# Patient Record
Sex: FEMALE | Race: WHITE
[De-identification: names, ages, dates, MRNs, and addresses within clinical notes are randomized per-mention and may not be internally consistent; named-entity substitution may affect disease eponyms.]

---

## 2019-02-13 ENCOUNTER — HOSPITAL ENCOUNTER (EMERGENCY)
Dept: HOSPITAL 11 - JP.ED | Age: 64
Discharge: HOME | End: 2019-02-13
Payer: COMMERCIAL

## 2019-02-13 VITALS — SYSTOLIC BLOOD PRESSURE: 175 MMHG | DIASTOLIC BLOOD PRESSURE: 101 MMHG

## 2019-02-13 DIAGNOSIS — Z79.899: ICD-10-CM

## 2019-02-13 DIAGNOSIS — Z79.82: ICD-10-CM

## 2019-02-13 DIAGNOSIS — I25.10: ICD-10-CM

## 2019-02-13 DIAGNOSIS — J44.9: ICD-10-CM

## 2019-02-13 DIAGNOSIS — Z91.048: ICD-10-CM

## 2019-02-13 DIAGNOSIS — I10: ICD-10-CM

## 2019-02-13 DIAGNOSIS — J01.21: Primary | ICD-10-CM

## 2019-02-13 NOTE — EDM.PDOC
ED HPI GENERAL MEDICAL PROBLEM





- General


Chief Complaint: ENT Problem


Stated Complaint: HEAD PAIN, PRESSURE


Time Seen by Provider: 02/13/19 01:50


Source of Information: Reports: Patient


History Limitations: Reports: No Limitations





- History of Present Illness


INITIAL COMMENTS - FREE TEXT/NARRATIVE: 





63-year-old female who was had an upper respiratory cold for the last several 

days, has developed intense pressure and pain to the ethmoid sinuses. She has a 

history of recurring sinus infections. She has no fever or chills, no 

significant cough. Pain radiates into the ears.


Onset: Gradual


Duration: Day(s): (3 days)


Associated Symptoms: Reports: Headaches, Malaise.  Denies: Cough, Fever/Chills, 

Shortness of Breath





- Related Data


 Allergies











Allergy/AdvReac Type Severity Reaction Status Date / Time


 


nickel Allergy  Rash Verified 12/04/18 17:13











Home Meds: 


 Home Meds





Aspirin [Adult Low Dose Aspirin EC] 81 mg PO DAILY 09/10/13 [History]


Ca Carbonate/Vitamin D3/Vit K [Calcium + D Soft Chewable Tab] 1 each PO DAILY 09

/10/13 [History]


Multivitamin [Multi-Vitamin Daily] 1 tab PO DAILY 09/10/13 [History]


Metoprolol Succinate 25 mg PO BID 09/11/13 [History]


Albuterol/Ipratropium [Combivent] 1 puff INH QID 09/01/17 [History]


Aspirin [Halfprin] 1 tab PO DAILY 12/04/18 [History]


Aspirin/Caffeine [Percy Back-Body 500-32.5 mg] 2 tab PO BID PRN 12/04/18 [

History]











Past Medical History


HEENT History: Reports: Hard of Hearing, Impaired Vision


Cardiovascular History: Reports: CAD, Hypertension


Other Cardiovascular History: ablasion 2013


Respiratory History: Reports: COPD, SOB


Gastrointestinal History: Reports: Other (See Below)


Other Gastrointestinal History: colitis


OB/GYN History: Reports: Pregnancy


Musculoskeletal History: Reports: Back Pain, Chronic


Other Musculoskeletal History: LAMINECTOMY 2017 AUGUST


Other Neuro History: AVM [id number]





- Infectious Disease History


Infectious Disease History: Reports: C-Difficile, Chicken Pox, Measles





- Past Surgical History


HEENT Surgical History: Reports: Tonsillectomy, Other (See Below)


Other HEENT Surgeries/Procedures: ear surg


Cardiovascular Surgical History: Reports: None


Respiratory Surgical History: Reports: None


GI Surgical History: Reports: Colonoscopy


Neurological Surgical History: Reports: Laminectomy


Musculoskeletal Surgical History: Reports: None





Social & Family History





- Family History


Family Medical History: Noncontributory





- Tobacco Use


Smoking Status *Q: Never Smoker





- Caffeine Use


Caffeine Use: Reports: Coffee





- Recreational Drug Use


Recreational Drug Use: No





- Living Situation & Occupation


Living situation: Reports: 


Occupation: Retired (lives with , retired, in [place], [place]. has three 

grown children)





ED ROS ENT





- Review of Systems


Review Of Systems: See Below


Constitutional: Denies: Fever, Chills


HEENT: Reports: Ear Pain, Sinus Problem.  Denies: Throat Pain


Respiratory: Denies: Shortness of Breath, Cough


Cardiovascular: Denies: Chest Pain


GI/Abdominal: Reports: Nausea.  Denies: Abdominal Pain, Vomiting


Skin: Reports: No Symptoms


Neurological: Reports: Headache





ED EXAM, ENT





- Physical Exam


Exam: See Below


Exam Limited By: No Limitations


General Appearance: Alert, No Apparent Distress (Looks uncomfortable but not 

distressed)


Ears: Normal TMs


Nose: Other (Nasal passages are completely obstructed with exudate)


Mouth/Throat: Normal Inspection


Head: Sinus Tenderness (Significant sinus percussion tenderness, especially on 

the right)


Respiratory/Chest: No Respiratory Distress, Lungs Clear





Course





- Vital Signs


Last Recorded V/S: 


 Last Vital Signs











Temp  97.2 F   02/13/19 01:57


 


Pulse  105 H  02/13/19 01:57


 


Resp  18   02/13/19 01:57


 


BP  175/101 H  02/13/19 01:57


 


Pulse Ox  94 L  02/13/19 01:57














- Re-Assessments/Exams


Free Text/Narrative Re-Assessment/Exam: 





02/13/19 02:15


Patient will be placed on amoxicillin 500 mg 3 times a day for 10 full days, 

and encouraged to use some Afrin or Niall-Synephrine to open his airways 

temporarily. She can continue with decongestants. Recheck in 2-3 days if not 

improving satisfactorily.





Departure





- Departure


Time of Disposition: 02:25


Disposition: Home, Self-Care 01


Condition: Good


Clinical Impression: 


Sinusitis


Qualifiers:


 Sinusitis location: ethmoidal Chronicity: acute Recurrence: recurrent 

Qualified Code(s): J01.21 - Acute recurrent ethmoidal sinusitis








- Discharge Information


Instructions:  Sinusitis, Adult, Easy-to-Read


Referrals: 


aCmeron Gallegos MD [Primary Care Provider] - 


Forms:  ED Department Discharge


Care Plan Goals: 


Take antibiotic 3 times a day until gone. Consider rechecking in 2-3 days if 

not improving satisfactorily. Continue with decongestants, and consider nasal 

spray such as Afrin or Niall-Synephrine to open the airways temporarily.

## 2019-08-26 NOTE — CR
Pelvis 1V or 2V

 

CLINICAL HISTORY: Left hip arthroplasty

 

FINDINGS: Patient is status post total left hip arthroplasty. Components appear

well seated. The upper acetabular groove extends just beyond the ilial cortex.

There is moderate degenerative change in the right hip

 

IMPRESSION: Status post total left hip arthroplasty

 

Moderate to osteoarthritis in the right hip

## 2019-08-26 NOTE — OR
DATE OF PROCEDURE:  08/26/2019

 

PREOPERATIVE DIAGNOSIS:  Osteoarthritis, left hip.

 

POSTOPERATIVE DIAGNOSIS:  Osteoarthritis, left hip.

 

PROCEDURE:  Left total hip arthroplasty using Da Trabecular Metal cup size 
52, M/L Taper

stem size 12.5, 0 neck length 36 mm ceramic head.

 

ANESTHESIA:  Spinal with sedation.

 

INDICATIONS:  Karla is a 64-year-old female with a history of progressive left 
hip pain for

the past several years.  She has been experiencing increasing pain and limited 
range of

motion, particularly over the past 6 months.  X-rays reveal end-stage 
osteoarthritis with

complete joint space collapse, deformity of the femoral head, and significant 
peripheral

osteophytes.  The patient now presents for left total hip arthroplasty.  She 
does have a

significant congenital limb length discrepancy.  This will not be able to be 
corrected

significantly with the total hip procedure.

 

DESCRIPTION OF PROCEDURE:  After adequate anesthesia was obtained, the patient 
was placed in

a lateral decubitus position and secured with a hip positioner.  The left hip 
and leg were

then prepped and draped in a sterile fashion.  A longitudinal incision was made 
over the

greater trochanter and carried down to the subcutaneous tissues.  Hemostasis 
was obtained

with electrocautery.  The IT band was then split in line with its fibers and a 
Charnley

retractor was placed.  Minimal internal rotation of the hip was present.  The 
short

external rotators were taken off the greater trochanter.  Again, hemostasis was 
obtained

with electrocautery.  Capsule was then entered in a T-fashion, allowing the hip 
to be

dislocated.  Significant deformity and wear of the femoral head were noted.  
Retractor was

placed about the neck of the femur, and the femoral neck was then resected with 
an

oscillating saw.  Retractors were replaced with the anterior and inferior 
acetabulum.  Soft

tissues were cleared from the central portion of the acetabulum and any 
remaining labrum was

excised sharply.  Acetabulum was then sequentially reamed to 52 mm.  This was 
irrigated, and

a 52 mm Trabecular Metal cup was press-fit into position.  Additional fixation 
was obtained

with a single acetabular screw.  The polyethylene liner for a 36 mm head was 
then tapped

into position.  Marginal osteophytes were removed with an osteotome.

 

Attention was then returned to the femur.  A box osteotome was used to remove 
the lateral

femoral neck cortex.  Metaphyseal reamer used to lateralize the canal.  The 
canal was then

sequentially broached to 12.5 M/L taper stem.  The stem was tapped into 
position with

excellent fit and fill.  A 0 neck length femoral head trial was placed.  This 
allowed full

extension, although did exhibit some tightness in the capsule.  The hip could 
be flexed to

90 degrees, adduct to 20 degrees and internally rotated slightly beyond 40 
degrees before

any motion of the head was noted.  Trial was removed.  The Sosa taper was 
cleaned and a

final ceramic head was tapped into position.  Hip was reduced once again.  Hip 
was irrigated

with pulse lavage.  It was then irrigated with a dilute Betadine solution which 
was allowed

to sit for approximately 2 minutes and then irrigated once again.

 

IT band was closed with number 2 Ethibond in a running locking fashion.  Skin 
was closed

with 2-0 Vicryl and a running 3-0 Monocryl.  Steri-Strips were applied.  
Sterile dressing

was then placed.  The patient tolerated the procedure very well.  There were no

complications.  She was taken from the operating room in a stable condition.

 

 

 

 

Jorge Cruz MD

DD:  08/26/2019 16:41:28

DT:  08/26/2019 23:00:10

Job #:  [id number]

[email or web address]

## 2019-08-28 NOTE — PCM.DCSUM1
**Discharge Summary





- Hospital Course


Free Text/Narrative:: 





64 year old with severe left hip OA admitted for left ANA.


Diagnosis: Stroke: No





- Discharge Data


Discharge Date: 08/28/19


Discharge Disposition: Home, W Home Health Agency 06


Condition: Good





- Discharge Diagnosis/Problem(s)


(1) Status post total hip replacement, left


SNOMED Code(s): 296065590911, 529391061114


   ICD Code: Z96.642 - PRESENCE OF LEFT ARTIFICIAL HIP JOINT   Status: Acute   

Current Visit: Yes   





(2) Osteoarthritis, hip, bilateral


SNOMED Code(s): 447179521940282


   ICD Code: M16.0 - BILATERAL PRIMARY OSTEOARTHRITIS OF HIP   Status: Chronic 

  Current Visit: No   


Qualifiers: 


   Osteoarthritis type: primary   Qualified Code(s): M16.0 - Bilateral primary 

osteoarthritis of hip   





- Patient Summary/Data


Operative Procedure(s) Performed: Left ANA


Complications: None


Consults: 


 Consultations





08/26/19 09:18


Consult to Case Management/ [CONS] Routine 


   Comment: 


   Physician Instructions: 


   Service(s) to be Consulted: Case Management


   Reason for Consult: Plan for Discharge


PT Evaluation and Treatment [CONS] Routine 


   Please Evaluate and Treat.


   PT Reason for Consult: Post op Ortho Surgery


   Pending Discharge: Yes


   Discharge Disposition: Home w Home Health


   This query below is only for informational purposes and is not editable.





08/28/19 08:27


Consult to Occupational Therapy [OT Evaluation and Treatment] [CONS] Routine 


   Please Evaluate and Treat.


   OT Reason for Consult: ADL's


   Pending Discharge: Yes


   Discharge Disposition: Home w Home Health


   Special Instructions: ADLs and adaptive devices


   This query below is only for informational purposes and is not editable.


   Admission Diagnosis/Problem: Osteoarthritis











Hospital Course: 





Patient admitted and underwent left ANA without complication.  Tolerated 

procedure well and did very well post-operatively.  Pain was controlled, had 

some mild nausea POD #1.  Merino removed POD #1 and had no difficulty with 

voiding.  Did well with PT and is able to get out of bed independently.   

Dressing removed POD # 2 and incision is clean and dry with no drainage.  

Arrangements made for Home Health to follow.  





- Patient Instructions


Diet: Usual Diet as Tolerated


Activity: Full Weight Bearing


Activity, Other: total hip precautions


Driving: Do Not Drive


Showering/Bathing: May Shower


Wound/Incision Care: Keep Operative Site/Wound Site Clean and Dry


Notify Provider of: Fever, Increased Pain, Swelling and Redness, Drainage, 

Nausea and/or Vomiting





- Discharge Plan


*PRESCRIPTION DRUG MONITORING PROGRAM REVIEWED*: No


*COPY OF PRESCRIPTION DRUG MONITORING REPORT IN PATIENT CIRILO: No


Prescriptions/Med Rec: 


oxyCODONE HCl/Acetaminophen [Percocet 5-325 mg Tablet] 1 each PO Q4HR PRN #36 

tablet


 PRN Reason: Pain


Enoxaparin Sodium [Lovenox] 30 mg SQ DAILY 24 Days #24 ml


Home Medications: 


 Home Meds





Aspirin [Adult Low Dose Aspirin EC] 81 mg PO DAILY 09/10/13 [History]


Ca Carbonate/Vitamin D3/Vit K [Calcium + D Soft Chewable Tab] 1 each PO DAILY 09

/10/13 [History]


Multivitamin [Multi-Vitamin Daily] 1 tab PO MOWEFR@0900 09/10/13 [History]


Metoprolol Succinate 25 mg PO BID 09/11/13 [History]


Albuterol/Ipratropium [Combivent] 1 puff INH QID 09/01/17 [History]


Aspirin/Caffeine [Percy Back-Body 500-32.5 mg] 2 tab PO BID PRN 12/04/18 [

History]


Albuterol [Proventil Neb Soln] 2.5 mg INH Q4H PRN 08/26/19 [History]


Fluticasone/Vilanterol [Breo Ellipta 100-25 MCG Inhalation Kit] 1 each IH DAILY 

08/26/19 [History]


Enoxaparin Sodium [Lovenox] 30 mg SQ DAILY 24 Days #24 ml 08/28/19 [Rx]


oxyCODONE HCl/Acetaminophen [Percocet 5-325 mg Tablet] 1 each PO Q4HR PRN #36 

tablet 08/28/19 [Rx]








Other Amb Orders: 


PT Evaluation and Treatment [CONS] Time Frame: 3 Days, Location: None Selected


Oxygen Therapy Mode: Room Air


Referrals: 


Jorge Cruz MD [Physician] - 09/10/19 10:45 am (Please arrive 15 minutes 

early to register for your appointment.  Please register at the ER desk.)





- Discharge Summary/Plan Comment


DC Time >30 min.: Yes (36 min)





- General Info


Functional Status: Reports: Pain Controlled, Tolerating Diet, Ambulating, 

Urinating





- Review of Systems


General: Reports: No Symptoms


Pulmonary: Reports: No Symptoms


Cardiovascular: Reports: No Symptoms


Gastrointestinal: Reports: No Symptoms


Genitourinary: Reports: No Symptoms


Musculoskeletal: Reports: Leg Pain


Skin: Reports: No Symptoms


Neurological: Reports: No Symptoms


Psychiatric: Reports: No Symptoms





- Patient Data


Vitals - Most Recent: 


 Last Vital Signs











Temp  35.6 C   08/28/19 11:54


 


Pulse  96   08/28/19 11:54


 


Resp  16   08/28/19 11:54


 


BP  111/69   08/28/19 11:54


 


Pulse Ox  96   08/28/19 11:54











Weight - Most Recent: 71.894 kg


I&O - Last 24 hours: 


 Intake & Output











 08/27/19 08/28/19 08/28/19





 22:59 06:59 14:59


 


Intake Total 100  


 


Output Total 


 


Balance -250 -650 -1000











Med Orders - Current: 


 Current Medications





Hydrocodone Bitart/Acetaminophen (Norco 325-5 Mg)  1 tab PO Q3H PRN


   PRN Reason: Pain (mild 1-3)


   Last Admin: 08/26/19 10:42 Dose:  1 tab


Albuterol (Proventil Neb Soln)  2.5 mg INH Q4H PRN


   PRN Reason: Shortness of Breath


Albuterol/Ipratropium (Duoneb 3.0-0.5 Mg/3 Ml)  3 ml INH QIDRT [facility]


   Last Admin: 08/28/19 10:57 Dose:  3 ml


Bandage/Support Products ( Nasal )  1 applic NASBOTH BID [facility]


   Stop: 09/01/19 21:01


   Last Admin: 08/28/19 09:49 Dose:  1 applic


Docusate Sodium (Colace)  100 mg PO BID PRN


   PRN Reason: Constipation


Enoxaparin Sodium (Lovenox)  30 mg SUBCUT DAILY [facility]


   Last Admin: 08/28/19 09:49 Dose:  30 mg


Magnesium Hydroxide (Milk Of Magnesia)  30 ml PO BID PRN


   PRN Reason: Constipation


Metoprolol Succinate (Toprol Xl)  25 mg PO BID [facility]


   Last Admin: 08/28/19 09:50 Dose:  25 mg


Morphine Sulfate (Morphine)  2 mg IVPUSH Q1H PRN


   PRN Reason: Breakthrough Pain


Ondansetron HCl (Zofran)  4 mg IVPUSH Q6H PRN


   PRN Reason: Nausea/Vomiting


   Last Admin: 08/28/19 07:27 Dose:  4 mg


Oxycodone/Acetaminophen (Percocet 325-5 Mg)  2 tab PO Q4H PRN


   PRN Reason: Pain


   Last Admin: 08/28/19 10:05 Dose:  2 tab


Fluticasone/Salmeterol (Fluticasone-Salmeterol 113-14 Mcg Powder Inh)  1 puff 

INH BIDRT [facility]


   Last Admin: 08/28/19 07:30 Dose:  1 puff





Discontinued Medications





Enoxaparin Sodium (Lovenox)  30 mg SUBCUT DAILY [facility]


Fentanyl (Sublimaze) Confirm Administered Dose 100 mcg .ROUTE .STK-MED ONE


   Stop: 08/26/19 07:27


Cefazolin Sodium/Dextrose 2 gm (/ Premix)  50 mls @ 100 mls/hr IV ONETIME ONE


   Stop: 08/26/19 07:59


   Last Admin: 08/26/19 07:41 Dose:  100 mls/hr


Lactated Ringer's (Ringers, Lactated)  1,000 mls @ 75 mls/hr IV ASDIRECTED [facility]


   Last Admin: 08/26/19 06:25 Dose:  75 mls/hr


Lactated Ringer's (Ringers, Lactated) Confirm Administered Dose 1,000 mls @ as 

directed .ROUTE .STK-MED ONE


   Stop: 08/26/19 08:23


Cefazolin Sodium 1 gm/ Sodium (Chloride)  50 mls @ 200 mls/hr IV Q8H [facility]


   Stop: 08/27/19 01:44


   Last Admin: 08/26/19 11:34 Dose:  Not Given


Sodium Chloride (Normal Saline)  1,000 mls @ 125 mls/hr IV ASDIRECTED [facility]


Sodium Chloride (Normal Saline)  1,000 mls @ 125 mls/hr IV ASDIRECTED [facility]


   Last Admin: 08/26/19 23:39 Dose:  125 mls/hr


Cefazolin Sodium/Dextrose 1 gm (/ Premix)  50 mls @ 100 mls/hr IV Q8H [facility]


   Stop: 08/27/19 06:59


   Last Admin: 08/27/19 05:30 Dose:  100 mls/hr


Metoprolol Succinate (Toprol Xl)  25 mg PO BID [facility]


Midazolam HCl (Versed 1 Mg/Ml) Confirm Administered Dose 2 mg .ROUTE .STK-MED 

ONE


   Stop: 08/26/19 07:27


Midazolam HCl (Versed 1 Mg/Ml) Confirm Administered Dose 2 mg .ROUTE .STK-MED 

ONE


   Stop: 08/26/19 08:21


Non-Formulary Medication (Albuterol/Ipratropium [Combivent])  1 puff INH QID [facility]


   Last Admin: 08/26/19 10:56 Dose:  Not Given


Non-Formulary Medication (Fluticasone/Vilanterol)  1 each IH DAILY [facility]


Povidone Iodine (Betadine 10% Soln) Confirm Administered Dose 1 ml .ROUTE .STK-

MED ONE


   Stop: 08/26/19 06:38


   Last Admin: 08/26/19 10:03 Dose:  1 ml


Propofol (Diprivan  20 Ml) Confirm Administered Dose 200 mg .ROUTE .STK-MED ONE


   Stop: 08/26/19 07:26











- Exam


General: Reports: Alert, Oriented


HEENT: Reports: Pupils Equal, Pupils Reactive, EOMI, Mucous Membr. Moist/Pink


Neck: Reports: Supple


Lungs: Reports: Clear to Auscultation, Normal Respiratory Effort


Cardiovascular: Reports: Regular Rate, Regular Rhythm


GI/Abdominal Exam: Normal Bowel Sounds, Soft, Non-Tender, No Organomegaly, No 

Distention, No Abnormal Bruit, No Mass, Pelvis Stable


 (Female) Exam: Deferred


Rectal (Female) Exam: Deferred


Back Exam: Reports: Normal Inspection, Full Range of Motion


Extremities: Other (Incision dry, moderate bruising and swelling )


Skin: Reports: Warm, Dry


Wound/Incisions: Reports: Healing Well, Dressing Dry and Intact, No Drainage


Neurological: Reports: No New Focal Deficit


Psy/Mental Status: Reports: Alert, Normal Affect, Normal Mood

## 2019-08-28 NOTE — PCM.SURGPN
- General Info


Date of Service: 08/27/19


Date of Surgery/Procedure: 08/26/19


POD#: 1


Post-Op Diagnosis: S/P left ANA


Functional Status: Reports: Pain Controlled, Tolerating Diet, Ambulating





- Review of Systems


General: Reports: No Symptoms


HEENT: Reports: No Symptoms


Pulmonary: Reports: No Symptoms


Cardiovascular: Reports: No Symptoms


Gastrointestinal: Reports: No Symptoms


Genitourinary: Reports: No Symptoms


Skin: Reports: No Symptoms


Neurological: Reports: No Symptoms


Psychiatric: Reports: No Symptoms





- Patient Data


Vitals - Most Recent: 


 Last Vital Signs











Temp  35.6 C   08/28/19 11:54


 


Pulse  96   08/28/19 11:54


 


Resp  16   08/28/19 11:54


 


BP  111/69   08/28/19 11:54


 


Pulse Ox  96   08/28/19 11:54











Weight - Most Recent: 71.894 kg


I&O - Last 24 Hours: 


 Intake & Output











 08/27/19 08/28/19 08/28/19





 22:59 06:59 14:59


 


Intake Total 100  


 


Output Total 


 


Balance -250 -650 -1000











Med Orders - Current: 


 Current Medications





Hydrocodone Bitart/Acetaminophen (Norco 325-5 Mg)  1 tab PO Q3H PRN


   PRN Reason: Pain (mild 1-3)


   Last Admin: 08/26/19 10:42 Dose:  1 tab


Albuterol (Proventil Neb Soln)  2.5 mg INH Q4H PRN


   PRN Reason: Shortness of Breath


Albuterol/Ipratropium (Duoneb 3.0-0.5 Mg/3 Ml)  3 ml INH QIDRT [facility]


   Last Admin: 08/28/19 10:57 Dose:  3 ml


Bandage/Support Products ( Nasal )  1 applic NASBOTH BID [facility]


   Stop: 09/01/19 21:01


   Last Admin: 08/28/19 09:49 Dose:  1 applic


Docusate Sodium (Colace)  100 mg PO BID PRN


   PRN Reason: Constipation


Enoxaparin Sodium (Lovenox)  30 mg SUBCUT DAILY [facility]


   Last Admin: 08/28/19 09:49 Dose:  30 mg


Magnesium Hydroxide (Milk Of Magnesia)  30 ml PO BID PRN


   PRN Reason: Constipation


Metoprolol Succinate (Toprol Xl)  25 mg PO BID [facility]


   Last Admin: 08/28/19 09:50 Dose:  25 mg


Morphine Sulfate (Morphine)  2 mg IVPUSH Q1H PRN


   PRN Reason: Breakthrough Pain


Ondansetron HCl (Zofran)  4 mg IVPUSH Q6H PRN


   PRN Reason: Nausea/Vomiting


   Last Admin: 08/28/19 07:27 Dose:  4 mg


Oxycodone/Acetaminophen (Percocet 325-5 Mg)  2 tab PO Q4H PRN


   PRN Reason: Pain


   Last Admin: 08/28/19 10:05 Dose:  2 tab


Fluticasone/Salmeterol (Fluticasone-Salmeterol 113-14 Mcg Powder Inh)  1 puff 

INH BIDRT [facility]


   Last Admin: 08/28/19 07:30 Dose:  1 puff





Discontinued Medications





Enoxaparin Sodium (Lovenox)  30 mg SUBCUT DAILY [facility]


Fentanyl (Sublimaze) Confirm Administered Dose 100 mcg .ROUTE .STK-MED ONE


   Stop: 08/26/19 07:27


Cefazolin Sodium/Dextrose 2 gm (/ Premix)  50 mls @ 100 mls/hr IV ONETIME ONE


   Stop: 08/26/19 07:59


   Last Admin: 08/26/19 07:41 Dose:  100 mls/hr


Lactated Ringer's (Ringers, Lactated)  1,000 mls @ 75 mls/hr IV ASDIRECTED [facility]


   Last Admin: 08/26/19 06:25 Dose:  75 mls/hr


Lactated Ringer's (Ringers, Lactated) Confirm Administered Dose 1,000 mls @ as 

directed .ROUTE .STK-MED ONE


   Stop: 08/26/19 08:23


Cefazolin Sodium 1 gm/ Sodium (Chloride)  50 mls @ 200 mls/hr IV Q8H [facility]


   Stop: 08/27/19 01:44


   Last Admin: 08/26/19 11:34 Dose:  Not Given


Sodium Chloride (Normal Saline)  1,000 mls @ 125 mls/hr IV ASDIRECTED [facility]


Sodium Chloride (Normal Saline)  1,000 mls @ 125 mls/hr IV ASDIRECTED [facility]


   Last Admin: 08/26/19 23:39 Dose:  125 mls/hr


Cefazolin Sodium/Dextrose 1 gm (/ Premix)  50 mls @ 100 mls/hr IV Q8H [facility]


   Stop: 08/27/19 06:59


   Last Admin: 08/27/19 05:30 Dose:  100 mls/hr


Metoprolol Succinate (Toprol Xl)  25 mg PO BID [facility]


Midazolam HCl (Versed 1 Mg/Ml) Confirm Administered Dose 2 mg .ROUTE .STK-MED 

ONE


   Stop: 08/26/19 07:27


Midazolam HCl (Versed 1 Mg/Ml) Confirm Administered Dose 2 mg .ROUTE .STK-MED 

ONE


   Stop: 08/26/19 08:21


Non-Formulary Medication (Albuterol/Ipratropium [Combivent])  1 puff INH QID [facility]


   Last Admin: 08/26/19 10:56 Dose:  Not Given


Non-Formulary Medication (Fluticasone/Vilanterol)  1 each IH DAILY [facility]


Povidone Iodine (Betadine 10% Soln) Confirm Administered Dose 1 ml .ROUTE .STK-

MED ONE


   Stop: 08/26/19 06:38


   Last Admin: 08/26/19 10:03 Dose:  1 ml


Propofol (Diprivan  20 Ml) Confirm Administered Dose 200 mg .ROUTE .STK-MED ONE


   Stop: 08/26/19 07:26











- Exam


Wound/Incisions: Dressing Dry and Intact


General: Alert, Oriented


HEENT: Pupils Equal


Neck: Supple


Lungs: Clear to Auscultation, Normal Respiratory Effort


Cardiovascular: Regular Rate, Regular Rhythm


GI/Abdominal Exam: Normal Bowel Sounds, Soft, Non-Tender, No Organomegaly, No 

Distention, No Abnormal Bruit, No Mass, Pelvis Stable


Extremities: Leg Pain


Skin: Warm, Dry


Neurological: No New Focal Deficit


Psy/Mental Status: Alert, Normal Affect, Normal Mood





- Problem List & Annotations


(1) Status post total hip replacement, left


SNOMED Code(s): 390115812340, 979588034002


   Code(s): Z96.642 - PRESENCE OF LEFT ARTIFICIAL HIP JOINT   Status: Acute   

Current Visit: Yes   





(2) Osteoarthritis, hip, bilateral


SNOMED Code(s): 790562883645992


   Code(s): M16.0 - BILATERAL PRIMARY OSTEOARTHRITIS OF HIP   Status: Chronic   

Current Visit: No   


Qualifiers: 


   Osteoarthritis type: primary   Qualified Code(s): M16.0 - Bilateral primary 

osteoarthritis of hip   





- Problem List Review


Problem List Initiated/Reviewed/Updated: Yes





- My Orders


Last 24 Hours: 


 Active Orders 24 hr











 Category Date Time Status


 


 Ready for Discharge [RC] PER UNIT ROUTINE Care  08/28/19 13:09 Ordered


 


 Consult to Occupational Therapy [OT Evaluation and Cons  08/28/19 08:27 Active





 Treatment] [CONS] Routine   








 Medication Orders





Hydrocodone Bitart/Acetaminophen (Norco 325-5 Mg)  1 tab PO Q3H PRN


   PRN Reason: Pain (mild 1-3)


   Last Admin: 08/26/19 10:42  Dose: 1 tab


Albuterol (Proventil Neb Soln)  2.5 mg INH Q4H PRN


   PRN Reason: Shortness of Breath


Albuterol/Ipratropium (Duoneb 3.0-0.5 Mg/3 Ml)  3 ml INH QIDRT [facility]


   Last Admin: 08/28/19 10:57  Dose: 3 ml


   Admin: 08/28/19 07:24  Dose: 3 ml


   Admin: 08/27/19 20:16  Dose: 3 ml


   Admin: 08/27/19 14:47  Dose: 3 ml


   Admin: 08/27/19 11:05  Dose: 3 ml


   Admin: 08/27/19 07:28  Dose: 3 ml


   Admin: 08/26/19 20:02  Dose: 3 ml


   Admin: 08/26/19 15:01  Dose: 3 ml


Bandage/Support Products ( Nasal )  1 applic NASBOTH BID [facility]


   Stop: 09/01/19 21:01


   Last Admin: 08/28/19 09:49  Dose: 1 applic


   Admin: 08/27/19 20:15  Dose: 1 applic


   Admin: 08/27/19 08:10  Dose: 1 applic


   Admin: 08/26/19 20:02  Dose: 1 applic


   Admin: 08/26/19 06:26  Dose: 1 applic


Docusate Sodium (Colace)  100 mg PO BID PRN


   PRN Reason: Constipation


Enoxaparin Sodium (Lovenox)  30 mg SUBCUT DAILY [facility]


   Last Admin: 08/28/19 09:49  Dose: 30 mg


   Admin: 08/27/19 08:09  Dose: 30 mg


Magnesium Hydroxide (Milk Of Magnesia)  30 ml PO BID PRN


   PRN Reason: Constipation


Metoprolol Succinate (Toprol Xl)  25 mg PO BID [facility]


   Last Admin: 08/28/19 09:50  Dose: 25 mg


   Admin: 08/27/19 20:17  Dose: 25 mg


   Admin: 08/27/19 08:10  Dose: 25 mg


   Admin: 08/26/19 20:04  Dose: 25 mg


Morphine Sulfate (Morphine)  2 mg IVPUSH Q1H PRN


   PRN Reason: Breakthrough Pain


Ondansetron HCl (Zofran)  4 mg IVPUSH Q6H PRN


   PRN Reason: Nausea/Vomiting


   Last Admin: 08/28/19 07:27  Dose: 4 mg


   Admin: 08/28/19 00:05  Dose: 4 mg


Oxycodone/Acetaminophen (Percocet 325-5 Mg)  2 tab PO Q4H PRN


   PRN Reason: Pain


   Last Admin: 08/28/19 10:05  Dose: 2 tab


   Admin: 08/28/19 02:40  Dose: 2 tab


   Admin: 08/27/19 19:34  Dose: 2 tab


   Admin: 08/27/19 14:31  Dose: 2 tab


   Admin: 08/27/19 09:39  Dose: 2 tab


   Admin: 08/27/19 03:04  Dose: 2 tab


   Admin: 08/26/19 22:31  Dose: 2 tab


   Admin: 08/26/19 18:13  Dose: 2 tab


   Admin: 08/26/19 13:13  Dose: 2 tab


Fluticasone/Salmeterol (Fluticasone-Salmeterol 113-14 Mcg Powder Inh)  1 puff 

INH BIDRT [facility]


   Last Admin: 08/28/19 07:30  Dose: 1 puff


   Admin: 08/27/19 20:16  Dose: 1 puff


   Admin: 08/27/19 08:10  Dose: 1 puff











- Assessment


Assessment           (Free Text/Narrative):: 





Did very well overnight, has been up with walker already, no complaints





- Plan


Plan                        (Free Text/Narrative):: 





D/C [facility], saline lock IV, up with PT/OT, start arrangements for discharge with 

Home Health to follow.

## 2019-09-12 NOTE — CRLCT
INDICATION:



Shortness of breath



COMPARISON:



Single view chest 09/12/2019



TECHNIQUE:



Contrast enhanced axial CT imaging through the chest, optimized for 

assessment of the pulmonary arterial tree. 100 cc Isovue 370 contrast agent 

was administered intravenously. Sagittal and coronal reconstructions are 

provided. 



FINDINGS:



There is adequate opacification of the pulmonary arterial tree, without 

evidence of thromboembolism.There is normal caliber of the main pulmonary 

artery.



The heart is nonenlarged. There is no pericardial effusion. Coronary artery 

disease is noted. There is normal caliber of the thoracic aorta. There is 

no mediastinal lymphadenopathy.



Pulmonary emphysema is noted. There is mild bibasilar scarring. There is no 

pleural effusion or pneumothorax.



Multilevel degenerative changes are demonstrated in the thoracic spine. 

There is compression deformity of the T7 vertebral body with up to 50% 

height loss.



No significant abnormality is demonstrated in the visualized upper abdomen.



IMPRESSION:



1. No evidence of pulmonary thromboembolism.



2. Pulmonary emphysema with mild bibasilar scarring. 



3. Coronary artery disease. 



4. Moderate compression deformity of the T7 vertebral body.



Please note that all CT scans at this facility use dose modulation, 

iterative reconstruction, and/or weight-based dosing when appropriate to 

reduce radiation dose to as low as reasonably achievable.



Dictated by Esteban Sparks MD @ Sep 12 2019  9:20PM



Signed by Dr. Esteban Sparks @ Sep 12 2019  9:20PM

## 2019-09-12 NOTE — EDM.PDOC
ED HPI GENERAL MEDICAL PROBLEM





- General


Chief Complaint: Respiratory Problem


Stated Complaint: SOB


Time Seen by Provider: 09/12/19 19:52


Source of Information: Reports: Patient


History Limitations: Reports: No Limitations





- History of Present Illness


INITIAL COMMENTS - FREE TEXT/NARRATIVE: 





pt had an episode that came on very suddenly during the nite where she was sob. 

She has a history of copd but she usually gets sob when she is active. 


Onset: Sudden, Other ( started in the middle of the nite. )


Duration: Hour(s):


Location: Reports: Chest


Associated Symptoms: Reports: Chest Pain, Cough, Other ( chest feels tight and 

strange to her.  Pt is on lovenox. )





- Related Data


 Allergies











Allergy/AdvReac Type Severity Reaction Status Date / Time


 


nickel Allergy  Rash Verified 09/01/19 22:26











Home Meds: 


 Home Meds





Aspirin [Adult Low Dose Aspirin EC] 81 mg PO DAILY 09/10/13 [History]


Ca Carbonate/Vitamin D3/Vit K [Calcium + D Soft Chewable Tab] 1 each PO DAILY 09

/10/13 [History]


Multivitamin [Multi-Vitamin Daily] 1 tab PO MOWEFR@0900 09/10/13 [History]


Metoprolol Succinate 25 mg PO BID 09/11/13 [History]


Albuterol/Ipratropium [Combivent] 1 puff INH QID 09/01/17 [History]


Aspirin/Caffeine [Percy Back-Body 500-32.5 mg] 2 tab PO BID PRN 12/04/18 [

History]


Albuterol [Proventil Neb Soln] 2.5 mg INH Q4H PRN 08/26/19 [History]


Fluticasone/Vilanterol [Breo Ellipta 100-25 MCG Inhalation Kit] 1 each IH DAILY 

08/26/19 [History]


Enoxaparin Sodium [Lovenox] 30 mg SQ DAILY 24 Days #24 ml 08/28/19 [Rx]


Ondansetron HCl [Zofran] 4 mg PO Q6HR PRN #12 tablet 08/28/19 [Rx]


Furosemide 20 mg PO DAILY 09/11/19 [History]











Past Medical History


HEENT History: Reports: Hard of Hearing, Impaired Vision


Cardiovascular History: Reports: CAD, Hypertension


Other Cardiovascular History: ablasion 2013


Respiratory History: Reports: COPD, SOB


Gastrointestinal History: Reports: Other (See Below)


Other Gastrointestinal History: colitis


Genitourinary History: Reports: None


OB/GYN History: Reports: Pregnancy


Musculoskeletal History: Reports: Back Pain, Chronic


Other Musculoskeletal History: LAMINECTOMY 2017 AUGUST.  L lower back pain 

radiating to L hip down to L knee


Other Neuro History: AVM [id number]


Psychiatric History: Reports: None


Endocrine/Metabolic History: Reports: None


Hematologic History: Reports: None


Immunologic History: Reports: None


Oncologic (Cancer) History: Reports: None


Dermatologic History: Reports: None





- Infectious Disease History


Infectious Disease History: Reports: Chicken Pox, Measles





- Past Surgical History


HEENT Surgical History: Reports: Tonsillectomy, Other (See Below)


Other HEENT Surgeries/Procedures: ear surg


Cardiovascular Surgical History: Reports: None


Respiratory Surgical History: Reports: None


GI Surgical History: Reports: Colonoscopy


Neurological Surgical History: Reports: Laminectomy


Musculoskeletal Surgical History: Reports: None, Hip Replacement





Social & Family History





- Family History


Family Medical History: Noncontributory





- Tobacco Use


Smoking Status *Q: Current Every Day Smoker


Years of Tobacco use: 50


Packs/Tins Daily: 0.5





- Caffeine Use


Caffeine Use: Reports: Coffee


Caffeine Use Comment: Daily cup of coffee





- Alcohol Use


Days Per Week of Alcohol Use: 7


Number of Drinks Per Day: 2


Total Drinks Per Week: 14





- Recreational Drug Use


Recreational Drug Use: No





- Living Situation & Occupation


Living situation: Reports: 


Occupation: Retired (lives with , retired, in [place], [place]. has three 

grown children)





ED ROS GENERAL





- Review of Systems


Review Of Systems: See Below


Constitutional: Reports: No Symptoms


HEENT: Reports: No Symptoms


Respiratory: Reports: Shortness of Breath, Other (pt had a recent total hip 

done on the left. She had an episode of marked sob during the nite lasr nite. )


Cardiovascular: Reports: Dyspnea on Exertion, Other (pt has a known history of 

copd. )


Endocrine: Reports: No Symptoms


GI/Abdominal: Reports: No Symptoms


: Reports: No Symptoms


Musculoskeletal: Reports: No Symptoms


Skin: Reports: No Symptoms


Neurological: Reports: No Symptoms


Psychiatric: Reports: No Symptoms





ED EXAM, GENERAL





- Physical Exam


Exam: See Below


Free Text/Narrative:: 





pt had an episode of marked sob during the nite last nite. She does have a 

known histoy of copd. She is mildly nauseated. She was concerned about the Sob 

because of her recent hip surgery. 


Exam Limited By: No Limitations


General Appearance: Alert, Anxious, Moderate Distress


Ears: Normal TMs


Nose: Normal Inspection


Throat/Mouth: Normal Inspection


Head: Atraumatic


Neck: Normal Inspection


Respiratory/Chest: Other (pt does drop her o2 sats with ambulation , At rest 

her sats are good . She dioes not have wheezing or congestiopn She does have a 

neb at home. )


Cardiovascular: Regular Rate, Rhythm


GI/Abdominal: Soft, Non-Tender


 (Female) Exam: Deferred


Rectal (Female) Exam: Deferred


Back Exam: Normal Inspection


Extremities: Normal Inspection


Neurological: Alert, Oriented, Normal Cognition





Course





- Vital Signs


Last Recorded V/S: 


 Last Vital Signs











Temp  36.3 C   09/12/19 19:11


 


Pulse  87   09/12/19 21:27


 


Resp  18   09/12/19 21:27


 


BP  142/94 H  09/12/19 21:27


 


Pulse Ox  94 L  09/12/19 21:27














- Orders/Labs/Meds


Orders: 


 Active Orders 24 hr











 Category Date Time Status


 


 RT Aerosol Therapy [RC] ASDIRECTED Care  09/12/19 21:40 Active











Labs: 


 Laboratory Tests











  09/12/19 09/12/19 09/12/19 Range/Units





  19:39 19:39 20:11 


 


WBC  8.3    (4.5-11.0)  K/uL


 


RBC  3.65    (3.30-5.50)  M/uL


 


Hgb  11.6 L    (12.0-15.0)  g/dL


 


Hct  37.0    (36.0-48.0)  %


 


MCV  101 H    (80-98)  fL


 


MCH  32 H    (27-31)  pg


 


MCHC  31 L    (32-36)  %


 


Plt Count  709 H    (150-400)  K/uL


 


Neut % (Auto)  42    (36-66)  %


 


Lymph % (Auto)  44    (24-44)  %


 


Mono % (Auto)  11 H    (2-6)  %


 


Eos % (Auto)  2    (2-4)  %


 


Baso % (Auto)  1    (0-1)  %


 


Sodium   142   (140-148)  mmol/L


 


Potassium   4.2   (3.6-5.2)  mmol/L


 


Chloride   106   (100-108)  mmol/L


 


Carbon Dioxide   28   (21-32)  mmol/L


 


Anion Gap   8.1   (5.0-14.0)  mmol/L


 


BUN   15   (7-18)  mg/dL


 


Creatinine   0.7   (0.6-1.0)  mg/dL


 


Est Cr Clr Drug Dosing   73.06   mL/min


 


Estimated GFR (MDRD)   > 60   (>60)  


 


Glucose   96   ()  mg/dL


 


Calcium   9.0   (8.5-10.1)  mg/dL


 


Total Bilirubin   0.2   (0.2-1.0)  mg/dL


 


AST   19   (15-37)  U/L


 


ALT   32  D   (12-78)  U/L


 


Alkaline Phosphatase   145 H   ()  U/L


 


C-Reactive Protein   0.53 H   (0.0-0.3)  mg/dL


 


Total Protein   6.4   (6.4-8.2)  g/dL


 


Albumin   3.3 L   (3.4-5.0)  g/dL


 


Globulin   3.1   (2.3-3.5)  g/dL


 


Albumin/Globulin Ratio   1.1 L   (1.2-2.2)  


 


Urine Color    Yellow  (YELLOW)  


 


Urine Appearance    Slightly cloudy A  (CLEAR)  


 


Urine pH    5.5  (5.0-8.0)  


 


Ur Specific Gravity    >= 1.030  (1.008-1.030)  


 


Urine Protein    Negative  (NEGATIVE)  mg/dL


 


Urine Glucose (UA)    Negative  (NEGATIVE)  mg/dL


 


Urine Ketones    Negative  (NEGATIVE)  mg/dL


 


Urine Occult Blood    Negative  (NEGATIVE)  


 


Urine Nitrite    Negative  (NEGATIVE)  


 


Urine Bilirubin    Small H  (NEGATIVE)  


 


Urine Urobilinogen    1.0  (0.2-1.0)  EU/dL


 


Ur Leukocyte Esterase    Negative  (NEGATIVE)  


 


Urine RBC    0-5  (0-5)  


 


Urine WBC    0-5  (0-5)  


 


Ur Epithelial Cells    Not seen  


 


Amorphous Sediment    Moderate  


 


Urine Bacteria    Not seen  


 


Urine Mucus    Moderate  











Meds: 


Medications














Discontinued Medications














Generic Name Dose Route Start Last Admin





  Trade Name Freq  PRN Reason Stop Dose Admin


 


Albuterol  2.5 mg  09/12/19 21:40  09/12/19 21:51





  Proventil Neb Soln  NEB  09/12/19 21:41  2.5 mg





  ONETIME ONE   Administration





     





     





     





     


 


Sodium Chloride  1,000 mls @ 250 mls/hr  09/12/19 19:45  09/12/19 19:42





  Normal Saline  IV   250 mls/hr





  ASDIRECTED [facility]   Administration





     





     





     





     


 


Sodium Chloride  100 mls @ 0 mls/hr  09/12/19 20:30  09/12/19 21:11





  Normal Saline  IV   4 mls/hr





  ASDIRECTED [facility]   Administration





     





     





     





  KVO   


 


Iopamidol  100 ml  09/12/19 20:30  09/12/19 21:10





  Isovue-370 (76%)  IV   100 ml





  .AS DIRECTED [facility]   Administration





     





     





     





     


 


Ondansetron HCl  4 mg  09/12/19 21:44  09/12/19 21:51





  Zofran  IVPUSH  09/12/19 21:45  4 mg





  ONETIME ONE   Administration





     





     





     





     


 


Sodium Chloride  10 ml  09/12/19 20:24  09/12/19 21:11





  Saline Flush  FLUSH  09/12/19 20:25  10 ml





  ONETIME ONE   Administration





     





     





     





     














- Re-Assessments/Exams


Free Text/Narrative Re-Assessment/Exam: 





09/12/19 21:49


pt had a cat scan of her lung which was neg for infection, PE and fluid. 





Departure





- Departure


Time of Disposition: 21:50


Disposition: Home, Self-Care 01


Condition: Fair


Clinical Impression: 


 SOB (shortness of breath), COPD (chronic obstructive pulmonary disease), 

History of hip surgery








- Discharge Information


Instructions:  Shortness of Breath, Adult


Referrals: 


Cameron Gallegos MD [Primary Care Provider] - 


Forms:  ED Department Discharge


Care Plan Goals: 


cont same meds, Use nebs regularly, use over the counter pepcid twice daily for 

stomach, zoforan 4mg q6h as needed for nausea. 





- My Orders


Last 24 Hours: 


My Active Orders





09/12/19 21:40


RT Aerosol Therapy [RC] ASDIRECTED 














- Assessment/Plan


Last 24 Hours: 


My Active Orders





09/12/19 21:40


RT Aerosol Therapy [RC] ASDIRECTED

## 2020-01-01 NOTE — EDM.PDOC
ED HPI GENERAL MEDICAL PROBLEM





- General


Chief Complaint: Respiratory Problem


Stated Complaint: SOB


Time Seen by Provider: 01/01/20 12:25


Source of Information: Reports: Patient


History Limitations: Reports: No Limitations





- History of Present Illness


INITIAL COMMENTS - FREE TEXT/NARRATIVE: 





pt arrived with increased sob. She was seen at the clinic on tuesday and she 

was placed on predisone and a z pack. During the nite the pt became much more 

sob. She was using the nebs about every 2-3 hours  Pt did not have a fever. 


Onset: Other ( Pt got worse in the middle of the nite. )


Duration: Hour(s):


Location: Reports: Chest


Associated Symptoms: Reports: Cough, Shortness of Breath, Other (pt was very 

wheezy. )





- Related Data


 Allergies











Allergy/AdvReac Type Severity Reaction Status Date / Time


 


nickel Allergy  Rash Verified 01/01/20 12:22











Home Meds: 


 Home Meds





Aspirin [Adult Low Dose Aspirin EC] 81 mg PO DAILY 09/10/13 [History]


Calcium Carb/Vitamin D3/Vit K1 [Calcium + D Soft Chewable Tab] 1 each PO DAILY 

09/10/13 [History]


Multivitamin [Multi-Vitamin Daily] 1 tab PO MOWEFR@0900 09/10/13 [History]


Metoprolol Succinate 50 mg PO BID 09/11/13 [History]


Albuterol/Ipratropium [Combivent] 1 puff INH QID 09/01/17 [History]


Aspirin/Caffeine [Percy Back-Body 500-32.5 mg] 2 tab PO BID PRN 12/04/18 [

History]


Albuterol [Proventil Neb Soln] 2.5 mg INH Q4H PRN 08/26/19 [History]


Fluticasone/Vilanterol [Breo Ellipta 100-25 MCG Inhalation Kit] 1 each IH DAILY 

08/26/19 [History]


Azithromycin [Zithromax] 250 mg PO ASDIRECTED 01/01/20 [History]


predniSONE [Prednisone] 40 mg PO ASDIRECTED 01/01/20 [History]











Past Medical History


HEENT History: Reports: Hard of Hearing, Impaired Vision


Cardiovascular History: Reports: CAD, Hypertension


Other Cardiovascular History: ablasion 2013


Respiratory History: Reports: COPD, SOB


Gastrointestinal History: Reports: Other (See Below)


Other Gastrointestinal History: colitis


Genitourinary History: Reports: None


OB/GYN History: Reports: Pregnancy


Musculoskeletal History: Reports: Back Pain, Chronic


Other Musculoskeletal History: LAMINECTOMY 2017 AUGUST.  L lower back pain 

radiating to L hip down to L knee.  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


Other Neuro History: AVM [id number]


Psychiatric History: Reports: None


Endocrine/Metabolic History: Reports: None


Hematologic History: Reports: None


Immunologic History: Reports: None


Oncologic (Cancer) History: Reports: None


Dermatologic History: Reports: None





- Infectious Disease History


Infectious Disease History: Reports: Chicken Pox, Measles





- Past Surgical History


HEENT Surgical History: Reports: Tonsillectomy, Other (See Below)


Other HEENT Surgeries/Procedures: ear surg


Cardiovascular Surgical History: Reports: None


Respiratory Surgical History: Reports: None


GI Surgical History: Reports: Colonoscopy


Neurological Surgical History: Reports: Laminectomy


Musculoskeletal Surgical History: Reports: None, Hip Replacement





Social & Family History





- Family History


Family Medical History: Noncontributory





- Tobacco Use


Smoking Status *Q: Current Every Day Smoker


Years of Tobacco use: 50


Packs/Tins Daily: 0.3


Used Tobacco, but Quit: No





- Caffeine Use


Caffeine Use: Reports: Coffee


Caffeine Use Comment: Daily cup of coffee





- Alcohol Use


Days Per Week of Alcohol Use: 7


Number of Drinks Per Day: 2


Total Drinks Per Week: 14





- Recreational Drug Use


Recreational Drug Use: No





- Living Situation & Occupation


Living situation: Reports: 


Occupation: Retired (lives with , retired, in [place], [place]. has three 

grown children)





ED ROS GENERAL





- Review of Systems


Review Of Systems: See Below


Constitutional: Reports: Weakness, Fatigue


HEENT: Reports: No Symptoms


Respiratory: Reports: Shortness of Breath, Wheezing, Cough


Cardiovascular: Reports: No Symptoms


Endocrine: Reports: No Symptoms


GI/Abdominal: Reports: No Symptoms


: Reports: No Symptoms


Musculoskeletal: Reports: No Symptoms


Skin: Reports: No Symptoms





ED EXAM, GENERAL





- Physical Exam


Exam: See Below


Free Text/Narrative:: 





pt arrived with increased sob and wheezing. 


Exam Limited By: Uncooperative


General Appearance: Anxious, Moderate Distress


Ears: Normal TMs


Nose: Normal Inspection


Throat/Mouth: Normal Inspection


Head: Atraumatic


Neck: Normal Inspection


Respiratory/Chest: Decreased Breath Sounds, Rales, Wheezing


Cardiovascular: Regular Rate, Rhythm, Tachycardia


GI/Abdominal: Soft, Non-Tender


 (Female) Exam: Deferred


Rectal (Female) Exam: Deferred


Back Exam: Normal Inspection


Extremities: Normal Inspection


Neurological: Alert, Oriented, Normal Cognition





Course





- Vital Signs


Last Recorded V/S: 


 Last Vital Signs











Temp  36.4 C   01/01/20 12:34


 


Pulse  95   01/01/20 15:06


 


Resp  18   01/01/20 15:06


 


BP  147/86 H  01/01/20 15:06


 


Pulse Ox  92 L  01/01/20 15:06














- Orders/Labs/Meds


Orders: 


 Active Orders 24 hr











 Category Date Time Status


 


 RT Aerosol Therapy [RC] ASDIRECTED Care  01/01/20 12:31 Active


 


 RT Aerosol Therapy [RC] ASDIRECTED Care  01/01/20 14:19 Active


 


 Sodium Chloride 0.9% [Saline Flush] Med  01/01/20 12:32 Active





 10 ml FLUSH ASDIRECTED PRN   


 


 Saline Lock Insert [OM.PC] Routine Oth  01/01/20 12:32 Ordered








 Medication Orders





Sodium Chloride (Saline Flush)  10 ml FLUSH ASDIRECTED PRN


   PRN Reason: Keep Vein Open


   Last Admin: 01/01/20 12:45  Dose: 10 ml








Labs: 


 Laboratory Tests











  01/01/20 01/01/20 01/01/20 Range/Units





  12:41 12:41 14:17 


 


WBC  9.2    (4.5-11.0)  K/uL


 


RBC  4.96    (3.30-5.50)  M/uL


 


Hgb  15.8 H D    (12.0-15.0)  g/dL


 


Hct  47.6    (36.0-48.0)  %


 


MCV  96    (80-98)  fL


 


MCH  32 H    (27-31)  pg


 


MCHC  33    (32-36)  %


 


Plt Count  343    (150-400)  K/uL


 


Neut % (Auto)  67 H    (36-66)  %


 


Lymph % (Auto)  28    (24-44)  %


 


Mono % (Auto)  5    (2-6)  %


 


Eos % (Auto)  0 L    (2-4)  %


 


Baso % (Auto)  0    (0-1)  %


 


Puncture Site    Lt.radial  


 


ABG pH    7.440  (7.350-7.450)  


 


ABG pCO2    34.7 L  (35.0-42.0)  mmHg


 


ABG pO2    65.8 L  (75.0-100.0)  mmHg


 


ABG HCO3    23.2  (22.0-26.0)  mmol/L


 


ABG Total CO2    19.7 L  (21.0-25.0)  mmol/L


 


ABG O2 Saturation    92.8 L  (95.0-98.0)  %


 


ABG O2 Content    20.3  (15.0-23.0)  %vol


 


ABG Base Excess    0.1  mm/L


 


ABG Hemoglobin    16.0  (12.0-16.0)  g/dL


 


ABG Oxyhemoglobin    90.5  %


 


ABG Carboxyhemoglobin    1.8 H  (0.0-1.6)  %


 


ABG Methemoglobin    0.7  %


 


Hugo Test    Passed  


 


O2 Delivery Device    Room air  


 


Sodium   140   (140-148)  mmol/L


 


Potassium   4.4   (3.6-5.2)  mmol/L


 


Chloride   104   (100-108)  mmol/L


 


Carbon Dioxide   26   (21-32)  mmol/L


 


Anion Gap   9.6   (5.0-14.0)  mmol/L


 


BUN   12   (7-18)  mg/dL


 


Creatinine   0.8   (0.6-1.0)  mg/dL


 


Est Cr Clr Drug Dosing   63.93   mL/min


 


Estimated GFR (MDRD)   > 60   (>60)  


 


Glucose   123 H   ()  mg/dL


 


Calcium   9.3   (8.5-10.1)  mg/dL


 


Total Bilirubin   0.2   (0.2-1.0)  mg/dL


 


AST   15   (15-37)  U/L


 


ALT   25   (12-78)  U/L


 


Alkaline Phosphatase   101   ()  U/L


 


Total Protein   6.7   (6.4-8.2)  g/dL


 


Albumin   3.8   (3.4-5.0)  g/dL


 


Globulin   2.9   (2.3-3.5)  g/dL


 


Albumin/Globulin Ratio   1.3   (1.2-2.2)  











Meds: 


Medications











Generic Name Dose Route Start Last Admin





  Trade Name Freq  PRN Reason Stop Dose Admin


 


Sodium Chloride  10 ml  01/01/20 12:32  01/01/20 12:45





  Saline Flush  FLUSH   10 ml





  ASDIRECTED PRN   Administration





  Keep Vein Open   





     





     





     














Discontinued Medications














Generic Name Dose Route Start Last Admin





  Trade Name Freq  PRN Reason Stop Dose Admin


 


Albuterol  2.5 mg  01/01/20 12:31  01/01/20 12:45





  Proventil Neb Soln  NEB  01/01/20 12:32  2.5 mg





  ONETIME ONE   Administration





     





     





     





     


 


Albuterol/Ipratropium  3 ml  01/01/20 14:19  01/01/20 14:27





  Duoneb 3.0-0.5 Mg/3 Ml  NEB  01/01/20 14:20  3 ml





  ONETIME ONE   Administration





     





     





     





     


 


Methylprednisolone Sodium Succinate  125 mg  01/01/20 12:33  01/01/20 12:45





  Solu-Medrol  IVPUSH  01/01/20 12:34  125 mg





  ONETIME ONE   Administration





     





     





     





     














- Re-Assessments/Exams


Free Text/Narrative Re-Assessment/Exam: 





01/01/20 15:28


pt was given solumedrol 125 iv and 2 nebs. First a albuterol neb and then a 

duoneb. She did get much more relief with the duoneb.  Pt is breathing much 

more comfortable. Her lab work looks ok. blood gases were not alarming. She is 

sating at 91-92. 





Departure





- Departure


Time of Disposition: 15:16


Disposition: Home, Self-Care 01


Condition: Fair


Clinical Impression: 


 Bronchospasm, Bronchitis








- Discharge Information


Referrals: 


Cameron Gallegos MD [Primary Care Provider] - 


Forms:  ED Department Discharge


Care Plan Goals: 


push fluids, cool mist humidfier, duonebs qid May use 2 albuterol nebs inbetween

,  cont with the z pack and the predisone as ordered from the clinic,  appt 

with Dr Gallegos on Monday.  rtc if not doing well. 





Sepsis Event Note





- Focused Exam


Vital Signs: 


 Vital Signs











  Temp Pulse Resp BP Pulse Ox


 


 01/01/20 15:06   95  18  147/86 H  92 L


 


 01/01/20 12:34  36.4 C  85   165/99 H  93 L


 


 01/01/20 12:22  36.4 C  85  18  165/99 H  93 L











Date Exam was Performed: 01/01/20


Time Exam was Performed: 15:23





- My Orders


Last 24 Hours: 


My Active Orders





01/01/20 12:31


RT Aerosol Therapy [RC] ASDIRECTED 





01/01/20 12:32


Sodium Chloride 0.9% [Saline Flush]   10 ml FLUSH ASDIRECTED PRN 


Saline Lock Insert [OM.PC] Routine 





01/01/20 14:19


RT Aerosol Therapy [RC] ASDIRECTED 














- Assessment/Plan


Last 24 Hours: 


My Active Orders





01/01/20 12:31


RT Aerosol Therapy [RC] ASDIRECTED 





01/01/20 12:32


Sodium Chloride 0.9% [Saline Flush]   10 ml FLUSH ASDIRECTED PRN 


Saline Lock Insert [OM.PC] Routine 





01/01/20 14:19


RT Aerosol Therapy [RC] ASDIRECTED

## 2020-01-01 NOTE — CRLCR
Indication:



Shortness of breath. Wheezing.



Technique:



PA and lateral views the chest.



Comparison:



September 12, 2019.



Findings:



The heart is normal in size. The lungs are hyperinflated. No infiltrate, 

pleural effusion, or pneumothorax is identified.



Impression:



Hyperinflation



Dictated by Janae Maravilla MD @ Jan 1 2020  1:21PM



Signed by Dr. Janae Maravilla @ Jan 1 2020  1:22PM

## 2020-02-23 NOTE — EDM.PDOC
ED HPI GENERAL MEDICAL PROBLEM





- General


Chief Complaint: Genitourinary Problem


Stated Complaint: BLADDER ISSUES


Time Seen by Provider: 02/23/20 11:43


Source of Information: Reports: Patient


History Limitations: Reports: No Limitations





- History of Present Illness


INITIAL COMMENTS - FREE TEXT/NARRATIVE: 





pt has had 2 days of burning and urinary frequency. She states she has been 

very unconmfortable at times. She feels that she drinks alot of water. She has 

noted some blood when the urine is passed. This is the first UTI that she has 

had. 


Onset: Other ( started yesterday. )


Duration: Hour(s):


Location: Reports: Abdomen, Other ( she has some discomfort in the left flank 

at times. She has not had a high fever. )


Associated Symptoms: Reports: No Other Symptoms


  ** pelvic pressure


Pain Score (Numeric/FACES): 5





- Related Data


 Allergies











Allergy/AdvReac Type Severity Reaction Status Date / Time


 


nickel Allergy  Rash Verified 01/01/20 12:22











Home Meds: 


 Home Meds





Aspirin [Adult Low Dose Aspirin EC] 81 mg PO DAILY 09/10/13 [History]


Calcium Carb/Vitamin D3/Vit K1 [Calcium + D Soft Chewable Tab] 1 each PO DAILY 

09/10/13 [History]


Multivitamin [Multi-Vitamin Daily] 1 tab PO MOWEFR@0900 09/10/13 [History]


Metoprolol Succinate 50 mg PO BID 09/11/13 [History]


Albuterol/Ipratropium [Combivent] 1 puff INH QID 09/01/17 [History]


Aspirin/Caffeine [Percy Back-Body 500-32.5 mg] 2 tab PO BID PRN 12/04/18 [

History]


Albuterol [Proventil Neb Soln] 2.5 mg INH Q4H PRN 08/26/19 [History]


Fluticasone/Vilanterol [Breo Ellipta 100-25 MCG Inhalation Kit] 1 each IH DAILY 

08/26/19 [History]











Past Medical History


HEENT History: Reports: Hard of Hearing, Impaired Vision


Cardiovascular History: Reports: CAD, Hypertension, Other (See Below)


Other Cardiovascular History: ablation 2013


Respiratory History: Reports: COPD, SOB


Gastrointestinal History: Reports: Other (See Below)


Other Gastrointestinal History: colitis


Genitourinary History: Reports: None


OB/GYN History: Reports: Pregnancy


Musculoskeletal History: Reports: Back Pain, Chronic


Other Musculoskeletal History: LAMINECTOMY 2017 AUGUST.  L lower back pain 

radiating to L hip down to L knee.  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


Neurological History: Reports: Other (See Below)


Other Neuro History: AVM [id number]


Psychiatric History: Reports: None


Endocrine/Metabolic History: Reports: None


Hematologic History: Reports: None


Immunologic History: Reports: None


Oncologic (Cancer) History: Reports: None


Dermatologic History: Reports: None





- Infectious Disease History


Infectious Disease History: Reports: Chicken Pox, Measles





- Past Surgical History


Head Surgeries/Procedures: Reports: None


HEENT Surgical History: Reports: Tonsillectomy, Other (See Below)


Other HEENT Surgeries/Procedures: ear surg


Cardiovascular Surgical History: Reports: None


Respiratory Surgical History: Reports: None


GI Surgical History: Reports: Colonoscopy


Female  Surgical History: Reports: Salpingo-Oophorectomy


Neurological Surgical History: Reports: Laminectomy


Musculoskeletal Surgical History: Reports: None, Hip Replacement


Dermatological Surgical History: Reports: None





Social & Family History





- Family History


Family Medical History: Noncontributory





- Tobacco Use


Smoking Status *Q: Current Every Day Smoker


Years of Tobacco use: 40


Packs/Tins Daily: 0.5





- Caffeine Use


Caffeine Use: Reports: Coffee


Caffeine Use Comment: Daily cup of coffee





- Alcohol Use


Days Per Week of Alcohol Use: 7


Number of Drinks Per Day: 3


Total Drinks Per Week: 21





- Recreational Drug Use


Recreational Drug Use: No





- Living Situation & Occupation


Living situation: Reports: 


Occupation: Retired (lives with , retired, in [place], [place]. has three 

grown children)





ED ROS GENERAL





- Review of Systems


Review Of Systems: See Below


Constitutional: Reports: Chills


HEENT: Reports: No Symptoms


Respiratory: Reports: No Symptoms


Cardiovascular: Reports: No Symptoms


Endocrine: Reports: No Symptoms


GI/Abdominal: Reports: Other ( some left flank pain. )


: Reports: Dysuria, Flank Pain, Frequency, Hematuria, Urgency


Musculoskeletal: Reports: No Symptoms


Skin: Reports: No Symptoms





ED EXAM, GI/ABD





- Physical Exam


Exam: See Below


Text/Narrative:: 





pt arrived with dysuria. She has slight blood present 


Exam Limited By: No Limitations


General Appearance: Alert, Moderate Distress


Ears: Normal TMs


Nose: Normal Inspection


Throat/Mouth: Normal Inspection


Head: Atraumatic


Neck: Normal Inspection


Respiratory/Chest: No Respiratory Distress


Cardiovascular: Regular Rate, Rhythm


GI/Abdominal Exam: Soft, Other (mild flnk tenderness)


 (Female) Exam: Deferred


Rectal (Female) Exam: Deferred


Extremities: Normal Inspection


Neurological: Alert, Oriented, Normal Cognition





Course





- Vital Signs


Last Recorded V/S: 





 Last Vital Signs











Temp  35.6 C L  02/23/20 11:25


 


Pulse  100   02/23/20 11:25


 


Resp  17   02/23/20 11:25


 


BP  160/102 H  02/23/20 11:25


 


Pulse Ox  91 L  02/23/20 11:25














- Orders/Labs/Meds


Orders: 





 Active Orders 24 hr











 Category Date Time Status


 


 CULTURE URINE [RM] Stat Lab  02/23/20 11:39 Ordered


 


 Phenazopyridine [Urinary Pain Relief] Med  02/23/20 11:42 Once





 190 mg PO ONETIME ONE   


 


 cefTRIAXone 1 GM,Lidocaine 1% 2.1 ML Med  02/23/20 11:42 Ordered





 cefTRIAXone [Rocephin] 1 gm   





 Lidocaine 1% [Xylocaine-MPF 1%] 2.1 ml   





 IM ONETIME   








 Medication Orders





Ceftriaxone Sodium 1 gm/ (Lidocaine HCl 2.1 ml)  0 gm IM ONETIME ONE


   Stop: 02/23/20 11:43








Labs: 





 Laboratory Tests











  02/23/20 Range/Units





  11:19 


 


Urine Color  Yellow  (YELLOW)  


 


Urine Appearance  Cloudy A  (CLEAR)  


 


Urine pH  6.5  (5.0-8.0)  


 


Ur Specific Gravity  1.020  (1.008-1.030)  


 


Urine Protein  100 H  (NEGATIVE)  mg/dL


 


Urine Glucose (UA)  Negative  (NEGATIVE)  mg/dL


 


Urine Ketones  Negative  (NEGATIVE)  mg/dL


 


Urine Occult Blood  Large H  (NEGATIVE)  


 


Urine Nitrite  Positive H  (NEGATIVE)  


 


Urine Bilirubin  Negative  (NEGATIVE)  


 


Urine Urobilinogen  0.2  (0.2-1.0)  EU/dL


 


Ur Leukocyte Esterase  Large H  (NEGATIVE)  


 


Urine RBC  50-75 H  (0-5)  


 


Urine WBC  Packed H  (0-5)  


 


Ur Epithelial Cells  Few  


 


Amorphous Sediment  Not seen  


 


Urine Bacteria  Many  


 


Urine Mucus  Few  











Meds: 





Medications











Generic Name Dose Route Start Last Admin





  Trade Name Freq  PRN Reason Stop Dose Admin


 


Ceftriaxone Sodium 1 gm/  0 gm  02/23/20 11:42  





  Lidocaine HCl 2.1 ml  IM  02/23/20 11:43  





  ONETIME ONE   





     





     





     





     














- Re-Assessments/Exams


Free Text/Narrative Re-Assessment/Exam: 





02/23/20 11:48


urine appears very infected.  Pt was given a gram of rocephen im and will be 

discharged on cipro. 





Departure





- Departure


Time of Disposition: 11:51


Disposition: Home, Self-Care 01


Condition: Fair


Clinical Impression: 


 UTI (urinary tract infection)








- Discharge Information


Referrals: 


Cameron Gallegos MD [Primary Care Provider] - 


Care Plan Goals: 


push fluids, cipro 500mg bid for 7 days, pyridium 200mg tid for 2 days, 





Sepsis Event Note





- Evaluation


Sepsis Screening Result: No Definite Risk





- Focused Exam


Vital Signs: 





 Vital Signs











  Temp Pulse Resp BP Pulse Ox


 


 02/23/20 11:25  35.6 C L  100  17  160/102 H  91 L


 


 02/23/20 11:13  35.6 C L  100  17  160/102 H  91 L











Date Exam was Performed: 02/23/20


Time Exam was Performed: 11:43





- My Orders


Last 24 Hours: 





My Active Orders





02/23/20 11:39


CULTURE URINE [RM] Stat 





02/23/20 11:42


Phenazopyridine [Urinary Pain Relief]   190 mg PO ONETIME ONE 


cefTRIAXone 1 GM,Lidocaine 1% 2.1 ML cefTRIAXone [Rocephin] 1 gm Lidocaine 1% [

Xylocaine-MPF 1%] 2.1 ml IM ONETIME 














- Assessment/Plan


Last 24 Hours: 





My Active Orders





02/23/20 11:39


CULTURE URINE [RM] Stat 





02/23/20 11:42


Phenazopyridine [Urinary Pain Relief]   190 mg PO ONETIME ONE 


cefTRIAXone 1 GM,Lidocaine 1% 2.1 ML cefTRIAXone [Rocephin] 1 gm Lidocaine 1% [

Xylocaine-MPF 1%] 2.1 ml IM ONETIME

## 2020-04-13 NOTE — EDM.PDOC
ED HPI GENERAL MEDICAL PROBLEM





- General


Chief Complaint: Respiratory Problem


Stated Complaint: SHORTNESS OF BREATH


Time Seen by Provider: 04/13/20 14:40


Source of Information: Reports: Patient


History Limitations: Reports: No Limitations





- History of Present Illness


INITIAL COMMENTS - FREE TEXT/NARRATIVE: 





64-year-old female with COPD, has been more short of breath over the past 2 to 

3 days than her usual baseline.  She just finished a course of antibiotics 2 

days ago for "sinusitis".  No fevers or chills, cough is nonproductive.  She is 

having problems with intermittent numbness of her lips, no chest pain.  She is 

using her nebulizers at home but seems to be having no significant benefit.  

She does have a mild sore throat, rhinitis, but no body aches or headache.


Onset: Gradual (Symptoms for the last 2 to 3 days)


Associated Symptoms: Reports: Cough, Shortness of Breath, Other (Recent 

sinusitis treatment).  Denies: Fever/Chills, Headaches, Nausea/Vomiting


  ** Middle Abdomen


Pain Score (Numeric/FACES): 6





- Related Data


 Allergies











Allergy/AdvReac Type Severity Reaction Status Date / Time


 


nickel Allergy  Rash Verified 01/01/20 12:22











Home Meds: 


 Home Meds





Aspirin [Adult Low Dose Aspirin EC] 81 mg PO DAILY 09/10/13 [History]


Calcium Carb/Vitamin D3/Vit K1 [Calcium + D Soft Chewable Tab] 1 each PO DAILY 

09/10/13 [History]


Multivitamin [Multi-Vitamin Daily] 1 tab PO MOWEFR@0900 09/10/13 [History]


Metoprolol Succinate 50 mg PO BID 09/11/13 [History]


Albuterol/Ipratropium [Combivent] 1 puff INH QID 09/01/17 [History]


Aspirin/Caffeine [Percy Back-Body 500-32.5 mg] 2 tab PO BID PRN 12/04/18 [

History]


Albuterol [Proventil Neb Soln] 2.5 mg INH Q4H PRN 08/26/19 [History]


Fluticasone/Vilanterol [Breo Ellipta 100-25 MCG Inhalation Kit] 1 each IH DAILY 

08/26/19 [History]


Albuterol/Ipratropium [DuoNeb 3.0-0.5 MG/3 ML] 3 ml INH QID 04/13/20 [History]











Past Medical History


HEENT History: Reports: Hard of Hearing, Impaired Vision


Cardiovascular History: Reports: CAD, Hypertension, Other (See Below)


Other Cardiovascular History: ablation 2013


Respiratory History: Reports: COPD, SOB


Gastrointestinal History: Reports: Other (See Below)


Other Gastrointestinal History: colitis


Genitourinary History: Reports: None


OB/GYN History: Reports: Pregnancy


Musculoskeletal History: Reports: Back Pain, Chronic


Other Musculoskeletal History: LAMINECTOMY 2017 AUGUST.  L lower back pain 

radiating to L hip down to L knee.  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


Neurological History: Reports: Other (See Below)


Other Neuro History: AVM [id number]


Psychiatric History: Reports: None


Endocrine/Metabolic History: Reports: None


Hematologic History: Reports: None


Immunologic History: Reports: None


Oncologic (Cancer) History: Reports: None


Dermatologic History: Reports: None





- Infectious Disease History


Infectious Disease History: Reports: Chicken Pox, Measles





- Past Surgical History


Head Surgeries/Procedures: Reports: None


HEENT Surgical History: Reports: Tonsillectomy, Other (See Below)


Other HEENT Surgeries/Procedures: ear surg


Respiratory Surgical History: Reports: None


GI Surgical History: Reports: Colonoscopy


Female  Surgical History: Reports: Salpingo-Oophorectomy


Musculoskeletal Surgical History: Reports: None, Hip Replacement


Dermatological Surgical History: Reports: None





Social & Family History





- Family History


Family Medical History: Noncontributory





- Tobacco Use


Smoking Status *Q: Current Every Day Smoker


Years of Tobacco use: 50


Packs/Tins Daily: 0.5


Used Tobacco, but Quit: No





- Caffeine Use


Caffeine Use: Reports: Coffee, Soda, Tea


Caffeine Use Comment: Daily cup of coffee





- Recreational Drug Use


Recreational Drug Use: No





- Living Situation & Occupation


Living situation: Reports: 


Occupation: Retired (lives with , retired, in [place], [place]. has three 

grown children)





ED ROS GENERAL





- Review of Systems


Review Of Systems: See Below


Constitutional: Denies: Fever, Chills


HEENT: Reports: Rhinitis, Throat Pain.  Denies: Ear Pain


Respiratory: Reports: Shortness of Breath, Wheezing, Cough.  Denies: Sputum


Cardiovascular: Denies: Chest Pain


GI/Abdominal: Denies: Nausea, Vomiting


Skin: Reports: No Symptoms


Neurological: Reports: Paresthesia (Numbness of the lips is intermittent, 

likely related to hyperventilation).  Denies: Dizziness, Headache


Psychiatric: Reports: No Symptoms





ED EXAM, GENERAL





- Physical Exam


Exam: See Below


Exam Limited By: No Limitations


General Appearance: Alert, No Apparent Distress, Other (Initial eval showed her 

O2 sats were 93% on room air so 1 L of nasal cannula oxygen was supplied)


Throat/Mouth: Normal Inspection


Head: Atraumatic


Neck: Normal Inspection


Respiratory/Chest: No Respiratory Distress, Wheezing (Diffuse expiratory 

wheezes are heard throughout, especially the upper lung fields)


Cardiovascular: Regular Rate, Rhythm


Neurological: Alert, Oriented


Psychiatric: Normal Affect, Normal Mood





Course





- Vital Signs


Last Recorded V/S: 


 Last Vital Signs











Temp  96 F L  04/14/20 12:13


 


Pulse  91   04/14/20 14:52


 


Resp  16   04/14/20 12:13


 


BP  143/82 H  04/14/20 12:13


 


Pulse Ox  90 L  04/14/20 12:13














- Orders/Labs/Meds


Orders: 


 Medication Orders





Acetaminophen (Tylenol)  650 mg PO Q4H PRN


   PRN Reason: Pain (Mild 1-3)/fever


   Last Admin: 04/14/20 07:26  Dose: 650 mg


Albuterol (Proventil Neb Soln)  2.5 mg NEB Q4H PRN


   PRN Reason: Shortness Of Breath/wheezing


   Last Admin: 04/14/20 05:06  Dose: 2.5 mg


Albuterol/Ipratropium (Duoneb 3.0-0.5 Mg/3 Ml)  3 ml NEB QIDRT [facility]


   Last Admin: 04/14/20 14:51  Dose: 3 ml


   Admin: 04/14/20 10:51  Dose: 3 ml


   Admin: 04/14/20 07:03  Dose:  


   Admin: 04/13/20 20:44  Dose: 3 ml


Aspirin (Halfprin)  81 mg PO DAILY [facility]


   Last Admin: 04/14/20 09:54  Dose: 81 mg


Enoxaparin Sodium (Lovenox)  40 mg SUBCUT Q24H [facility]


   Last Admin: 04/13/20 18:07  Dose: 40 mg


Methylprednisolone Sodium Succinate (Solu-Medrol)  40 mg IVPUSH Q12H [facility]


Metoprolol Succinate (Toprol Xl)  50 mg PO BID [facility]


   Last Admin: 04/14/20 10:00  Dose:  


Ondansetron HCl (Zofran)  4 mg IV Q4H PRN


   PRN Reason: Nausea/Vomiting


Polyethylene Glycol (Miralax)  17 gm PO DAILY PRN


   PRN Reason: Constipation


Fluticasone/Salmeterol (Fluticasone-Salmeterol 113-14 Mcg Powder Inh)  1 puff 

INH BIDRT DEREK


   Last Admin: 04/14/20 13:13  Dose: 1 puff


Sodium Chloride (Saline Flush)  10 ml FLUSH ASDIRECTED PRN


   PRN Reason: Keep Vein Open








Labs: 


 Laboratory Tests











  04/13/20 04/13/20 Range/Units





  15:39 15:39 


 


WBC  9.2   (4.5-11.0)  K/uL


 


RBC  4.53   (3.30-5.50)  M/uL


 


Hgb  15.8 H   (12.0-15.0)  g/dL


 


Hct  46.2   (36.0-48.0)  %


 


MCV  102 H   (80-98)  fL


 


MCH  35 H   (27-31)  pg


 


MCHC  34   (32-36)  %


 


Plt Count  296   (150-400)  K/uL


 


Neut % (Auto)  36   (36-66)  %


 


Lymph % (Auto)  51 H   (24-44)  %


 


Mono % (Auto)  11 H   (2-6)  %


 


Eos % (Auto)  1 L   (2-4)  %


 


Baso % (Auto)  1   (0-1)  %


 


Sodium   142  (140-148)  mmol/L


 


Potassium   4.2  (3.6-5.2)  mmol/L


 


Chloride   104  (100-108)  mmol/L


 


Carbon Dioxide   27  (21-32)  mmol/L


 


Anion Gap   10.6  (5.0-14.0)  mmol/L


 


BUN   8  (7-18)  mg/dL


 


Creatinine   0.8  (0.6-1.0)  mg/dL


 


Est Cr Clr Drug Dosing   63.93  mL/min


 


Estimated GFR (MDRD)   > 60  (>60)  


 


Glucose   107 H  ()  mg/dL


 


Calcium   8.5  (8.5-10.1)  mg/dL


 


Total Bilirubin   0.4  D  (0.2-1.0)  mg/dL


 


AST   33  D  (15-37)  U/L


 


ALT   39  (12-78)  U/L


 


Alkaline Phosphatase   97  ()  U/L


 


Total Protein   5.9 L  (6.4-8.2)  g/dL


 


Albumin   3.3 L  (3.4-5.0)  g/dL


 


Globulin   2.6  (2.3-3.5)  g/dL


 


Albumin/Globulin Ratio   1.3  (1.2-2.2)  











Meds: 


Medications











Generic Name Dose Route Start Last Admin





  Trade Name Clarenceq  PRN Reason Stop Dose Admin


 


Acetaminophen  650 mg  04/13/20 17:21  04/14/20 07:26





  Tylenol  PO   650 mg





  Q4H PRN   Administration





  Pain (Mild 1-3)/fever   





     





     





     


 


Albuterol  2.5 mg  04/13/20 17:21  04/14/20 05:06





  Proventil Neb Soln  NEB   2.5 mg





  Q4H PRN   Administration





  Shortness Of Breath/wheezing   





     





     





     


 


Albuterol/Ipratropium  3 ml  04/13/20 21:00  04/14/20 14:51





  Duoneb 3.0-0.5 Mg/3 Ml  NEB   3 ml





  QIDRT DEREK   Administration





     





     





     





     


 


Aspirin  81 mg  04/14/20 09:00  04/14/20 09:54





  Halfprin  PO   81 mg





  DAILY DEREK   Administration





     





     





     





     


 


Enoxaparin Sodium  40 mg  04/13/20 18:00  04/13/20 18:07





  Lovenox  SUBCUT   40 mg





  Q24H DEREK   Administration





     





     





     





     


 


Methylprednisolone Sodium Succinate  40 mg  04/14/20 22:00  





  Solu-Medrol  IVPUSH   





  Q12H DEREK   





     





     





     





     


 


Metoprolol Succinate  50 mg  04/14/20 09:00  04/14/20 10:00





  Toprol Xl  PO   Not Given





  BID DEREK   





     





     





     





     


 


Ondansetron HCl  4 mg  04/13/20 17:21  





  Zofran  IV   





  Q4H PRN   





  Nausea/Vomiting   





     





     





     


 


Polyethylene Glycol  17 gm  04/13/20 17:21  





  Miralax  PO   





  DAILY PRN   





  Constipation   





     





     





     


 


Fluticasone/Salmeterol  1 puff  04/14/20 11:30  04/14/20 13:13





  Fluticasone-Salmeterol 113-14 Mcg Powder Inh  INH   1 puff





  BIDRT DEREK   Administration





     





     





     





     


 


Sodium Chloride  10 ml  04/13/20 17:21  





  Saline Flush  FLUSH   





  ASDIRECTED PRN   





  Keep Vein Open   





     





     





     














Discontinued Medications














Generic Name Dose Route Start Last Admin





  Trade Name Clarenceq  PRN Reason Stop Dose Admin


 


Albuterol/Ipratropium  3 ml  04/13/20 14:49  04/13/20 14:56





  Duoneb 3.0-0.5 Mg/3 Ml  NEB  04/13/20 14:50  3 ml





  ONETIME ONE   Administration





     





     





     





     


 


Sodium Chloride  1,000 mls @ 150 mls/hr  04/13/20 15:45  04/13/20 16:01





  Normal Saline  IV   150 mls/hr





  ASDIRECTED DEREK   Administration





     





     





     





     


 


Methylprednisolone Sodium Succinate  125 mg  04/13/20 15:45  04/13/20 16:01





  Solu-Medrol  IVPUSH  04/13/20 15:46  125 mg





  ONETIME ONE   Administration





     





     





     





     


 


Methylprednisolone Sodium Succinate  40 mg  04/13/20 22:00  04/14/20 10:51





  Solu-Medrol  IVPUSH   40 mg





  Q6H DEREK   Administration





     





     





     





     














- Re-Assessments/Exams


Free Text/Narrative Re-Assessment/Exam: 





04/13/20 14:59


After the 1 L of nasal cannula oxygen was applied, a DuoNeb was given and then 

a two-view chest x-ray obtained.


04/13/20 15:43


Chest x-ray shows no infiltrate in fact appears normal, however her O2 

saturations dropped to 86 to 88% with even a small amount of activity.  An IV 

was started, CBC and CMP obtained and the patient will be given 125 mg of IV 

Solu-Medrol.  Discussed her condition with the hospitalist service to consider 

admission.





Departure





- Departure


Time of Disposition: 17:20


Disposition: Refer to Observation


Clinical Impression: 


 COPD with exacerbation








- Discharge Information





Sepsis Event Note





- Evaluation


Sepsis Screening Result: No Definite Risk





- Focused Exam


Date Exam was Performed: 04/14/20


Time Exam was Performed: 17:17

## 2020-04-13 NOTE — PCM.HP.2
H&P History of Present Illness





- General


Date of Service: 04/13/20


Admit Problem/Dx: 


 Admission Diagnosis/Problem





Admission Diagnosis/Problem      COPD, Moderate chronic obstructive pulmonary


                                 disease








Source of Information: Patient, Provider, RN Notes Reviewed


History Limitations: Reports: No Limitations





- History of Present Illness


Initial Comments - Free Text/Narative: 





Ms. Bonilla is a 64-year-old woman who was admitted to observation status 

through the emergency department, for further management of COPD exacerbation 

and hypoxia.  Ms. Bonilla has a known history of COPD but does not use 

oxygen at home.  She reports that she is felt ill most of the winter with 

intermittent exacerbations of her COPD, managed with antibiotics and 

prednisone.  She just completed a course of antibiotic therapy with 

azithromycin and was feeling well until 2 days prior to admission when she 

began to develop increased shortness of breath associated with a dry and 

nonproductive cough.  Because of increased symptoms she presented to the 

emergency department and was noted to be hypoxic after fairly minimal activity.

  White blood cell count is within normal range and chest x-ray shows no 

significant infiltrates.  She denies recent fever, purulent sputum, or chest 

pain.





- Related Data


Allergies/Adverse Reactions: 


 Allergies











Allergy/AdvReac Type Severity Reaction Status Date / Time


 


nickel Allergy  Rash Verified 01/01/20 12:22











Home Medications: 


 Home Meds





Aspirin [Adult Low Dose Aspirin EC] 81 mg PO DAILY 09/10/13 [History]


Calcium Carb/Vitamin D3/Vit K1 [Calcium + D Soft Chewable Tab] 1 each PO DAILY 

09/10/13 [History]


Multivitamin [Multi-Vitamin Daily] 1 tab PO MOWEFR@0900 09/10/13 [History]


Metoprolol Succinate 50 mg PO BID 09/11/13 [History]


Albuterol/Ipratropium [Combivent] 1 puff INH QID 09/01/17 [History]


Aspirin/Caffeine [Percy Back-Body 500-32.5 mg] 2 tab PO BID PRN 12/04/18 [

History]


Albuterol [Proventil Neb Soln] 2.5 mg INH Q4H PRN 08/26/19 [History]


Fluticasone/Vilanterol [Breo Ellipta 100-25 MCG Inhalation Kit] 1 each IH DAILY 

08/26/19 [History]


Albuterol/Ipratropium [DuoNeb 3.0-0.5 MG/3 ML] 3 ml INH QID 04/13/20 [History]











Past Medical History


HEENT History: Reports: Hard of Hearing, Impaired Vision


Cardiovascular History: Reports: CAD, Hypertension, Other (See Below)


Other Cardiovascular History: ablation 2013


Respiratory History: Reports: COPD, SOB


Gastrointestinal History: Reports: Other (See Below)


Other Gastrointestinal History: colitis


Genitourinary History: Reports: None


OB/GYN History: Reports: Pregnancy


Musculoskeletal History: Reports: Back Pain, Chronic


Other Musculoskeletal History: LAMINECTOMY 2017 AUGUST.  L lower back pain 

radiating to L hip down to L knee.  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


Neurological History: Reports: Other (See Below)


Other Neuro History: AVM [id number]


Psychiatric History: Reports: None


Endocrine/Metabolic History: Reports: None


Hematologic History: Reports: None


Immunologic History: Reports: None


Oncologic (Cancer) History: Reports: None


Dermatologic History: Reports: None





- Infectious Disease History


Infectious Disease History: Reports: Chicken Pox, Measles





- Past Surgical History


Head Surgeries/Procedures: Reports: None


HEENT Surgical History: Reports: Tonsillectomy, Other (See Below)


Other HEENT Surgeries/Procedures: ear surg


Respiratory Surgical History: Reports: None


GI Surgical History: Reports: Colonoscopy


Female  Surgical History: Reports: Salpingo-Oophorectomy


Musculoskeletal Surgical History: Reports: None, Hip Replacement


Dermatological Surgical History: Reports: None





Social & Family History





- Family History


Family Medical History: Noncontributory





- Tobacco Use


Smoking Status *Q: Current Every Day Smoker


Years of Tobacco use: 50


Packs/Tins Daily: 0.5


Used Tobacco, but Quit: No





- Caffeine Use


Caffeine Use: Reports: Coffee, Soda, Tea


Caffeine Use Comment: Daily cup of coffee





- Recreational Drug Use


Recreational Drug Use: No





- Living Situation & Occupation


Living situation: Reports: 


Occupation: Retired (lives with , retired, in [place], [place]. has three 

grown children)





H&P Review of Systems





- Review of Systems:


Review Of Systems: See Below


General: Reports: No Symptoms


HEENT: Reports: No Symptoms


Pulmonary: Reports: Shortness of Breath, Wheezing, Cough.  Denies: Pleuritic 

Chest Pain, Sputum, Hemoptysis


Cardiovascular: Reports: Dyspnea on Exertion.  Denies: Chest Pain, Palpitations

, Orthopnea, PND, Edema, Lightheadedness


Gastrointestinal: Reports: No Symptoms


Genitourinary: Reports: No Symptoms


Musculoskeletal: Reports: No Symptoms


Skin: Reports: No Symptoms


Psychiatric: Reports: No Symptoms


Neurological: Reports: No Symptoms


Hematologic/Lymphatic: Reports: No Symptoms


Immunologic: Reports: No Symptoms





Exam





- Exam


Exam: See Below





- Vital Signs


Vital Signs: 


 Last Vital Signs











Temp  97.2 F   04/13/20 14:25


 


Pulse  95   04/13/20 16:05


 


Resp  15   04/13/20 16:05


 


BP  151/99 H  04/13/20 16:05


 


Pulse Ox  96   04/13/20 16:05











Weight: 177 lb





- Exam


Quality Assessment: Supplemental Oxygen, DVT Prophylaxis


General: Alert, Oriented, Cooperative, Mild Distress


HEENT: Conjunctiva Clear, Hearing Intact, Mucosa Moist & Pink, Normal Nasal 

Septum, Posterior Pharynx Clear, Pupils Equal


Neck: Supple, Trachea Midline, +2 Carotid Pulse wo Bruit


Lungs: Decreased Breath Sounds, Wheezing.  No: Rales, Rhonchi


Cardiovascular: Regular Rate, Regular Rhythm, Normal S1, Normal S2


GI/Abdominal Exam: Soft, Non-Tender, No Organomegaly, No Distention


Back Exam: Normal Inspection, Full Range of Motion


Extremities: Non-Tender, No Pedal Edema


Skin: Warm, Dry, Intact


Neurological: Cranial Nerves Intact, Strength Equal Bilateral, Normal Speech, 

Normal Tone, Sensation Intact.  No: Focal Deficit


Neuro Extensive - Mental Status: Alert, Oriented x3, Normal Mood/Affect, Normal 

Cognition, Memory Intact





- Patient Data


Lab Results Last 24 hrs: 


 Laboratory Results - last 24 hr











  04/13/20 04/13/20 Range/Units





  15:39 15:39 


 


WBC  9.2   (4.5-11.0)  K/uL


 


RBC  4.53   (3.30-5.50)  M/uL


 


Hgb  15.8 H   (12.0-15.0)  g/dL


 


Hct  46.2   (36.0-48.0)  %


 


MCV  102 H   (80-98)  fL


 


MCH  35 H   (27-31)  pg


 


MCHC  34   (32-36)  %


 


Plt Count  296   (150-400)  K/uL


 


Neut % (Auto)  36   (36-66)  %


 


Lymph % (Auto)  51 H   (24-44)  %


 


Mono % (Auto)  11 H   (2-6)  %


 


Eos % (Auto)  1 L   (2-4)  %


 


Baso % (Auto)  1   (0-1)  %


 


Sodium   142  (140-148)  mmol/L


 


Potassium   4.2  (3.6-5.2)  mmol/L


 


Chloride   104  (100-108)  mmol/L


 


Carbon Dioxide   27  (21-32)  mmol/L


 


Anion Gap   10.6  (5.0-14.0)  mmol/L


 


BUN   8  (7-18)  mg/dL


 


Creatinine   0.8  (0.6-1.0)  mg/dL


 


Est Cr Clr Drug Dosing   63.93  mL/min


 


Estimated GFR (MDRD)   > 60  (>60)  


 


Glucose   107 H  ()  mg/dL


 


Calcium   8.5  (8.5-10.1)  mg/dL


 


Total Bilirubin   0.4  D  (0.2-1.0)  mg/dL


 


AST   33  D  (15-37)  U/L


 


ALT   39  (12-78)  U/L


 


Alkaline Phosphatase   97  ()  U/L


 


Total Protein   5.9 L  (6.4-8.2)  g/dL


 


Albumin   3.3 L  (3.4-5.0)  g/dL


 


Globulin   2.6  (2.3-3.5)  g/dL


 


Albumin/Globulin Ratio   1.3  (1.2-2.2)  











Result Diagrams: 


 04/13/20 15:39





 04/13/20 15:39





Sepsis Event Note





- Evaluation


Sepsis Screening Result: No Definite Risk





- Focused Exam


Vital Signs: 


 Vital Signs











  Temp Pulse Resp BP Pulse Ox


 


 04/13/20 16:05   95  15  151/99 H  96


 


 04/13/20 14:25  97.2 F  105 H  20  149/88 H  93 L











Date Exam was Performed: 04/13/20


Time Exam was Performed: 16:53





*Q Meaningful Use (ADM)





- VTE Risk Assess *Q


Each Risk Factor Represents 1 Point: Obesity ( BMI > 25 kg/m2), Abnormal 

Pulmonary Function (COPD)


Total Score 1 Point Risk Factors: 2


Each Risk Factor Represents 2 Points: Age 60 - 74 Years


Total Score 2 Point Risk Factors: 2


Each Risk Factor Represents 3 Points: None


Total Score 3 Point Risk Factors: 0


Each Risk Factor Represents 5 Points: None


Total Score 5 Point Risk Factors: 0


Venous Thromboembolism Risk Factor Score *Q: 4


Problem List Initiated/Reviewed/Updated: Yes


Orders Last 24hrs: 


 Active Orders 24 hr











 Category Date Time Status


 


 Patient Status Manage Transfer [TRANSFER] Routine ADT  04/13/20 16:48 Ordered


 


 RT Aerosol Therapy [RC] ASDIRECTED Care  04/13/20 14:49 Active


 


 Chest 2V [CR] Routine Exams  04/13/20 14:50 Taken


 


 Sodium Chloride 0.9% [Normal Saline] 1,000 ml Med  04/13/20 15:45 Active





 IV ASDIRECTED   


 


 Resuscitation Status Routine Resus Stat  04/13/20 16:49 Ordered








 Medication Orders





Sodium Chloride (Normal Saline)  1,000 mls @ 150 mls/hr IV ASDIRECTED DEREK


   Last Admin: 04/13/20 16:01  Dose: 150 mls/hr








Assessment/Plan Comment:: 





ASSESSMENT AND PLAN





COPD EXACERBATION-likely secondary to recent viral upper respiratory tract 

infection, no evidence of current bacterial infection.


-Supplemental oxygen as needed


-Nebulized albuterol and DuoNebs


-Solu-Medrol 40 mg IV every 6 hours





HYPOXIA-secondary to COPD exacerbation


-Management as above





MAINTENANCE ISSUES


-DVT prophylaxis; Lovenox 40 mg subcu daily


-GI prophylaxis; not indicated


-Merino catheter; not indicated


-Nutrition; regular diet


-Nicotine dependence; not required





CODE STATUS-FULL CODE





ADMISSION STATUS-this patient will be admitted to observation status, expect no 

more than a one night hospital stay for evaluation and management of problems 

as outlined above.





DISPOSITION-anticipate discharge to home after the hospital stay.





PRIMARY CARE PROVIDER-Dr. Gallegos





- Mortality Measure


Prognosis:: Good

## 2020-04-14 NOTE — CR
CHEST: 2 view

 

CLINICAL HISTORY:Dyspnea

 

COMPARISON:January 2020

 

FINDINGS:  The heart size, pulmonary vascularity and hilar structures are

normal. No infiltrate effusion or pneumothorax is seen. There are

atherosclerotic changes in the aorta. The lungs are hyperaerated.

 

IMPRESSION: No acute cardiopulmonary process.

 

Hyperaeration

## 2020-04-14 NOTE — PCM.PN
- General Info


Date of Service: 04/14/20


Subjective Update: 





Ms. Boinlla reports modest improvement in her shortness of breath, still 

becomes dyspneic with mild to moderate levels of exertion.  Denies dyspnea at 

rest and has not had significant cough.  Vital signs have remained stable and 

she has been afebrile.


Functional Status: Reports: Tolerating Diet, Urinating





- Review of Systems


General: Reports: Weakness.  Denies: Fever, Chills


Pulmonary: Reports: Shortness of Breath, Wheezing.  Denies: Cough, Sputum, 

Hemoptysis


Cardiovascular: Reports: Dyspnea on Exertion.  Denies: Chest Pain, Palpitations

, Orthopnea, PND, Edema


Gastrointestinal: Reports: No Symptoms





- Patient Data


Vitals - Most Recent: 


 Last Vital Signs











Temp  97.2 F   04/14/20 08:09


 


Pulse  88   04/14/20 10:51


 


Resp  16   04/14/20 08:09


 


BP  167/91 H  04/14/20 10:00


 


Pulse Ox  95   04/14/20 08:09











Weight - Most Recent: 177 lb 0.005 oz


I&O - Last 24 Hours: 


 Intake & Output











 04/13/20 04/14/20 04/14/20





 22:59 06:59 14:59


 


Intake Total 240  480


 


Balance 240  480











Lab Results Last 24 Hours: 


 Laboratory Results - last 24 hr











  04/13/20 04/13/20 Range/Units





  15:39 15:39 


 


WBC  9.2   (4.5-11.0)  K/uL


 


RBC  4.53   (3.30-5.50)  M/uL


 


Hgb  15.8 H   (12.0-15.0)  g/dL


 


Hct  46.2   (36.0-48.0)  %


 


MCV  102 H   (80-98)  fL


 


MCH  35 H   (27-31)  pg


 


MCHC  34   (32-36)  %


 


Plt Count  296   (150-400)  K/uL


 


Neut % (Auto)  36   (36-66)  %


 


Lymph % (Auto)  51 H   (24-44)  %


 


Mono % (Auto)  11 H   (2-6)  %


 


Eos % (Auto)  1 L   (2-4)  %


 


Baso % (Auto)  1   (0-1)  %


 


Sodium   142  (140-148)  mmol/L


 


Potassium   4.2  (3.6-5.2)  mmol/L


 


Chloride   104  (100-108)  mmol/L


 


Carbon Dioxide   27  (21-32)  mmol/L


 


Anion Gap   10.6  (5.0-14.0)  mmol/L


 


BUN   8  (7-18)  mg/dL


 


Creatinine   0.8  (0.6-1.0)  mg/dL


 


Est Cr Clr Drug Dosing   63.93  mL/min


 


Estimated GFR (MDRD)   > 60  (>60)  


 


Glucose   107 H  ()  mg/dL


 


Calcium   8.5  (8.5-10.1)  mg/dL


 


Total Bilirubin   0.4  D  (0.2-1.0)  mg/dL


 


AST   33  D  (15-37)  U/L


 


ALT   39  (12-78)  U/L


 


Alkaline Phosphatase   97  ()  U/L


 


Total Protein   5.9 L  (6.4-8.2)  g/dL


 


Albumin   3.3 L  (3.4-5.0)  g/dL


 


Globulin   2.6  (2.3-3.5)  g/dL


 


Albumin/Globulin Ratio   1.3  (1.2-2.2)  











Med Orders - Current: 


 Current Medications





Acetaminophen (Tylenol)  650 mg PO Q4H PRN


   PRN Reason: Pain (Mild 1-3)/fever


   Last Admin: 04/14/20 07:26 Dose:  650 mg


Albuterol (Proventil Neb Soln)  2.5 mg NEB Q4H PRN


   PRN Reason: Shortness Of Breath/wheezing


   Last Admin: 04/14/20 05:06 Dose:  2.5 mg


Albuterol/Ipratropium (Duoneb 3.0-0.5 Mg/3 Ml)  3 ml NEB QIDRT [facility]


   Last Admin: 04/14/20 10:51 Dose:  3 ml


Aspirin (Halfprin)  81 mg PO DAILY [facility]


   Last Admin: 04/14/20 09:54 Dose:  81 mg


Enoxaparin Sodium (Lovenox)  40 mg SUBCUT Q24H [facility]


   Last Admin: 04/13/20 18:07 Dose:  40 mg


Methylprednisolone Sodium Succinate (Solu-Medrol)  40 mg IVPUSH Q6H [facility]


   Last Admin: 04/14/20 10:51 Dose:  40 mg


Metoprolol Succinate (Toprol Xl)  50 mg PO BID [facility]


   Last Admin: 04/14/20 10:00 Dose:  Not Given


Non-Formulary Medication (Fluticasone/Vilanterol)  1 each IH DAILY [facility]


Ondansetron HCl (Zofran)  4 mg IV Q4H PRN


   PRN Reason: Nausea/Vomiting


Polyethylene Glycol (Miralax)  17 gm PO DAILY PRN


   PRN Reason: Constipation


Sodium Chloride (Saline Flush)  10 ml FLUSH ASDIRECTED PRN


   PRN Reason: Keep Vein Open





Discontinued Medications





Albuterol/Ipratropium (Duoneb 3.0-0.5 Mg/3 Ml)  3 ml NEB ONETIME ONE


   Stop: 04/13/20 14:50


   Last Admin: 04/13/20 14:56 Dose:  3 ml


Sodium Chloride (Normal Saline)  1,000 mls @ 150 mls/hr IV ASDIRECTED [facility]


   Last Admin: 04/13/20 16:01 Dose:  150 mls/hr


Methylprednisolone Sodium Succinate (Solu-Medrol)  125 mg IVPUSH ONETIME ONE


   Stop: 04/13/20 15:46


   Last Admin: 04/13/20 16:01 Dose:  125 mg











- Exam


General: Alert, Oriented, Cooperative, Mild Distress


Lungs: Decreased Breath Sounds, Wheezing.  No: Crackles, Rales, Rhonchi, Rub


Cardiovascular: Regular Rate, Regular Rhythm, No Murmurs


GI/Abdominal Exam: Soft, Non-Tender, No Organomegaly, No Distention


Extremities: Non-Tender, No Pedal Edema





Sepsis Event Note





- Evaluation


Sepsis Screening Result: No Definite Risk





- Focused Exam


Vital Signs: 


 Vital Signs











  Temp Pulse Pulse Resp BP BP Pulse Ox


 


 04/14/20 10:51    88    


 


 04/14/20 10:00   91    167/91 H  


 


 04/14/20 08:09  97.2 F   91  16   153/85 H  95


 


 04/14/20 02:38  95.1 F L   80  18   139/97 H  95











Date Exam was Performed: 04/14/20


Time Exam was Performed: 11:30





- Problem List Review


Problem List Initiated/Reviewed/Updated: Yes





- My Orders


Last 24 Hours: 


My Active Orders





04/13/20 16:49


Resuscitation Status Routine 





04/13/20 17:21


Patient Status [ADT] Routine 


Ambulate [RC] QID 


Height and Weight [RC] DAILY 


Intake and Output [RC] QSHIFT 


Notify Provider Vital Signs [RC] ASDIRECTED 


Oxygen Therapy [RC] PRN 


RT Aerosol Therapy [RC] ASDIRECTED 


Up With Assistance [RC] ASDIRECTED 


Up to Chair [RC] QID 


VTE/DVT Education [RC] Per Unit Routine 


Vital Signs [RC] Q4H 


Acetaminophen [Tylenol]   650 mg PO Q4H PRN 


Albuterol [Proventil Neb Soln]   2.5 mg NEB Q4H PRN 


Ondansetron [Zofran]   4 mg IV Q4H PRN 


Sodium Chloride 0.9% [Saline Flush]   10 ml FLUSH ASDIRECTED PRN 


polyethylene glycoL 3350 [MiraLAX]   17 gm PO DAILY PRN 


Saline Lock Insert [OM.PC] Routine 





04/13/20 18:00


Enoxaparin [Lovenox]   40 mg SUBCUT Q24H 





04/13/20 21:00


Albuterol/Ipratropium [DuoNeb 3.0-0.5 MG/3 ML]   3 ml NEB QIDRT 





04/13/20 22:00


methylPREDNISolone Sod Succ [Solu-MEDROL]   40 mg IVPUSH Q6H 





04/13/20 Lunch


Regular Diet [DIET] 





04/14/20 09:00


Aspirin [Halfprin]   81 mg PO DAILY 


Fluticasone/Vilanterol   1 each IH DAILY 


Metoprolol Succinate [Toprol XL]   50 mg PO BID 





04/14/20 11:30


methylPREDNISolone Sod Succ [Solu-MEDROL]   40 mg IVPUSH Q12H 














- Plan


Plan:: 





ASSESSMENT AND PLAN





COPD EXACERBATION-modest improvement from admission


-Supplemental oxygen as needed


-Nebulized albuterol and DuoNebs


-Solu-Medrol 40 mg IV every 12 hours





HYPOXIA-secondary to COPD exacerbation


-Management as above





MAINTENANCE ISSUES


-DVT prophylaxis; Lovenox 40 mg subcu daily


-GI prophylaxis; not indicated


-Merino catheter; not indicated


-Nutrition; regular diet


-Nicotine dependence; not required





CODE STATUS-FULL CODE





ADMISSION STATUS-this patient will be admitted to observation status, expect no 

more than a one night hospital stay for evaluation and management of problems 

as outlined above.





DISPOSITION-anticipate discharge to home after the hospital stay.





PRIMARY CARE PROVIDER-Dr. Gallegos

## 2020-04-15 NOTE — PCM.DCSUM1
**Discharge Summary





- Hospital Course


Brief History: Ms. Bonilla is a 64-year-old woman who was admitted through 

the emergency department with increased shortness of breath, secondary to COPD 

exacerbation.





- Discharge Data


Discharge Date: 04/15/20


Discharge Disposition: Home, Self-Care 01


Condition: Fair





- Referral to Home Health


Primary Care Physician: 


PCP None








- Discharge Diagnosis/Problem(s)


(1) COPD with exacerbation


SNOMED Code(s): 424089527


   ICD Code: J44.1 - CHRONIC OBSTRUCTIVE PULMONARY DISEASE W (ACUTE) 

EXACERBATION   Status: Acute   Current Visit: Yes   





(2) SOB (shortness of breath)


SNOMED Code(s): 188536742


   ICD Code: R06.02 - SHORTNESS OF BREATH   Status: Acute   Current Visit: No   





- Patient Summary/Data


Hospital Course: 





Ms. Bonilla is a 64-year-old woman who was admitted to [facility] status 

through the emergency department, for further management of COPD exacerbation 

and hypoxia.  Ms. Bonilla has a known history of COPD but does not use 

oxygen at home.  She reports that she is felt ill most of the winter with 

intermittent exacerbations of her COPD, managed with antibiotics and 

prednisone.  She just completed a course of antibiotic therapy with 

azithromycin and was feeling well until 2 days prior to admission when she 

began to develop increased shortness of breath associated with a dry and 

nonproductive cough.  Because of increased symptoms she presented to the 

emergency department and was noted to be hypoxic after fairly minimal activity.

  White blood cell count is within normal range and chest x-ray shows no 

significant infiltrates.  She denies recent fever, purulent sputum, or chest 

pain.  On admission she was treated with IV Solu-Medrol and nebulizer therapy.  

With no evidence of underlying bacterial infection she was not started on 

antibiotic therapy.  Over the next 2 days of hospitalization she experienced 

significant improvement in symptoms, with much less shortness of breath and 

cough by the time of discharge.  She will be discharged home with an additional 

4 days of oral prednisone and a follow-up appointment will be scheduled with 

her primary care provider within 1 week.  Activity will be as tolerated and she 

will resume her usual diet.





- Patient Instructions


Diet: Usual Diet as Tolerated


Activity: As Tolerated


Other/Special Instructions: Please schedule follow-up appointment with primary 

care provider within 1 week.





- Discharge Plan


*PRESCRIPTION DRUG MONITORING PROGRAM REVIEWED*: Not Applicable


*COPY OF PRESCRIPTION DRUG MONITORING REPORT IN PATIENT CIRILO: Not Applicable


Prescriptions/Med Rec: 


predniSONE [Prednisone] 20 mg PO DAILY #8 tablet


Home Medications: 


 Home Meds





Aspirin [Adult Low Dose Aspirin EC] 81 mg PO DAILY 09/10/13 [History]


Calcium Carb/Vitamin D3/Vit K1 [Calcium + D Soft Chewable Tab] 1 each PO DAILY 

09/10/13 [History]


Multivitamin [Multi-Vitamin Daily] 1 tab PO MOWEFR@0900 09/10/13 [History]


Metoprolol Succinate 50 mg PO BID 09/11/13 [History]


Albuterol/Ipratropium [Combivent] 1 puff INH QID 09/01/17 [History]


Aspirin/Caffeine [Percy Back-Body 500-32.5 mg] 2 tab PO BID PRN 12/04/18 [

History]


Albuterol [Proventil Neb Soln] 2.5 mg INH Q4H PRN 08/26/19 [History]


Fluticasone/Vilanterol [Breo Ellipta 100-25 MCG Inhalation Kit] 1 each IH DAILY 

08/26/19 [History]


Albuterol/Ipratropium [DuoNeb 3.0-0.5 MG/3 ML] 3 ml INH QID 04/13/20 [History]


predniSONE [Prednisone] 20 mg PO DAILY #8 tablet 04/15/20 [Rx]








Patient Handouts:  Steps to Quit Smoking, Easy-to-Read, Chronic Obstructive 

Pulmonary Disease, Easy-to-Read


Referrals: 


Cameron Gallegos MD [Physician] - 04/20/20 1:30 pm (Please arrive 15 minutes 

early to register for your appointment.)





- Discharge Summary/Plan Comment


DC Time >30 min.: No





- Patient Data


Vitals - Most Recent: 


 Last Vital Signs











Temp  96.3 F L  04/15/20 08:29


 


Pulse  96   04/15/20 08:31


 


Resp  18   04/15/20 08:29


 


BP  140/85   04/15/20 08:31


 


Pulse Ox  94 L  04/15/20 08:29











Weight - Most Recent: 177 lb 9.6 oz


I&O - Last 24 hours: 


 Intake & Output











 04/14/20 04/15/20 04/15/20





 22:59 06:59 14:59


 


Intake Total 670  


 


Balance 670  











Med Orders - Current: 


 Current Medications





Acetaminophen (Tylenol)  650 mg PO Q4H PRN


   PRN Reason: Pain (Mild 1-3)/fever


   Last Admin: 04/14/20 21:05 Dose:  650 mg


Albuterol (Proventil Neb Soln)  2.5 mg NEB Q4H PRN


   PRN Reason: Shortness Of Breath/wheezing


   Last Admin: 04/14/20 05:06 Dose:  2.5 mg


Albuterol/Ipratropium (Duoneb 3.0-0.5 Mg/3 Ml)  3 ml NEB QIDRT [facility]


   Last Admin: 04/15/20 07:55 Dose:  3 ml


Aspirin (Halfprin)  81 mg PO DAILY [facility]


   Last Admin: 04/15/20 08:30 Dose:  81 mg


Enoxaparin Sodium (Lovenox)  40 mg SUBCUT Q24H [facility]


   Last Admin: 04/14/20 17:32 Dose:  40 mg


Methylprednisolone Sodium Succinate (Solu-Medrol)  40 mg IVPUSH Q12H [facility]


   Last Admin: 04/14/20 21:11 Dose:  40 mg


Metoprolol Succinate (Toprol Xl)  50 mg PO BID [facility]


   Last Admin: 04/15/20 08:31 Dose:  50 mg


Ondansetron HCl (Zofran)  4 mg IV Q4H PRN


   PRN Reason: Nausea/Vomiting


Polyethylene Glycol (Miralax)  17 gm PO DAILY PRN


   PRN Reason: Constipation


Fluticasone/Salmeterol (Fluticasone-Salmeterol 113-14 Mcg Powder Inh)  1 puff 

INH BIDRT [facility]


   Last Admin: 04/15/20 07:56 Dose:  1 puff


Sodium Chloride (Saline Flush)  10 ml FLUSH ASDIRECTED PRN


   PRN Reason: Keep Vein Open





Discontinued Medications





Albuterol/Ipratropium (Duoneb 3.0-0.5 Mg/3 Ml)  3 ml NEB ONETIME ONE


   Stop: 04/13/20 14:50


   Last Admin: 04/13/20 14:56 Dose:  3 ml


Sodium Chloride (Normal Saline)  1,000 mls @ 150 mls/hr IV ASDIRECTED [facility]


   Last Admin: 04/13/20 16:01 Dose:  150 mls/hr


Methylprednisolone Sodium Succinate (Solu-Medrol)  125 mg IVPUSH ONETIME ONE


   Stop: 04/13/20 15:46


   Last Admin: 04/13/20 16:01 Dose:  125 mg


Methylprednisolone Sodium Succinate (Solu-Medrol)  40 mg IVPUSH Q6H [facility]


   Last Admin: 04/14/20 10:51 Dose:  40 mg











- Exam


General: Reports: Alert, Oriented, Cooperative, Mild Distress


Lungs: Reports: Decreased Breath Sounds, Wheezing.  Denies: Crackles, Rales, 

Rhonchi, Rub


Cardiovascular: Reports: Regular Rate, Regular Rhythm, No Murmurs


GI/Abdominal Exam: Soft, Non-Tender, No Organomegaly, No Distention


Extremities: Non-Tender, No Pedal Edema

## 2020-06-30 NOTE — CR
Pelvis 1V or 2V

 

CLINICAL HISTORY: Postop

 

FINDINGS: Patient has a new total right hip arthroplasty. Components appear well

seated. Patient also has a previous left hip arthroplasty.

 

IMPRESSION: Recent total right hip arthroplasty appears intact

## 2020-07-07 NOTE — EDM.PDOC
ED HPI GENERAL MEDICAL PROBLEM





- General


Chief Complaint: Lower Extremity Injury/Pain


Stated Complaint: FALL VIA NORTH


Time Seen by Provider: 07/07/20 17:36


Source of Information: Reports: Patient, RN Notes Reviewed


History Limitations: Reports: No Limitations





- History of Present Illness


INITIAL COMMENTS - FREE TEXT/NARRATIVE: 





65-year-old female presents emergency department via EMS services for right hip 

pain she is 1 week postop hip replacement unfortunately fell today landed 

directly on that hip





- Related Data


                                    Allergies











Allergy/AdvReac Type Severity Reaction Status Date / Time


 


nickel Allergy  Rash Verified 06/29/20 08:18











Home Meds: 


                                    Home Meds





Aspirin [Adult Low Dose Aspirin EC] 81 mg PO DAILY 09/10/13 [History]


Calcium Carb/Vitamin D3/Vit K1 [Calcium + D Soft Chewable Tab] 1 each PO DAILY 

09/10/13 [History]


Multivitamin [Multi-Vitamin Daily] 1 tab PO MOWEFR@0900 09/10/13 [History]


Metoprolol Succinate 50 mg PO BID 09/11/13 [History]


Albuterol/Ipratropium [Combivent] 1 puff INH QID 09/01/17 [History]


Aspirin/Caffeine [Percy Back-Body 500-32.5 mg] 2 tab PO BID PRN 12/04/18 

[History]


Albuterol [Proventil Neb Soln] 2.5 mg INH Q4H PRN 08/26/19 [History]


Fluticasone/Vilanterol [Breo Ellipta 100-25 MCG Inhalation Kit] 1 each IH DAILY 

08/26/19 [History]


Albuterol/Ipratropium [DuoNeb 3.0-0.5 MG/3 ML] 3 ml INH QID 04/13/20 [History]


oxyCODONE HCl/Acetaminophen [Percocet 5-325 mg Tablet] 1 - 2 each PO Q6HR PRN 

#40 tablet 07/01/20 [Rx]











Past Medical History


HEENT History: Reports: Hard of Hearing, Impaired Vision


Cardiovascular History: Reports: Afib, CAD, Hypertension, Other (See Below)


Other Cardiovascular History: ablation 2013


Respiratory History: Reports: COPD, Pneumonia, Recurrent, SOB


Gastrointestinal History: Reports: Other (See Below)


Other Gastrointestinal History: colitis


Genitourinary History: Reports: Other (See Below)


Other Genitourinary History: bladder infection x1


OB/GYN History: Reports: Pregnancy


Musculoskeletal History: Reports: Arthritis, Back Pain, Chronic


Other Musculoskeletal History: LAMINECTOMY 2017


Neurological History: Reports: Other (See Below)


Other Neuro History: abnormal vascular malformation repaired 2012


Psychiatric History: Reports: Depression


Endocrine/Metabolic History: Reports: None


Hematologic History: Reports: None


Immunologic History: Reports: None


Oncologic (Cancer) History: Reports: None


Dermatologic History: Reports: Eczema





- Infectious Disease History


Infectious Disease History: Reports: Chicken Pox, Measles





- Past Surgical History


Head Surgeries/Procedures: Reports: None


HEENT Surgical History: Reports: Tonsillectomy, Other (See Below)


Other HEENT Surgeries/Procedures: ear surgery


Cardiovascular Surgical History: Reports: Cardiac Ablation


Respiratory Surgical History: Reports: None


GI Surgical History: Reports: Colonoscopy


Female  Surgical History: Reports: Salpingo-Oophorectomy


Neurological Surgical History: Reports: Laminectomy


Musculoskeletal Surgical History: Reports: Hip Replacement


Other Musculoskeletal Surgeries/Procedures:: RT total hip 6/29/20.  LT ANA


Dermatological Surgical History: Reports: Skin Biopsy





Social & Family History





- Family History


Family Medical History: Noncontributory





- Tobacco Use


Smoking Status *Q: Former Smoker





- Caffeine Use


Caffeine Use: Reports: Coffee


Caffeine Use Comment: 2 cups/daily





- Living Situation & Occupation


Living situation: Reports: 


Occupation: Retired (lives with , retired, in [place], [place]. has three 

grown children)





Review of Systems





- Review of Systems


Review Of Systems: See Below


Musculoskeletal: Reports: Joint Pain (Right hip pain)





ED EXAM, GENERAL





- Physical Exam


Exam: See Below


Free Text/Narrative:: 





Examination the right hip the leg is internally rotated shortened and the the 

surgical wound is open


Exam Limited By: No Limitations


General Appearance: Alert, Mild Distress





Course





- Vital Signs


Last Recorded V/S: 





                                Last Vital Signs











Temp  96.5 F L  07/07/20 17:32


 


Pulse  103 H  07/07/20 17:32


 


Resp  18   07/07/20 17:32


 


BP  124/70   07/07/20 17:32


 


Pulse Ox  89 L  07/07/20 17:32














- Orders/Labs/Meds


Orders: 





                               Active Orders 24 hr











 Category Date Time Status


 


 Hip Min 1V Rt [CR] Stat Exams  07/07/20 17:33 Ordered


 


 CBC WITH AUTO DIFF [HEME] Stat Lab  07/07/20 17:53 Ordered


 


 COMPREHENSIVE METABOLIC PN,CMP [CHEM] Stat Lab  07/07/20 17:53 Ordered


 


 TYPE AND SCREEN [BBK] Stat Lab  07/07/20 17:57 Ordered














Departure





- Departure


Time of Disposition: 18:02


Disposition: Admitted As Inpatient 66


Condition: Fair


Clinical Impression: 


Hip fracture, right


Qualifiers:


 Encounter type: initial encounter Fracture type: open 








- Discharge Information


Referrals: 


PCP,None [Primary Care Provider] - 





Sepsis Event Note (ED)





- Focused Exam


Vital Signs: 





                                   Vital Signs











  Temp Pulse Resp BP Pulse Ox


 


 07/07/20 17:32  96.5 F L  103 H  18  124/70  89 L














- My Orders


Last 24 Hours: 





My Active Orders





07/07/20 17:33


Hip Min 1V Rt [CR] Stat 





07/07/20 17:53


CBC WITH AUTO DIFF [HEME] Stat 


COMPREHENSIVE METABOLIC PN,CMP [CHEM] Stat 





07/07/20 17:57


TYPE AND SCREEN [BBK] Stat 














- Assessment/Plan


Last 24 Hours: 





My Active Orders





07/07/20 17:33


Hip Min 1V Rt [CR] Stat 





07/07/20 17:53


CBC WITH AUTO DIFF [HEME] Stat 


COMPREHENSIVE METABOLIC PN,CMP [CHEM] Stat 





07/07/20 17:57


TYPE AND SCREEN [BBK] Stat 











Plan: 





Assessment





Acuity = acute





Site and laterality = fracture at the hardware site right hip





Etiology  = secondary to fall





Manifestations = none





Location of injury =  Home





Lab values = x-ray describes a fracture above





Plan


Call discussed case with Dr. Cruz orthopedic surgeon at 1743 he kindly 

agreed to come evaluate patient emergency department, plan is to take her to the

 operating room for further revision

















 This note was dictated using dragon voice recognition software please call with

 any questions on syntax or grammar.

## 2020-07-09 NOTE — [id number]
**Discharge Summary





- Hospital Course


HPI Initial Comments: 





65 year old female approximately 2 weeks S/P right total hip arthroplasty has a 

fall at home resulting in a periprosthetic fracture of the right femur.  

Presents to ED with unstable right femur and a portion of the hip incision 

traumatically dehisced. Anemia secondary to recent hip surgery and femur 

fracture.  N other injuries were sustained.


Diagnosis: Stroke: No


Modified Blount Scale: No Symptoms at All


Modified Blount Scale Score: 0





- Discharge Data


Discharge Date: 07/10/20


Discharge Disposition: DC/Tfer to SNF 03


Condition: Stable





- Referral to Home Health


Date of Face to Face Encounter: 07/09/20


Primary Care Physician: 


PCP None








- Discharge Diagnosis/Problem(s)


(1) Elle-prosthetic femur fracture at tip of prosthesis


SNOMED Code(s): 553207810


   ICD Code: M97.8XXA - PERIPROSTH FRACTURE AROUND OTHER INTERNAL PROSTH JOINT, 

INIT; Z96.649 - PRESENCE OF UNSPECIFIED ARTIFICIAL HIP JOINT   Status: Acute   

Current Visit: Yes   


Qualifiers: 


   Encounter type: subsequent encounter   Qualified Code(s): M97.8XXD - 

Periprosthetic fracture around other internal prosthetic joint, subsequent 

encounter; Z96.649 - Presence of unspecified artificial hip joint   





(2) Postoperative anemia


SNOMED Code(s): 241054765, 748061662


   ICD Code: D64.9 - ANEMIA, UNSPECIFIED   Status: Acute   Current Visit: Yes   





(3) S/P ORIF (open reduction internal fixation) fracture


SNOMED Code(s): 263323632


   ICD Code: Z98.890 - OTHER SPECIFIED POSTPROCEDURAL STATES; Z87.81 - PERSONAL 

HISTORY OF (HEALED) TRAUMATIC FRACTURE   Status: Acute   Current Visit: Yes   

Problem Details: Right Femur   





- Patient Summary/Data


Operative Procedure(s) Performed: Open Reduction and Internal Fixation of right 

femur


Complications: None


Consults: 


                                  Consultations





07/07/20 22:18


Consult to Case Management/ [CONS] Routine 


   Comment: 


   Physician Instructions: Discharge placement post hip surgery


   Service(s) to be Consulted: Case Management


PT Evaluation and Treatment [CONS] Routine 


   Please Evaluate and Treat.


   PT Reason for Consult: Ambulation


   Special Instructions: posterior hip precautions, TTWB


   This query below is only for informational purposes and is not editable.


   Admission Diagnosis/Problem: Hip fracture requiring operative repair


PT Evaluation and Treatment [CONS] Routine 


   Please Evaluate and Treat.


   PT Reason for Consult: Post op Ortho Surgery Hip


   Pending Discharge: Yes, 2- -3 days


   Special Instructions: Schedule first outpatient P.T. appointment 3 - 5 days 

post discharge


   This query below is only for informational purposes and is not editable.


   Admission Diagnosis/Problem: Hip fracture requiring operative repair











Hospital Course: 





Admitted through the ED and taken to the OR for ORIF of right femur.  Received 

two units PRBCs during surgery. Tolerated surgery well but had difficulty with 

pain control post op.  Started on Diluadid PCA POD #1 and was able to get pain 

under control.  Switched to po meds on POD #2 and was able to get up into a 

chair.  Has not been able to ambulate out of the room and requires assist of two

 for transfers. H/H was stable post op. Dressing was changed POD #2 and had mild

 sanguineous drainage. Merino D/C POD #2 and IV saline locked.  Arrangements made

 for transfer to SNF for rehab.  Follow up two weeks with Orthopedics.  Dilaudid

 2-4 mg po q 4 hours for pain and Lovenox 40 mg SQ q day for 28 days.  Continue 

home meds.  PT for transfers and ambulation TTWB on right and posterior total 

hip precautions.  





- Patient Instructions


Diet: Usual Diet as Tolerated


Activity: Apply Ice, As Tolerated, Cough & Deep Breathe


Driving: Do Not Drive


Showering/Bathing: May Shower in 3 Days


Wound/Incision Care: Keep Operative Site/Wound Site Clean and Dry


Notify Provider of: Fever, Increased Pain, Swelling and Redness, Drainage, 

Nausea and/or Vomiting





- Discharge Plan


*PRESCRIPTION DRUG MONITORING PROGRAM REVIEWED*: No


*COPY OF PRESCRIPTION DRUG MONITORING REPORT IN PATIENT CIRILO: No


Prescriptions/Med Rec: 


HYDROmorphone [Dilaudid] 2 - 4 mg PO Q4H PRN #56 tab


 PRN Reason: Pain (Moderate 4-6)


Enoxaparin Sodium [Lovenox] 40 mg SQ DAILY 28 Days #28 syringe


Home Medications: 


                                    Home Meds





Aspirin [Adult Low Dose Aspirin EC] 81 mg PO DAILY 09/10/13 [History]


Calcium Carb/Vitamin D3/Vit K1 [Calcium + D Soft Chewable Tab] 1 each PO DAILY 

09/10/13 [History]


Multivitamin [Multi-Vitamin Daily] 1 tab PO MOWEFR@0900 09/10/13 [History]


Metoprolol Succinate 50 mg PO BID 09/11/13 [History]


Albuterol/Ipratropium [Combivent] 1 puff INH QID 09/01/17 [History]


Aspirin/Caffeine [Percy Back-Body 500-32.5 mg] 2 tab PO BID PRN 12/04/18 

[History]


Albuterol [Proventil Neb Soln] 2.5 mg INH Q4H PRN 08/26/19 [History]


Fluticasone/Vilanterol [Breo Ellipta 100-25 MCG Inhalation Kit] 1 each IH DAILY 

08/26/19 [History]


Albuterol/Ipratropium [DuoNeb 3.0-0.5 MG/3 ML] 3 ml INH QID 04/13/20 [History]


oxyCODONE HCl/Acetaminophen [Percocet 5-325 mg Tablet] 1 - 2 each PO Q6HR PRN 

#40 tablet 07/01/20 [Rx]


Enoxaparin Sodium [Lovenox] 40 mg SQ DAILY 28 Days #28 syringe 07/09/20 [Rx]


HYDROmorphone [Dilaudid] 2 - 4 mg PO Q4H PRN #56 tab 07/09/20 [Rx]








Oxygen Therapy Mode: Nasal Cannula


Oxygen Flow Rate (L/min): 2


Maintain SpO2% greater than: 93


Forms:  ED Department Discharge


Referrals: 


PCP,None [Primary Care Provider] - 





- Discharge Summary/Plan Comment


DC Time >30 min.: Yes





- Patient Data


Vitals - Most Recent: 


                                Last Vital Signs











Temp  37.1 C   07/09/20 14:35


 


Pulse  98   07/09/20 14:35


 


Resp  16   07/09/20 14:35


 


BP  128/68   07/09/20 14:35


 


Pulse Ox  100   07/09/20 14:35











Weight - Most Recent: 85 kg


I&O - Last 24 hours: 


                                 Intake & Output











 07/09/20 07/09/20 07/09/20





 06:59 14:59 22:59


 


Intake Total 6640 252 6740


 


Output Total 675 800 250


 


Balance 552 -600 1120











Med Orders - Current: 


                               Current Medications





Hydrocodone Bitart/Acetaminophen (Norco 325-5 Mg)  2 tab PO Q4H PRN


   PRN Reason: Pain (mild 1-3)


   Last Admin: 07/08/20 09:14 Dose:  2 tab


   Documented by: 


Albuterol (Proventil Neb Soln)  2.5 mg INH Q4H PRN


   PRN Reason: Shortness of Breath


Albuterol/Ipratropium (Duoneb 3.0-0.5 Mg/3 Ml)  3 ml INH QIDRT [facility]


   Last Admin: 07/09/20 14:23 Dose:  3 ml


   Documented by: 


Bandage/Support Products ( Nasal )  1 applic NASBOTH BID [facility]


   Stop: 07/15/20 09:01


   Last Admin: 07/09/20 09:36 Dose:  1 applic


   Documented by: 


Diphenhydramine HCl (Benadryl)  25 mg IVPUSH Q6H PRN


   PRN Reason: Itching


Diphenhydramine HCl (Benadryl)  25 mg PO Q6H PRN


   PRN Reason: Itching


Docusate Sodium (Colace)  100 mg PO DAILY [facility]


   Last Admin: 07/09/20 09:36 Dose:  100 mg


   Documented by: 


Enoxaparin Sodium (Lovenox)  30 mg SUBCUT DAILY [facility]


   Last Admin: 07/09/20 09:36 Dose:  30 mg


   Documented by: 


Hydromorphone HCl (Dilaudid)  2 - 4 mg PO Q4H PRN


   PRN Reason: Pain (moderate 4-6)


   Last Admin: 07/09/20 12:55 Dose:  4 mg


   Documented by: 


Sodium Chloride (Normal Saline)  1,000 mls @ 125 mls/hr IV ASDIRECTED [facility]


   Last Admin: 07/09/20 14:11 Dose:  125 mls/hr


   Documented by: 


Magnesium Hydroxide (Milk Of Magnesia)  30 ml PO Q6H PRN


   PRN Reason: Stool Softener


Metoprolol Succinate (Toprol Xl)  50 mg PO BID [facility]


   Last Admin: 07/09/20 09:37 Dose:  50 mg


   Documented by: 


Morphine Sulfate (Morphine)  2 mg IVPUSH Q1H PRN


   PRN Reason: Breakthrough Pain


   Last Admin: 07/08/20 08:26 Dose:  2 mg


   Documented by: 


Naloxone HCl (Narcan)  0.04 mg IVPUSH Q3M PRN


   PRN Reason: Respiratory Depression


Ondansetron HCl (Zofran Odt)  4 mg PO Q6H PRN


   PRN Reason: Nausea/Vomiting


   Last Admin: 07/07/20 23:04 Dose:  4 mg


   Documented by: 


Ondansetron HCl (Zofran)  4 mg IVPUSH Q6H PRN


   PRN Reason: Nausea/Vomiting


Fluticasone/Salmeterol (Fluticasone-Salmeterol 113-14 Mcg Powder Inh)  0 puff 

INH BIDRT [facility]


   Last Admin: 07/09/20 07:55 Dose:  1 puff


   Documented by: 





Discontinued Medications





Albuterol/Ipratropium (Duoneb 3.0-0.5 Mg/3 Ml)  3 ml INH QID [facility]


   Last Admin: 07/08/20 07:01 Dose:  3 ml


   Documented by: 


Dexamethasone (Dexamethasone) Confirm Administered Dose 4 mg .ROUTE .STK-MED ONE


   Stop: 07/07/20 18:43


Fentanyl (Sublimaze)  100 mcg IVPUSH ONETIME ONE


   Stop: 07/07/20 18:08


   Last Admin: 07/07/20 18:18 Dose:  100 mcg


   Documented by: 


Fentanyl (Sublimaze) Confirm Administered Dose 250 mcg .ROUTE .STK-MED ONE


   Stop: 07/07/20 18:43


Glycopyrrolate (Robinul) Confirm Administered Dose 1 mg .ROUTE .STK-MED ONE


   Stop: 07/07/20 18:43


Hydromorphone HCl (Dilaudid)  2 mg IVPUSH ONETIME STA


   Stop: 07/07/20 23:46


   Last Admin: 07/07/20 23:51 Dose:  2 mg


   Documented by: 


Hydromorphone HCl (Dilaudid Pca 15 Mg In Ns 30 Ml)  0 mg IV ASDIRECTED [facility]; 

Protocol


   Last Admin: 07/08/20 13:31 Dose:  15 mg


   Documented by: 


Hydromorphone HCl (Dilaudid)  2 mg IVPUSH ONETIME ONE


   Stop: 07/08/20 10:49


   Last Admin: 07/08/20 11:01 Dose:  2 mg


   Documented by: 


Cefazolin Sodium/Dextrose 2 gm (/ Premix)  50 mls @ 100 mls/hr IV ONETIME ONE


   Stop: 07/07/20 19:11


   Last Admin: 07/07/20 18:57 Dose:  100 mls/hr


   Documented by: 


Sodium Chloride (Normal Saline) Confirm Administered Dose 10 mls @ as directed 

.ROUTE .STK-MED ONE


   Stop: 07/07/20 20:23


Cefazolin Sodium 1 gm/ Sodium (Chloride)  50 mls @ 200 mls/hr IV Q8H [facility]


   Stop: 07/09/20 03:14


   Last Admin: 07/08/20 03:10 Dose:  200 mls/hr


   Documented by: 


Cefazolin Sodium/Dextrose 1 gm (/ Premix)  50 mls @ 200 mls/hr IV Q8H [facility]


   Stop: 07/09/20 03:14


   Last Admin: 07/09/20 02:04 Dose:  200 mls/hr


   Documented by: 


Lorazepam (Ativan)  1 mg IVPUSH ONETIME ONE


   Stop: 07/07/20 18:08


   Last Admin: 07/07/20 18:23 Dose:  1 mg


   Documented by: 


Neostigmine Methylsulfate (Neostigmine) Confirm Administered Dose 5 mg .ROUTE 

.STK-MED ONE


   Stop: 07/07/20 18:43


Ondansetron HCl (Zofran) Confirm Administered Dose 4 mg .ROUTE .STK-MED ONE


   Stop: 07/07/20 18:43


Povidone Iodine (Betadine 10% Soln) Confirm Administered Dose 1 ml .ROUTE .STK-

MED ONE


   Stop: 07/07/20 18:47


   Last Admin: 07/07/20 21:50 Dose:  1 ml


   Documented by: 


Propofol (Diprivan  20 Ml) Confirm Administered Dose 200 mg .ROUTE .STK-MED ONE


   Stop: 07/07/20 18:43


Rocuronium Bromide (Zemuron) Confirm Administered Dose 50 mg .ROUTE .STK-MED ONE


   Stop: 07/07/20 18:43


Succinylcholine Chloride (Quelicin) Confirm Administered Dose 200 mg .ROUTE 

.STK-MED ONE


   Stop: 07/07/20 18:43

## 2020-07-09 NOTE — PCM.[id number]
- General Info


Date of Service: 07/09/20


Date of Surgery/Procedure: 07/07/20


POD#: 2


Functional Status: Reports: Pain Controlled, Tolerating Diet





- Review of Systems


General: Reports: No Symptoms


HEENT: Reports: No Symptoms


Pulmonary: Reports: Cough, Sputum


Cardiovascular: Reports: No Symptoms


Gastrointestinal: Reports: No Symptoms


Genitourinary: Reports: No Symptoms


Musculoskeletal: Reports: Leg Pain


Skin: Reports: No Symptoms


Neurological: Reports: No Symptoms


Psychiatric: Reports: No Symptoms





- Patient Data


Vitals - Most Recent: 


                                Last Vital Signs











Temp  37.1 C   07/09/20 14:35


 


Pulse  98   07/09/20 14:35


 


Resp  16   07/09/20 14:35


 


BP  128/68   07/09/20 14:35


 


Pulse Ox  100   07/09/20 14:35











Weight - Most Recent: 85 kg


I&O - Last 24 Hours: 


                                 Intake & Output











 07/09/20 07/09/20 07/09/20





 06:59 14:59 22:59


 


Intake Total 1227 200 


 


Output Total 675 800 


 


Balance 552 -600 











Med Orders - Current: 


                               Current Medications





Hydrocodone Bitart/Acetaminophen (Norco 325-5 Mg)  2 tab PO Q4H PRN


   PRN Reason: Pain (mild 1-3)


   Last Admin: 07/08/20 09:14 Dose:  2 tab


   Documented by: 


Albuterol (Proventil Neb Soln)  2.5 mg INH Q4H PRN


   PRN Reason: Shortness of Breath


Albuterol/Ipratropium (Duoneb 3.0-0.5 Mg/3 Ml)  3 ml INH QIDRT [facility]


   Last Admin: 07/09/20 14:23 Dose:  3 ml


   Documented by: 


Bandage/Support Products ( Nasal )  1 applic NASBOTH BID [facility]


   Stop: 07/15/20 09:01


   Last Admin: 07/09/20 09:36 Dose:  1 applic


   Documented by: 


Diphenhydramine HCl (Benadryl)  25 mg IVPUSH Q6H PRN


   PRN Reason: Itching


Diphenhydramine HCl (Benadryl)  25 mg PO Q6H PRN


   PRN Reason: Itching


Docusate Sodium (Colace)  100 mg PO DAILY [facility]


   Last Admin: 07/09/20 09:36 Dose:  100 mg


   Documented by: 


Enoxaparin Sodium (Lovenox)  30 mg SUBCUT DAILY [facility]


   Last Admin: 07/09/20 09:36 Dose:  30 mg


   Documented by: 


Hydromorphone HCl (Dilaudid)  2 - 4 mg PO Q4H PRN


   PRN Reason: Pain (moderate 4-6)


   Last Admin: 07/09/20 12:55 Dose:  4 mg


   Documented by: 


Sodium Chloride (Normal Saline)  1,000 mls @ 125 mls/hr IV ASDIRECTED [facility]


   Last Admin: 07/09/20 14:11 Dose:  125 mls/hr


   Documented by: 


Magnesium Hydroxide (Milk Of Magnesia)  30 ml PO Q6H PRN


   PRN Reason: Stool Softener


Metoprolol Succinate (Toprol Xl)  50 mg PO BID [facility]


   Last Admin: 07/09/20 09:37 Dose:  50 mg


   Documented by: 


Morphine Sulfate (Morphine)  2 mg IVPUSH Q1H PRN


   PRN Reason: Breakthrough Pain


   Last Admin: 07/08/20 08:26 Dose:  2 mg


   Documented by: 


Naloxone HCl (Narcan)  0.04 mg IVPUSH Q3M PRN


   PRN Reason: Respiratory Depression


Ondansetron HCl (Zofran Odt)  4 mg PO Q6H PRN


   PRN Reason: Nausea/Vomiting


   Last Admin: 07/07/20 23:04 Dose:  4 mg


   Documented by: 


Ondansetron HCl (Zofran)  4 mg IVPUSH Q6H PRN


   PRN Reason: Nausea/Vomiting


Fluticasone/Salmeterol (Fluticasone-Salmeterol 113-14 Mcg Powder Inh)  0 puff 

INH BIDRT [facility]


   Last Admin: 07/09/20 07:55 Dose:  1 puff


   Documented by: 





Discontinued Medications





Albuterol/Ipratropium (Duoneb 3.0-0.5 Mg/3 Ml)  3 ml INH QID [facility]


   Last Admin: 07/08/20 07:01 Dose:  3 ml


   Documented by: 


Dexamethasone (Dexamethasone) Confirm Administered Dose 4 mg .ROUTE .STK-MED ONE


   Stop: 07/07/20 18:43


Fentanyl (Sublimaze)  100 mcg IVPUSH ONETIME ONE


   Stop: 07/07/20 18:08


   Last Admin: 07/07/20 18:18 Dose:  100 mcg


   Documented by: 


Fentanyl (Sublimaze) Confirm Administered Dose 250 mcg .ROUTE .STK-MED ONE


   Stop: 07/07/20 18:43


Glycopyrrolate (Robinul) Confirm Administered Dose 1 mg .ROUTE .STK-MED ONE


   Stop: 07/07/20 18:43


Hydromorphone HCl (Dilaudid)  2 mg IVPUSH ONETIME STA


   Stop: 07/07/20 23:46


   Last Admin: 07/07/20 23:51 Dose:  2 mg


   Documented by: 


Hydromorphone HCl (Dilaudid Pca 15 Mg In Ns 30 Ml)  0 mg IV ASDIRECTED [facility]; 

Protocol


   Last Admin: 07/08/20 13:31 Dose:  15 mg


   Documented by: 


Hydromorphone HCl (Dilaudid)  2 mg IVPUSH ONETIME ONE


   Stop: 07/08/20 10:49


   Last Admin: 07/08/20 11:01 Dose:  2 mg


   Documented by: 


Cefazolin Sodium/Dextrose 2 gm (/ Premix)  50 mls @ 100 mls/hr IV ONETIME ONE


   Stop: 07/07/20 19:11


   Last Admin: 07/07/20 18:57 Dose:  100 mls/hr


   Documented by: 


Sodium Chloride (Normal Saline) Confirm Administered Dose 10 mls @ as directed 

.ROUTE .STK-MED ONE


   Stop: 07/07/20 20:23


Cefazolin Sodium 1 gm/ Sodium (Chloride)  50 mls @ 200 mls/hr IV Q8H [facility]


   Stop: 07/09/20 03:14


   Last Admin: 07/08/20 03:10 Dose:  200 mls/hr


   Documented by: 


Cefazolin Sodium/Dextrose 1 gm (/ Premix)  50 mls @ 200 mls/hr IV Q8H [facility]


   Stop: 07/09/20 03:14


   Last Admin: 07/09/20 02:04 Dose:  200 mls/hr


   Documented by: 


Lorazepam (Ativan)  1 mg IVPUSH ONETIME ONE


   Stop: 07/07/20 18:08


   Last Admin: 07/07/20 18:23 Dose:  1 mg


   Documented by: 


Neostigmine Methylsulfate (Neostigmine) Confirm Administered Dose 5 mg .ROUTE 

.STK-MED ONE


   Stop: 07/07/20 18:43


Ondansetron HCl (Zofran) Confirm Administered Dose 4 mg .ROUTE .STK-MED ONE


   Stop: 07/07/20 18:43


Povidone Iodine (Betadine 10% Soln) Confirm Administered Dose 1 ml .ROUTE .STK-

MED ONE


   Stop: 07/07/20 18:47


   Last Admin: 07/07/20 21:50 Dose:  1 ml


   Documented by: 


Propofol (Diprivan  20 Ml) Confirm Administered Dose 200 mg .ROUTE .STK-MED ONE


   Stop: 07/07/20 18:43


Rocuronium Bromide (Zemuron) Confirm Administered Dose 50 mg .ROUTE .STK-MED ONE


   Stop: 07/07/20 18:43


Succinylcholine Chloride (Quelicin) Confirm Administered Dose 200 mg .ROUTE 

.STK-MED ONE


   Stop: 07/07/20 18:43











- Exam


Wound/Incisions: Healing Well, No Drainage, Other (incision looks good)


Quality Assessment: Supplemental Oxygen


General: Alert, Oriented


HEENT: Pupils Equal


Neck: Supple


Lungs: Clear to Auscultation, Normal Respiratory Effort


Cardiovascular: Regular Rate, Regular Rhythm


GI/Abdominal Exam: Normal Bowel Sounds, Soft, Non-Tender, No Distention


Extremities: Limited Range of Motion, Other (moderate swelling)


Skin: Warm, Dry


Neurological: No New Focal Deficit


Psy/Mental Status: Alert, Normal Affect, Normal Mood





Sepsis Event Note





- Evaluation


Sepsis Screening Result: Sepsis Risk





- Focused Exam


Vital Signs: 


                                   Vital Signs











  Temp Pulse Pulse Resp BP BP Pulse Ox


 


 07/09/20 14:35  37.1 C   98  16   128/68  100


 


 07/09/20 14:23    100    


 


 07/09/20 13:24        96


 


 07/09/20 10:52    107 H    


 


 07/09/20 10:46  36.9 C   112 H  16   113/53 L  99


 


 07/09/20 09:37   102 H    143/45 H  


 


 07/09/20 07:56    102 H    


 


 07/09/20 07:20        94 L


 


 07/09/20 07:00  37.0 C   99  16   143/45 H  94 L














  Pulse Ox


 


 07/09/20 14:35 


 


 07/09/20 14:23  95


 


 07/09/20 13:24 


 


 07/09/20 10:52  95


 


 07/09/20 10:46 


 


 07/09/20 09:37 


 


 07/09/20 07:56  95


 


 07/09/20 07:20 


 


 07/09/20 07:00 











Date Exam was Performed: 07/09/20


Time Exam was Performed: 16:19





- Problem List & Annotations


(1) Elle-prosthetic femur fracture at tip of prosthesis


SNOMED Code(s): 582591074


   Code(s): M97.8XXA - PERIPROSTH FRACTURE AROUND OTHER INTERNAL PROSTH JOINT, 

INIT; Z96.649 - PRESENCE OF UNSPECIFIED ARTIFICIAL HIP JOINT   Status: Acute   

Current Visit: Yes   


Qualifiers: 


   Encounter type: subsequent encounter   Qualified Code(s): M97.8XXD - 

Periprosthetic fracture around other internal prosthetic joint, subsequent 

encounter; Z96.649 - Presence of unspecified artificial hip joint   





(2) Postoperative anemia


SNOMED Code(s): 672681435, 425404256


   Code(s): D64.9 - ANEMIA, UNSPECIFIED   Status: Acute   Current Visit: Yes   





(3) S/P ORIF (open reduction internal fixation) fracture


SNOMED Code(s): 312198522


   Code(s): Z98.890 - OTHER SPECIFIED POSTPROCEDURAL STATES; Z87.81 - PERSONAL 

HISTORY OF (HEALED) TRAUMATIC FRACTURE   Status: Acute   Current Visit: Yes   

Annotation/Comment:: Right Femur   





- Problem List Review


Problem List Initiated/Reviewed/Updated: Yes





- My Orders


Last 24 Hours: 


                               Active Orders 24 hr











 Category Date Time Status


 


 Urinary Catheter Removal [RC] PER UNIT ROUTINE [facility]  07/09/20 15:54 Active


 


 Convert IV to Saline Lock [OM.PC] Routine Oth  07/09/20 15:54 Ordered








                                Medication Orders





Hydrocodone Bitart/Acetaminophen (Norco 325-5 Mg)  2 tab PO Q4H PRN


   PRN Reason: Pain (mild 1-3)


   Last Admin: 07/08/20 09:14  Dose: 2 tab


   Documented by: LAKSHMI


Albuterol (Proventil Neb Soln)  2.5 mg INH Q4H PRN


   PRN Reason: Shortness of Breath


Albuterol/Ipratropium (Duoneb 3.0-0.5 Mg/3 Ml)  3 ml INH QIDRT [facility]


   Last Admin: 07/09/20 14:23  Dose: 3 ml


   Documented by: HUMA


   Admin: 07/09/20 10:52  Dose: 3 ml


   Documented by: HUMA


   Admin: 07/09/20 07:52  Dose: 3 ml


   Documented by: HUMA


   Admin: 07/08/20 21:09  Dose: 3 ml


   Documented by: ARON


   Admin: 07/08/20 14:19  Dose: 3 ml


   Documented by: SHARON


   Admin: 07/08/20 10:32  Dose: 3 ml


   Documented by: SHARON


Bandage/Support Products ( Nasal )  1 applic NASBOTH BID [facility]


   Stop: 07/15/20 09:01


   Last Admin: 07/09/20 09:36  Dose: 1 applic


   Documented by: KELSEY


   Admin: 07/08/20 21:04  Dose: 1 applic


   Documented by: ARON


   Admin: 07/08/20 08:53  Dose: 1 puff


   Documented by: SHARON


Diphenhydramine HCl (Benadryl)  25 mg IVPUSH Q6H PRN


   PRN Reason: Itching


Diphenhydramine HCl (Benadryl)  25 mg PO Q6H PRN


   PRN Reason: Itching


Docusate Sodium (Colace)  100 mg PO DAILY [facility]


   Last Admin: 07/09/20 09:36  Dose: 100 mg


   Documented by: KELSEY


   Admin: 07/08/20 08:45  Dose: 100 mg


   Documented by: LAKSHMI


Enoxaparin Sodium (Lovenox)  30 mg SUBCUT DAILY [facility]


   Last Admin: 07/09/20 09:36  Dose: 30 mg


   Documented by: KELSEY


   Admin: 07/08/20 08:45  Dose: 30 mg


   Documented by: LAKSHMI


Hydromorphone HCl (Dilaudid)  2 - 4 mg PO Q4H PRN


   PRN Reason: Pain (moderate 4-6)


   Last Admin: 07/09/20 12:55  Dose: 4 mg


   Documented by: KELSEY


   Admin: 07/08/20 07:09  Dose: 4 mg


   Documented by: SHERRILL


   Admin: 07/08/20 03:04  Dose: 4 mg


   Documented by: SHERRILL


Sodium Chloride (Normal Saline)  1,000 mls @ 125 mls/hr IV ASDIRECTED [facility]


   Last Admin: 07/09/20 14:11  Dose: 125 mls/hr


   Documented by: KELSEY


   Infusion: 07/09/20 10:02  Dose: 125 mls/hr


   Documented by: KELSEY


   Admin: 07/09/20 02:02  Dose: 125 mls/hr


   Documented by: ARON


   Infusion: 07/09/20 01:32  Dose: 125 mls/hr


   Documented by: ARON


   Admin: 07/08/20 17:32  Dose: 125 mls/hr


   Documented by: ALEX


   Infusion: 07/08/20 16:44  Dose: 125 mls/hr


   Documented by: ALEX


   Admin: 07/08/20 08:44  Dose: 125 mls/hr


   Documented by: LAKSHMI


Magnesium Hydroxide (Milk Of Magnesia)  30 ml PO Q6H PRN


   PRN Reason: Stool Softener


Metoprolol Succinate (Toprol Xl)  50 mg PO BID [facility]


   Last Admin: 07/09/20 09:37  Dose: 50 mg


   Documented by: KELSEY


   Admin: 07/08/20 21:03  Dose: 50 mg


   Documented by: ARON


   Admin: 07/08/20 08:46  Dose: 50 mg


   Documented by: LAKSHMI


Morphine Sulfate (Morphine)  2 mg IVPUSH Q1H PRN


   PRN Reason: Breakthrough Pain


   Last Admin: 07/08/20 08:26  Dose: 2 mg


   Documented by: LAKSHMI


   Admin: 07/08/20 05:43  Dose: 2 mg


   Documented by: SHERRILL


   Admin: 07/07/20 23:03  Dose: 2 mg


   Documented by: SHERRILL


Naloxone HCl (Narcan)  0.04 mg IVPUSH Q3M PRN


   PRN Reason: Respiratory Depression


Ondansetron HCl (Zofran Odt)  4 mg PO Q6H PRN


   PRN Reason: Nausea/Vomiting


   Last Admin: 07/07/20 23:04  Dose: 4 mg


   Documented by: SHERRILL


Ondansetron HCl (Zofran)  4 mg IVPUSH Q6H PRN


   PRN Reason: Nausea/Vomiting


Fluticasone/Salmeterol (Fluticasone-Salmeterol 113-14 Mcg Powder Inh)  0 puff 

INH BIDRT [facility]


   Last Admin: 07/09/20 07:55  Dose: 1 puff


   Documented by: HUMA


   Admin: 07/08/20 21:05  Dose: 1 puff


   Documented by: ARON


   Admin: 07/08/20 10:32  Dose: 1 puff


   Documented by: SHARON











- Assessment


Assessment           (Free Text/Narrative):: 





Pain much better controlled today, was able to be up in chair, requires assist 

of two for transfers, dressing changed and incision looks very good, working on 

ankle pumps in bed, does have a mild productive cough which she had prior to 

admission





- Plan


Plan                        (Free Text/Narrative):: 





Transitioned off PCA to po meds and tolerated well, D/C [place], saline lock IV, 

continue PT, bed is available at [facility] tomorrow, barring some adverse event 

tonight she will be able to transfer

## 2020-07-09 NOTE — PCM.SURGPN
- General Info


Date of Service: 07/08/20


Date of Surgery/Procedure: 07/07/20


POD#: 1


Post-Op Diagnosis: Periprosthetic femur fracture


Functional Status: Reports: Other (Pain not well controlled, poor appetite)





- Review of Systems


General: Reports: No Symptoms


HEENT: Reports: No Symptoms


Pulmonary: Reports: Cough, Sputum


Cardiovascular: Reports: No Symptoms


Gastrointestinal: Reports: No Symptoms


Genitourinary: Reports: No Symptoms


Musculoskeletal: Reports: Leg Pain


Skin: Reports: No Symptoms


Neurological: Reports: No Symptoms


Psychiatric: Reports: No Symptoms





- Patient Data


Vitals - Most Recent: 


                                Last Vital Signs











Temp  36.9 C   07/09/20 10:46


 


Pulse  107 H  07/09/20 10:52


 


Resp  16   07/09/20 10:46


 


BP  113/53 L  07/09/20 10:46


 


Pulse Ox  95   07/09/20 10:52











Weight - Most Recent: 85 kg


I&O - Last 24 Hours: 


                                 Intake & Output











 07/08/20 07/09/20 07/09/20





 22:59 06:59 14:59


 


Intake Total 1664 1227 200


 


Output Total 650 675 


 


Balance 1014 552 200











Med Orders - Current: 


                               Current Medications





Hydrocodone Bitart/Acetaminophen (Norco 325-5 Mg)  2 tab PO Q4H PRN


   PRN Reason: Pain (mild 1-3)


   Last Admin: 07/08/20 09:14 Dose:  2 tab


   Documented by: 


Albuterol (Proventil Neb Soln)  2.5 mg INH Q4H PRN


   PRN Reason: Shortness of Breath


Albuterol/Ipratropium (Duoneb 3.0-0.5 Mg/3 Ml)  3 ml INH QIDRT [facility]


   Last Admin: 07/09/20 10:52 Dose:  3 ml


   Documented by: 


Bandage/Support Products ( Nasal )  1 applic NASBOTH BID [facility]


   Stop: 07/15/20 09:01


   Last Admin: 07/09/20 09:36 Dose:  1 applic


   Documented by: 


Diphenhydramine HCl (Benadryl)  25 mg IVPUSH Q6H PRN


   PRN Reason: Itching


Diphenhydramine HCl (Benadryl)  25 mg PO Q6H PRN


   PRN Reason: Itching


Docusate Sodium (Colace)  100 mg PO DAILY [facility]


   Last Admin: 07/09/20 09:36 Dose:  100 mg


   Documented by: 


Enoxaparin Sodium (Lovenox)  30 mg SUBCUT DAILY [facility]


   Last Admin: 07/09/20 09:36 Dose:  30 mg


   Documented by: 


Hydromorphone HCl (Dilaudid)  2 - 4 mg PO Q4H PRN


   PRN Reason: Pain (moderate 4-6)


   Last Admin: 07/09/20 12:55 Dose:  4 mg


   Documented by: 


Sodium Chloride (Normal Saline)  1,000 mls @ 125 mls/hr IV ASDIRECTED [facility]


   Last Admin: 07/09/20 02:02 Dose:  125 mls/hr


   Documented by: 


Magnesium Hydroxide (Milk Of Magnesia)  30 ml PO Q6H PRN


   PRN Reason: Stool Softener


Metoprolol Succinate (Toprol Xl)  50 mg PO BID [facility]


   Last Admin: 07/09/20 09:37 Dose:  50 mg


   Documented by: 


Morphine Sulfate (Morphine)  2 mg IVPUSH Q1H PRN


   PRN Reason: Breakthrough Pain


   Last Admin: 07/08/20 08:26 Dose:  2 mg


   Documented by: 


Naloxone HCl (Narcan)  0.04 mg IVPUSH Q3M PRN


   PRN Reason: Respiratory Depression


Ondansetron HCl (Zofran Odt)  4 mg PO Q6H PRN


   PRN Reason: Nausea/Vomiting


   Last Admin: 07/07/20 23:04 Dose:  4 mg


   Documented by: 


Ondansetron HCl (Zofran)  4 mg IVPUSH Q6H PRN


   PRN Reason: Nausea/Vomiting


Fluticasone/Salmeterol (Fluticasone-Salmeterol 113-14 Mcg Powder Inh)  0 puff 

INH BIDRT [facility]


   Last Admin: 07/09/20 07:55 Dose:  1 puff


   Documented by: 





Discontinued Medications





Albuterol/Ipratropium (Duoneb 3.0-0.5 Mg/3 Ml)  3 ml INH QID [facility]


   Last Admin: 07/08/20 07:01 Dose:  3 ml


   Documented by: 


Dexamethasone (Dexamethasone) Confirm Administered Dose 4 mg .ROUTE .STK-MED ONE


   Stop: 07/07/20 18:43


Fentanyl (Sublimaze)  100 mcg IVPUSH ONETIME ONE


   Stop: 07/07/20 18:08


   Last Admin: 07/07/20 18:18 Dose:  100 mcg


   Documented by: 


Fentanyl (Sublimaze) Confirm Administered Dose 250 mcg .ROUTE .STK-MED ONE


   Stop: 07/07/20 18:43


Glycopyrrolate (Robinul) Confirm Administered Dose 1 mg .ROUTE .STK-MED ONE


   Stop: 07/07/20 18:43


Hydromorphone HCl (Dilaudid)  2 mg IVPUSH ONETIME STA


   Stop: 07/07/20 23:46


   Last Admin: 07/07/20 23:51 Dose:  2 mg


   Documented by: 


Hydromorphone HCl (Dilaudid Pca 15 Mg In Ns 30 Ml)  0 mg IV ASDIRECTED [facility]; 

Protocol


   Last Admin: 07/08/20 13:31 Dose:  15 mg


   Documented by: 


Hydromorphone HCl (Dilaudid)  2 mg IVPUSH ONETIME ONE


   Stop: 07/08/20 10:49


   Last Admin: 07/08/20 11:01 Dose:  2 mg


   Documented by: 


Cefazolin Sodium/Dextrose 2 gm (/ Premix)  50 mls @ 100 mls/hr IV ONETIME ONE


   Stop: 07/07/20 19:11


   Last Admin: 07/07/20 18:57 Dose:  100 mls/hr


   Documented by: 


Sodium Chloride (Normal Saline) Confirm Administered Dose 10 mls @ as directed 

.ROUTE .STK-MED ONE


   Stop: 07/07/20 20:23


Cefazolin Sodium 1 gm/ Sodium (Chloride)  50 mls @ 200 mls/hr IV Q8H [facility]


   Stop: 07/09/20 03:14


   Last Admin: 07/08/20 03:10 Dose:  200 mls/hr


   Documented by: 


Cefazolin Sodium/Dextrose 1 gm (/ Premix)  50 mls @ 200 mls/hr IV Q8H [facility]


   Stop: 07/09/20 03:14


   Last Admin: 07/09/20 02:04 Dose:  200 mls/hr


   Documented by: 


Lorazepam (Ativan)  1 mg IVPUSH ONETIME ONE


   Stop: 07/07/20 18:08


   Last Admin: 07/07/20 18:23 Dose:  1 mg


   Documented by: 


Neostigmine Methylsulfate (Neostigmine) Confirm Administered Dose 5 mg .ROUTE 

.STK-MED ONE


   Stop: 07/07/20 18:43


Ondansetron HCl (Zofran) Confirm Administered Dose 4 mg .ROUTE .STK-MED ONE


   Stop: 07/07/20 18:43


Povidone Iodine (Betadine 10% Soln) Confirm Administered Dose 1 ml .ROUTE .STK-

MED ONE


   Stop: 07/07/20 18:47


   Last Admin: 07/07/20 21:50 Dose:  1 ml


   Documented by: 


Propofol (Diprivan  20 Ml) Confirm Administered Dose 200 mg .ROUTE .STK-MED ONE


   Stop: 07/07/20 18:43


Rocuronium Bromide (Zemuron) Confirm Administered Dose 50 mg .ROUTE .STK-MED ONE


   Stop: 07/07/20 18:43


Succinylcholine Chloride (Quelicin) Confirm Administered Dose 200 mg .ROUTE 

.STK-MED ONE


   Stop: 07/07/20 18:43











- Exam


Wound/Incisions: Dressing Dry and Intact


General: Alert, Oriented


HEENT: Pupils Equal


Neck: Supple


Lungs: Decreased Breath Sounds


Cardiovascular: Regular Rate, Regular Rhythm


GI/Abdominal Exam: Normal Bowel Sounds, Soft, Non-Tender, No Distention


Extremities: Limited Range of Motion, Other (moderate swelling)


Skin: Warm, Dry, Intact


Neurological: No New Focal Deficit


Psy/Mental Status: Alert, Normal Affect, Normal Mood





Sepsis Event Note





- Evaluation


Sepsis Screening Result: Sepsis Risk





- Focused Exam


Vital Signs: 


                                   Vital Signs











  Temp Pulse Pulse Resp BP BP Pulse Ox


 


 07/09/20 10:52    107 H    


 


 07/09/20 10:46  36.9 C   112 H  16   113/53 L  99


 


 07/09/20 09:37   102 H    143/45 H  


 


 07/09/20 07:56    102 H    


 


 07/09/20 07:20        94 L


 


 07/09/20 07:00  37.0 C   99  16   143/45 H  94 L


 


 07/09/20 03:31  36.8 C   97  16   122/58 L  95














  Pulse Ox


 


 07/09/20 10:52  95


 


 07/09/20 10:46 


 


 07/09/20 09:37 


 


 07/09/20 07:56  95


 


 07/09/20 07:20 


 


 07/09/20 07:00 


 


 07/09/20 03:31 











Date Exam was Performed: 07/09/20


Time Exam was Performed: 13:06





- Problem List & Annotations


(1) Elle-prosthetic femur fracture at tip of prosthesis


SNOMED Code(s): 000570210


   Code(s): M97.8XXA - PERIPROSTH FRACTURE AROUND OTHER INTERNAL PROSTH JOINT, 

INIT; Z96.649 - PRESENCE OF UNSPECIFIED ARTIFICIAL HIP JOINT   Status: Acute   

Current Visit: Yes   


Qualifiers: 


   Encounter type: subsequent encounter   Qualified Code(s): M97.8XXD - 

Periprosthetic fracture around other internal prosthetic joint, subsequent 

encounter; Z96.649 - Presence of unspecified artificial hip joint   





(2) Postoperative anemia


SNOMED Code(s): 241536695, 157623777


   Code(s): D64.9 - ANEMIA, UNSPECIFIED   Status: Acute   Current Visit: Yes   





(3) S/P ORIF (open reduction internal fixation) fracture


SNOMED Code(s): 399823966


   Code(s): Z98.890 - OTHER SPECIFIED POSTPROCEDURAL STATES; Z87.81 - PERSONAL 

HISTORY OF (HEALED) TRAUMATIC FRACTURE   Status: Acute   Current Visit: Yes   

Annotation/Comment:: Right Femur   





- Problem List Review


Problem List Initiated/Reviewed/Updated: Yes





- My Orders


Last 24 Hours: 


                                Medication Orders





Hydrocodone Bitart/Acetaminophen (Norco 325-5 Mg)  2 tab PO Q4H PRN


   PRN Reason: Pain (mild 1-3)


   Last Admin: 07/08/20 09:14  Dose: 2 tab


   Documented by: LAKSHMI


Albuterol (Proventil Neb Soln)  2.5 mg INH Q4H PRN


   PRN Reason: Shortness of Breath


Albuterol/Ipratropium (Duoneb 3.0-0.5 Mg/3 Ml)  3 ml INH QIDRT [facility]


   Last Admin: 07/09/20 10:52  Dose: 3 ml


   Documented by: HUMA


   Admin: 07/09/20 07:52  Dose: 3 ml


   Documented by: HUMA


   Admin: 07/08/20 21:09  Dose: 3 ml


   Documented by: ARON


   Admin: 07/08/20 14:19  Dose: 3 ml


   Documented by: SHARON


   Admin: 07/08/20 10:32  Dose: 3 ml


   Documented by: SHARON


Bandage/Support Products ( Nasal )  1 applic NASBOTH BID [facility]


   Stop: 07/15/20 09:01


   Last Admin: 07/09/20 09:36  Dose: 1 applic


   Documented by: KELSEY


   Admin: 07/08/20 21:04  Dose: 1 applic


   Documented by: ARON


   Admin: 07/08/20 08:53  Dose: 1 puff


   Documented by: SHARON


Diphenhydramine HCl (Benadryl)  25 mg IVPUSH Q6H PRN


   PRN Reason: Itching


Diphenhydramine HCl (Benadryl)  25 mg PO Q6H PRN


   PRN Reason: Itching


Docusate Sodium (Colace)  100 mg PO DAILY [facility]


   Last Admin: 07/09/20 09:36  Dose: 100 mg


   Documented by: KELSEY


   Admin: 07/08/20 08:45  Dose: 100 mg


   Documented by: LAKSHMI


Enoxaparin Sodium (Lovenox)  30 mg SUBCUT DAILY [facility]


   Last Admin: 07/09/20 09:36  Dose: 30 mg


   Documented by: KELSEY


   Admin: 07/08/20 08:45  Dose: 30 mg


   Documented by: LAKSHMI


Hydromorphone HCl (Dilaudid)  2 - 4 mg PO Q4H PRN


   PRN Reason: Pain (moderate 4-6)


   Last Admin: 07/09/20 12:55  Dose: 4 mg


   Documented by: KELSEY


   Admin: 07/08/20 07:09  Dose: 4 mg


   Documented by: SHERRILL


   Admin: 07/08/20 03:04  Dose: 4 mg


   Documented by: SHERRILL


Sodium Chloride (Normal Saline)  1,000 mls @ 125 mls/hr IV ASDIRECTED [facility]


   Last Admin: 07/09/20 02:02  Dose: 125 mls/hr


   Documented by: ARON


   Infusion: 07/09/20 01:32  Dose: 125 mls/hr


   Documented by: ARON


   Admin: 07/08/20 17:32  Dose: 125 mls/hr


   Documented by: ALEX


   Infusion: 07/08/20 16:44  Dose: 125 mls/hr


   Documented by: ALEX


   Admin: 07/08/20 08:44  Dose: 125 mls/hr


   Documented by: LAKSHMI


Magnesium Hydroxide (Milk Of Magnesia)  30 ml PO Q6H PRN


   PRN Reason: Stool Softener


Metoprolol Succinate (Toprol Xl)  50 mg PO BID [facility]


   Last Admin: 07/09/20 09:37  Dose: 50 mg


   Documented by: KELSEY


   Admin: 07/08/20 21:03  Dose: 50 mg


   Documented by: ARON


   Admin: 07/08/20 08:46  Dose: 50 mg


   Documented by: LAKSHMI


Morphine Sulfate (Morphine)  2 mg IVPUSH Q1H PRN


   PRN Reason: Breakthrough Pain


   Last Admin: 07/08/20 08:26  Dose: 2 mg


   Documented by: LAKSHMI


   Admin: 07/08/20 05:43  Dose: 2 mg


   Documented by: SHERRILL


   Admin: 07/07/20 23:03  Dose: 2 mg


   Documented by: SHERRILL


Naloxone HCl (Narcan)  0.04 mg IVPUSH Q3M PRN


   PRN Reason: Respiratory Depression


Ondansetron HCl (Zofran Odt)  4 mg PO Q6H PRN


   PRN Reason: Nausea/Vomiting


   Last Admin: 07/07/20 23:04  Dose: 4 mg


   Documented by: SHERRILL


Ondansetron HCl (Zofran)  4 mg IVPUSH Q6H PRN


   PRN Reason: Nausea/Vomiting


Fluticasone/Salmeterol (Fluticasone-Salmeterol 113-14 Mcg Powder Inh)  0 puff 

INH BIDRT [facility]


   Last Admin: 07/09/20 07:55  Dose: 1 puff


   Documented by: HUMA


   Admin: 07/08/20 21:05  Dose: 1 puff


   Documented by: ARON


   Admin: 07/08/20 10:32  Dose: 1 puff


   Documented by: SHARON











- Assessment


Assessment           (Free Text/Narrative):: 





S/P ORIF of right femoral fracture, difficulty with pain control, vital stable 

and H/H are ok, has not been able to do much with PT





- Plan


Plan                        (Free Text/Narrative):: 





Added Dilaudid PCA for pain, try to initiate PT, went home after her recent ANA 

but may need placement now, has some help at home but limited.

## 2020-07-09 NOTE — CR
Hip Min 1V Rt, Femur Min 1V Rt

 

CLINICAL HISTORY: Fall

 

FINDINGS: Patient is a total right hip arthroplasty. There is a placed fracture

of the proximal femoral shaft acetabular component appears well seated.

 

IMPRESSION: Total hip arthroplasty

 

Displaced fracture proximal femoral shaft

 

 

 

Femur Min 1V Rt

 

CLINICAL HISTORY: Fracture

 

Findings: Patient is status post open reduction of proximal femoral fracture.

There is a total hip arthroplasty. There is a long fixation plate and straps

over the femoral shaft and subtrochanteric region

 

 

IMPRESSION: Status post open reduction proximal femoral fracture.

 

Previous total hip arthroplasty

## 2020-07-21 NOTE — OR
DATE OF PROCEDURE:  06/29/2020

 

SURGEON:  Jorge Cruz MD

 

PREOPERATIVE DIAGNOSIS:  Osteoarthritis, right hip.

 

POSTOPERATIVE DIAGNOSIS:  Osteoarthritis right hip.

 

PROCEDURE:  Right total hip arthroplasty using Da M/L taper stem size 12.5 with a 52 mm

Continuum cup and a +3.5 femoral head.

 

ANESTHESIA:  Spinal with sedation.

 

INDICATIONS:  Karla is a 65-year-old female with a history of progressive right hip pain.

She had history of previous left total hip arthroplasty last year and has done very well

with that.  She is now experiencing increasing pain on the right side.  X-rays and

examination are consistent with end-stage osteoarthritis.  Risks, benefits, and potential

complications were discussed.

 

PROCEDURE IN DETAIL:  After adequate spinal anesthesia was obtained, the patient was placed

in a lateral decubitus position and secured with the hip positioner.  Right hip and leg were

then prepped and draped in a sterile fashion.  A longitudinal incision was made over the

lateral aspect of the hip, carried down through the subcutaneous tissues.  Tensor fascia and

IT band were then split in line with the fibers and a Charnley retractor was placed.  Short

external rotators were taken off the greater trochanter along with the capsule and the

capsule was opened in a T-fashion.  Hip was then dislocated and a femoral neck cut was made

with an oscillating saw.  Retractors were placed about the acetabulum.  Acetabular labrum

was excised along with soft tissues in the central acetabulum.  This was then sequentially

reamed to 52 mm and a Continuum cup was press-fit into position and additional fixation

obtained with acetabular screw.  The polyethylene liner was then placed.  This was irrigated

and attention was turned to the femur.

 

Box osteotome was used to remove the lateral femoral neck cortex.  Hand awl was placed down

the canal and the canal was then sequentially reamed to size 12.5 stem.  Trial reduction was

made with a +3.5 neck length, which appeared to recreate limb length and provided good

stability.  Trials were removed.  Hip was irrigated and the M/L taper stem was press-fit

into position.  This was trialed once again with the 0 and a +3.5 neck length with the +3.5

neck length providing better stability.  Trial was removed.  Final femoral head was tapped

onto the trunnion and reduced.  Hip was irrigated with pulse lavage, followed by dilute

Betadine solution and pulse lavage once again.  Capsule was then closed with #2 Ethibond.

Tensor fascia was closed with #2 Ethibond in a running locking fashion.  Skin was closed

with 2-0 Vicryl and a 3-0 Monocryl.  Steri-Strips were applied.  Sterile dressing was then

placed.  The patient tolerated the procedure well.  There were no complications.  She was

taken from the operating room in stable condition.

 

 

 

Jorge Cruz MD

DD:  07/21/2020 08:08:32

DT:  07/21/2020 08:24:35

Job #:  [id number]

## 2020-07-22 NOTE — OR
DATE OF PROCEDURE:  07/07/2020

 

SURGEON:  Jorge Cruz MD

 

PREOPERATIVE DIAGNOSIS:  Periprosthetic fracture, right femur.

 

POSTOPERATIVE DIAGNOSIS:  Periprosthetic fracture, right femur.

 

PROCEDURE:  Open reduction and internal fixation of right femur.

 

ANESTHESIA:  General.

 

INDICATIONS:  Karla is a very pleasant 65-year-old female, who had recently 
undergone a

right total hip arthroplasty and in fact had been seen by myself earlier in the 
day for a

followup visit on her right hip.  She had been doing well with that.  She had a 
physical

therapy treatment after the office visit and was at home outside on her deck and
apparently

tripped and fell on her way back into the house.  She presented to the emergency
room with

obvious instability of the right hip.  X-rays revealed a periprosthetic fracture
at the tip

of the stem.  The proximal metaphysis appears intact.  She was taken to the 
operating room

for open reduction and internal fixation.  We did discuss possibility of the 
need for

exchanging the stem out for a longer stem which stabilizes below the fracture.  
The risks,

benefits, potential complications of the procedure were discussed.

 

PROCEDURE IN DETAIL:  After adequate anesthesia was obtained, patient was placed
in the

lateral decubitus position and secured with a hip positioner.  Right hip and leg
were then

prepped and draped in a sterile fashion.  It should be noted that she did 
dehisce a small

portion of the surgical site in the fall.  This did include the superficial 
closure and a

very small section of the tensor fascia.  The area of wound dehiscence is well 
above the

fracture and would not be considered an open fracture.  After the hip and leg 
were prepped,

the open area was incised slightly more superior and extended distally down 
along the

lateral aspect of the thigh.  The open wound was irrigated with pulse lavage.  
The tensor

fascia was split beginning up over the greater trochanter in the area of the 
previous

surgical site and extended down along the IT band to just beyond mid thigh.  
Vastus

lateralis was elevated anteriorly after dividing the fascia posteriorly.  The 
fracture was

identified.  The hip was not dislocated.  There was no evidence of any 
displacement of the

femoral or acetabular components.  Stem did not appear loose within the 
metaphysis.  The

fracture was reduced with bone clamps and then held in this position with two 
Cable 

cerclage cables.  These were tensioned but not crimped and cut.  A [employer] large
fragment

compression plate was then selected to span the fracture, and this was held in 
place

initially with additional cerclage cables just above the fracture site, and once
alignment

was established, distal fixation was accomplished using 4.5 screws with 
bicortical contact

and excellent purchase.  Additional cerclage wires were then placed proximally 
up to the

level of the neck of the prosthetic stem.  Beginning with the cables around the 
fracture

site itself, the cables were re-tensioned, crimped, and cut.  The remaining 
cables were also

re-tensioned, crimped and cut beginning distally at the fracture site and 
working proximally

up along the femur.  Excellent fixation and reduction was obtained.  The

wound was thoroughly irrigated.  The vastus lateralis was allowed to fall back 
into position

and the fascia was lightly

closed with 0 Vicryl.  The IT band and tensor fascia were then closed with #2 
Ethibond in

several sections of running locking suture.  Wound was irrigated again and the 
skin was then

closed with 2-0 Vicryl and surgical staples.  Sterile

dressing was then applied.  The patient tolerated the procedure very well.  
There were no

complications, taken from the operating room in stable condition.

 

 

 

 

Jorge Cruz MD

DD:  07/22/2020 16:20:10

DT:  07/22/2020 19:28:05

Job #:  [id number]

TRINY

## 2020-07-25 NOTE — EDM.PDOC
ED HPI GENERAL MEDICAL PROBLEM





- General


Chief Complaint: Respiratory Problem


Stated Complaint: PNEUMONIA SYMPTOMS, DRY COUGH


Time Seen by Provider: 07/25/20 21:26


Source of Information: Reports: Patient


History Limitations: Reports: No Limitations





- History of Present Illness


INITIAL COMMENTS - FREE TEXT/NARRATIVE: 





66 yo female with past hx of COPD presents to the ER with chest congestion.  

Over the last 12 hours she has had an increase in chest congestion and dry 

cough.  SHe is post hip fx and currently in assisted living for rehab.  afebrile

appetite is normal.  





- Related Data


                                    Allergies











Allergy/AdvReac Type Severity Reaction Status Date / Time


 


nickel Allergy  Rash Verified 07/25/20 21:29











Home Meds: 


                                    Home Meds





Aspirin [Adult Low Dose Aspirin EC] 81 mg PO DAILY 09/10/13 [History]


Calcium Carb/Vitamin D3/Vit K1 [Calcium + D Soft Chewable Tab] 1 each PO DAILY 

09/10/13 [History]


Multivitamin [Multi-Vitamin Daily] 1 tab PO MOWEFR@0900 09/10/13 [History]


Metoprolol Succinate 50 mg PO BID 09/11/13 [History]


Albuterol/Ipratropium [Combivent] 1 puff INH QID 09/01/17 [History]


Aspirin/Caffeine [Percy Back-Body 500-32.5 mg] 2 tab PO BID PRN 12/04/18 

[History]


Albuterol [Proventil Neb Soln] 2.5 mg INH Q4H PRN 08/26/19 [History]


Fluticasone/Vilanterol [Breo Ellipta 100-25 MCG Inhalation Kit] 1 each IH DAILY 

08/26/19 [History]


Albuterol/Ipratropium [DuoNeb 3.0-0.5 MG/3 ML] 3 ml INH QID 04/13/20 [History]


oxyCODONE HCl/Acetaminophen [Percocet 5-325 mg Tablet] 1 - 2 each PO Q6HR PRN 

#40 tablet 07/01/20 [Rx]


Enoxaparin Sodium [Lovenox] 40 mg SQ DAILY 28 Days #28 syringe 07/09/20 [Rx]


HYDROmorphone [Dilaudid] 2 - 4 mg PO Q4H PRN #50 tab 07/13/20 [Rx]


HYDROmorphone [Dilaudid] 2 - 4 mg PO Q4H PRN #50 tab 07/13/20 [Rx]


HYDROmorphone [Dilaudid] 2 - 4 mg PO Q6H PRN 7 Days #46 tab 07/20/20 [Rx]


Acetaminophen [Tylenol] 650 mg PO Q4H PRN 07/25/20 [History]


Diclofenac Sodium 1 gram TOP TID 07/25/20 [History]


Melatonin 6 mg PO DAILY 07/25/20 [History]


Ondansetron [Zofran ODT] 4 mg PO Q6H PRN 07/25/20 [History]


Tuberculin,Purif.Prot.Deriv. [Tubersol] 5 tub ID DAILY 07/25/20 [History]


cephALEXin [Keflex] 500 mg PO TID 07/25/20 [History]











Past Medical History


HEENT History: Reports: Hard of Hearing, Impaired Vision


Cardiovascular History: Reports: Afib, CAD, Hypertension, Other (See Below)


Other Cardiovascular History: ablation 2013


Respiratory History: Reports: COPD, Pneumonia, Recurrent, SOB


Gastrointestinal History: Reports: Other (See Below)


Other Gastrointestinal History: colitis


Genitourinary History: Reports: Other (See Below)


Other Genitourinary History: bladder infection x1


OB/GYN History: Reports: Pregnancy


Musculoskeletal History: Reports: Arthritis, Back Pain, Chronic, Fracture


Other Musculoskeletal History: LAMINECTOMY 2017


Neurological History: Reports: Other (See Below)


Other Neuro History: abnormal vascular malformation repaired 2012


Psychiatric History: Reports: Addiction, Anxiety, Depression


Endocrine/Metabolic History: Reports: None


Hematologic History: Reports: Anemia


Immunologic History: Reports: None


Oncologic (Cancer) History: Reports: None


Dermatologic History: Reports: Eczema





- Infectious Disease History


Infectious Disease History: Reports: Chicken Pox, Measles





- Past Surgical History


Head Surgeries/Procedures: Reports: None


HEENT Surgical History: Reports: Tonsillectomy, Other (See Below)


Other HEENT Surgeries/Procedures: ear surgery


Cardiovascular Surgical History: Reports: Cardiac Ablation


Respiratory Surgical History: Reports: None


GI Surgical History: Reports: Colonoscopy


Female  Surgical History: Reports: Salpingo-Oophorectomy


Neurological Surgical History: Reports: Laminectomy


Musculoskeletal Surgical History: Reports: Hip Replacement, ORIF


Other Musculoskeletal Surgeries/Procedures:: RT total hip 6/29/20.  LT total 

hip.  ORIF RT femur  7/7/20


Dermatological Surgical History: Reports: Skin Biopsy





Social & Family History





- Family History


Family Medical History: Noncontributory





- Tobacco Use


Smoking Status *Q: Former Smoker


Years of Tobacco use: 50


Used Tobacco, but Quit: Yes


Month/Year Tobacco Last Used: 2 months ago





- Caffeine Use


Caffeine Use: Reports: Coffee


Caffeine Use Comment: 2 cups/daily





- Recreational Drug Use


Recreational Drug Use: No





- Living Situation & Occupation


Living situation: Reports: 


Occupation: Retired (lives with , retired, in [place], [place]. has three 

grown children)





ED ROS GENERAL





- Review of Systems


Review Of Systems: See Below


Constitutional: Denies: Fever, Chills, Fatigue


Respiratory: Reports: Wheezing, Cough.  Denies: Shortness of Breath


Cardiovascular: Denies: Chest Pain


GI/Abdominal: Denies: Abdominal Pain





ED EXAM, GENERAL





- Physical Exam


Exam: See Below


Exam Limited By: No Limitations


General Appearance: Alert, WD/WN, No Apparent Distress


Ears: Normal External Exam, Normal Canal, Normal TMs


Nose: Normal Inspection, Normal Mucosa


Throat/Mouth: Normal Inspection, Normal Lips, Normal Teeth, Normal Gums


Head: Atraumatic, Normocephalic


Neck: Normal Inspection, Supple, Non-Tender, Full Range of Motion.  No: Carotid 

Bruit, Lymphadenopathy (R)


Respiratory/Chest: No Respiratory Distress, Decreased Breath Sounds, Wheezing 

(scattered)


Cardiovascular: Normal Peripheral Pulses, Regular Rate, Rhythm





Course





- Vital Signs


Last Recorded V/S: 


                                Last Vital Signs











Temp  36.7 C   07/25/20 21:53


 


Pulse  89   07/25/20 21:53


 


Resp  16   07/25/20 21:53


 


BP  136/81   07/25/20 21:53


 


Pulse Ox  97   07/25/20 21:53














- Orders/Labs/Meds


Orders: 


                               Active Orders 24 hr











 Category Date Time Status


 


 Chest 2V [CR] Stat Exams  07/25/20 21:59 Taken











Labs: 


                                Laboratory Tests











  07/25/20 Range/Units





  21:59 


 


WBC  9.0  (4.5-11.0)  K/uL


 


RBC  3.78  (3.30-5.50)  M/uL


 


Hgb  11.9 L  (12.0-15.0)  g/dL


 


Hct  39.0  (36.0-48.0)  %


 


MCV  103 H  (80-98)  fL


 


MCH  32 H  (27-31)  pg


 


MCHC  31 L  (32-36)  %


 


Plt Count  578 H  (150-400)  K/uL


 


Neut % (Auto)  48  (36-66)  %


 


Lymph % (Auto)  38  (24-44)  %


 


Mono % (Auto)  12 H  (2-6)  %


 


Eos % (Auto)  1 L  (2-4)  %


 


Baso % (Auto)  1  (0-1)  %














- Re-Assessments/Exams


Free Text/Narrative Re-Assessment/Exam: 





07/25/20 22:44


Azithromycin 500 mg for 5 days


prednisone 2 tablets for 5 days


increase fluid intake with goal of 64 ounces per day 








Departure





- Departure


Time of Disposition: 22:45


Disposition: Home, Self-Care 01


Condition: Good


Clinical Impression: 


 COPD exacerbation








- Discharge Information


*PRESCRIPTION DRUG MONITORING PROGRAM REVIEWED*: No


*COPY OF PRESCRIPTION DRUG MONITORING REPORT IN PATIENT CIRILO: No


Instructions:  Eating Plan for Chronic Obstructive Pulmonary Disease


Referrals: 


Cameron Gallegos MD [Primary Care Provider] - 


Forms:  ED Department Discharge


Additional Instructions: 


Azithromycin 500 mg for 5 days


prednisone 2 tablets for 5 days


increase fluid intake with goal of 64 ounces per day 





Sepsis Event Note (ED)





- Evaluation


Sepsis Screening Result: No Definite Risk





- Focused Exam


Vital Signs: 


                                   Vital Signs











  Temp Pulse Resp BP Pulse Ox


 


 07/25/20 21:53  36.7 C  89  16  136/81  97


 


 07/25/20 21:21  36.7 C  89  16  136/81  97














- My Orders


Last 24 Hours: 


My Active Orders





07/25/20 21:59


Chest 2V [CR] Stat 














- Assessment/Plan


Last 24 Hours: 


My Active Orders





07/25/20 21:59


Chest 2V [CR] Stat

## 2020-07-27 NOTE — CR
CHEST: 2 view

 

CLINICAL HISTORY:Cough

 

COMPARISON:April 2020

 

FINDINGS:  The heart size, pulmonary vascularity and hilar structures are

normal. No infiltrate effusion or pneumothorax is seen. There is generalized

hyperaeration. There is some minimal patchy pleural parenchymal scarring or

fibrosis in both lung bases

There are atherosclerotic changes in the aorta.

 

 

IMPRESSION: No acute cardiopulmonary process.

 

Chronic lung field changes

## 2020-10-16 NOTE — EDM.PDOC
ED HPI GENERAL MEDICAL PROBLEM





- General


Chief Complaint: Respiratory Problem


Stated Complaint: SOB


Time Seen by Provider: 10/16/20 16:30


Source of Information: Reports: Patient


History Limitations: Reports: No Limitations





- History of Present Illness


INITIAL COMMENTS - FREE TEXT/NARRATIVE: 





65-year-old female with known COPD, presents with increasing shortness of breath

for the past 3 to 5 days.  She used her nebulizer 2 more times than usual today 

and it did not help much so she came in.  She gets hypoxic with activity, does 

not have a fever chills, has a cough but is nonproductive.  She says she has 

responded to steroids and antibiotics in the past when she gets like this.  No 

exposure to coronavirus that she knows of.


Onset: Gradual


Duration: Day(s): (5 days)


Associated Symptoms: Reports: Cough, Malaise, Shortness of Breath, Weakness.  

Denies: Chest Pain





- Related Data


                                    Allergies











Allergy/AdvReac Type Severity Reaction Status Date / Time


 


nickel Allergy  Rash Verified 10/16/20 16:01











Home Meds: 


                                    Home Meds





Aspirin [Adult Low Dose Aspirin EC] 81 mg PO DAILY 09/10/13 [History]


Calcium Carb/Vitamin D3/Vit K1 [Calcium + D Soft Chewable Tab] 1 each PO DAILY 

09/10/13 [History]


Multivitamin [Multi-Vitamin Daily] 1 tab PO MOWEFR@0900 09/10/13 [History]


Metoprolol Succinate 50 mg PO BID 09/11/13 [History]


Albuterol/Ipratropium [Combivent] 1 puff INH QID 09/01/17 [History]


Aspirin/Caffeine [Percy Back-Body 500-32.5 mg] 2 tab PO BID PRN 12/04/18 

[History]


Albuterol [Proventil Neb Soln] 2.5 mg INH Q4H PRN 08/26/19 [History]


Fluticasone/Vilanterol [Breo Ellipta 100-25 MCG Inhalation Kit] 1 each IH DAILY 

08/26/19 [History]


Albuterol/Ipratropium [DuoNeb 3.0-0.5 MG/3 ML] 3 ml INH QID 04/13/20 [History]


Acetaminophen [Tylenol] 650 mg PO Q4H PRN 07/25/20 [History]


Diclofenac Sodium 1 gram TOP TID 07/25/20 [History]


Melatonin 6 mg PO DAILY 07/25/20 [History]


Ondansetron [Zofran ODT] 4 mg PO Q6H PRN 07/25/20 [History]


Tuberculin,Purif.Prot.Deriv. [Tubersol] 5 tub ID DAILY 07/25/20 [History]


Doxycycline [Vibramycin] 100 mg PO BID 5 Days #10 cap 10/18/20 [Rx]


predniSONE 40 mg PO DAILY@0800 6 Days #7 tablet 10/18/20 [Rx]











Past Medical History


HEENT History: Reports: Hard of Hearing, Impaired Vision


Cardiovascular History: Reports: Afib, CAD, Hypertension, Other (See Below)


Other Cardiovascular History: ablation 2013


Respiratory History: Reports: COPD, Pneumonia, Recurrent, SOB


Gastrointestinal History: Reports: Other (See Below)


Other Gastrointestinal History: colitis


Genitourinary History: Reports: Other (See Below)


Other Genitourinary History: bladder infection x1


OB/GYN History: Reports: Pregnancy


Musculoskeletal History: Reports: Arthritis, Back Pain, Chronic


Other Musculoskeletal History: LAMINECTOMY 2017


Neurological History: Reports: Other (See Below)


Other Neuro History: abnormal vascular malformation repaired 2012


Psychiatric History: Reports: Depression


Endocrine/Metabolic History: Reports: None


Hematologic History: Reports: None


Immunologic History: Reports: None


Oncologic (Cancer) History: Reports: None


Dermatologic History: Reports: Eczema





- Infectious Disease History


Infectious Disease History: Reports: Chicken Pox, Measles





- Past Surgical History


Head Surgeries/Procedures: Reports: None


HEENT Surgical History: Reports: Tonsillectomy, Other (See Below)


Other HEENT Surgeries/Procedures: ear surgery


Cardiovascular Surgical History: Reports: Cardiac Ablation


Respiratory Surgical History: Reports: None


GI Surgical History: Reports: Colonoscopy


Female  Surgical History: Reports: Salpingo-Oophorectomy


Neurological Surgical History: Reports: Laminectomy


Musculoskeletal Surgical History: Reports: Hip Replacement


Other Musculoskeletal Surgeries/Procedures:: right total hip,  orif right femur.

  left total hip


Dermatological Surgical History: Reports: Skin Biopsy





Social & Family History





- Family History


Family Medical History: Noncontributory





- Tobacco Use


Tobacco Use Status *Q: Former Tobacco User


Used Tobacco, but Quit: Yes


Month/Year Tobacco Last Used: 4





- Caffeine Use


Caffeine Use: Reports: Coffee


Caffeine Use Comment: 2 cups/daily





- Living Situation & Occupation


Living situation: Reports: 


Occupation: Retired (lives with , retired, in [place], [place]. has three 

grown children)





ED ROS GENERAL





- Review of Systems


Review Of Systems: See Below


Constitutional: Reports: Malaise.  Denies: Fever, Chills


HEENT: Denies: Throat Pain


Respiratory: Reports: Shortness of Breath, Wheezing, Cough.  Denies: Sputum


Cardiovascular: Denies: Chest Pain


GI/Abdominal: Denies: Abdominal Pain, Nausea, Vomiting


Musculoskeletal: Denies: Neck Pain, Joint Pain


Skin: Reports: No Symptoms


Neurological: Denies: Headache


Psychiatric: Reports: No Symptoms





ED EXAM, GENERAL





- Physical Exam


Exam: See Below


Exam Limited By: No Limitations


General Appearance: Alert, No Apparent Distress, Other (Fairly comfortable when 

lying still on 1 L of nasal cannula O2.)


Eye Exam: Bilateral Eye: Normal Inspection


Head: Atraumatic


Neck: Non-Tender


Respiratory/Chest: No Respiratory Distress, Wheezing (Diffuse expiratory wheezes

 are heard)


Cardiovascular: Regular Rate, Rhythm.  No: Tachycardia


Extremities: Normal Inspection.  No: Pedal Edema


Neurological: Alert, Oriented


Psychiatric: Normal Affect, Normal Mood


Skin Exam: Warm, Dry





Course





- Vital Signs


Last Recorded V/S: 


                                Last Vital Signs











Temp  98 F   10/18/20 12:00


 


Pulse  83   10/18/20 10:56


 


Resp  16   10/18/20 12:00


 


BP  155/88 H  10/18/20 12:00


 


Pulse Ox  95   10/18/20 12:00














- Orders/Labs/Meds


Labs: 


                                Laboratory Tests











  10/16/20 10/16/20 10/16/20 Range/Units





  17:22 17:22 17:22 


 


WBC  9.7    (4.5-11.0)  K/uL


 


RBC  5.40    (3.30-5.50)  M/uL


 


Hgb  16.2 H D    (12.0-15.0)  g/dL


 


Hct  51.1 H    (36.0-48.0)  %


 


MCV  95    (80-98)  fL


 


MCH  30    (27-31)  pg


 


MCHC  32    (32-36)  %


 


Plt Count  435 H    (150-400)  K/uL


 


Neut % (Auto)  38    (36-66)  %


 


Lymph % (Auto)  50 H    (24-44)  %


 


Mono % (Auto)  10 H    (2-6)  %


 


Eos % (Auto)  1 L    (2-4)  %


 


Baso % (Auto)  1    (0-1)  %


 


Sodium   142   (140-148)  mmol/L


 


Potassium   5.0   (3.6-5.2)  mmol/L


 


Chloride   104   (100-108)  mmol/L


 


Carbon Dioxide   31   (21-32)  mmol/L


 


Anion Gap   6.7   (5.0-14.0)  mmol/L


 


BUN   16  D   (7-18)  mg/dL


 


Creatinine   1.2 H D   (0.6-1.0)  mg/dL


 


Est Cr Clr Drug Dosing   42.06   mL/min


 


Estimated GFR (MDRD)   45 L   (>60)  


 


Glucose   101   ()  mg/dL


 


Calcium   9.4  D   (8.5-10.1)  mg/dL


 


SARS-CoV-2 RNA ([place]ETIA)    Negative  (NEGATIVE)  











Meds: 


Medications














Discontinued Medications














Generic Name Dose Route Start Last Admin





  Trade Name Freq  PRN Reason Stop Dose Admin


 


Acetaminophen  650 mg  10/16/20 17:31 





  Tylenol  PO  





  Q4H PRN  





  Pain (Mild 1-3)/fever  


 


Acetaminophen  650 mg  10/16/20 17:34 





  Tylenol  PO  





  Q4H PRN  





  Pain  


 


Acetaminophen/Caffeine  2 tab  10/17/20 10:00  10/17/20 20:39





  Excedrin Tension Headache  PO   2 tab





  BID PRN   Administration





  Pain (moderate 4-6)  


 


Albuterol  2.5 mg  10/16/20 17:34 





  Proventil Neb Soln  INH  





  Q4H PRN  





  Shortness of Breath  


 


Albuterol/Ipratropium  3 ml  10/16/20 16:33  10/16/20 16:53





  Duoneb 3.0-0.5 Mg/3 Ml  NEB  10/16/20 16:34  3 ml





  ONETIME ONE   Administration


 


Albuterol/Ipratropium  3 ml  10/16/20 22:00  10/16/20 21:44





  Duoneb 3.0-0.5 Mg/3 Ml  INH   3 ml





  QID DEREK   Administration


 


Albuterol/Ipratropium  Confirm  10/16/20 22:00  10/16/20 22:08





  Combivent Respimat  Administered  10/16/20 22:01  Not Given





  Dose  





  4 gm  





  INH  





  .STK-MED ONE  


 


Albuterol/Ipratropium  3 ml  10/17/20 07:00  10/18/20 10:55





  Duoneb 3.0-0.5 Mg/3 Ml  INH   3 ml





  QIDRT [facility]   Administration


 


Albuterol/Ipratropium  0 gm  10/17/20 11:00  10/18/20 10:55





  Combivent Respimat  INH   Not Given





  QIDRT [facility]  


 


Aspirin  81 mg  10/17/20 09:00  10/18/20 08:05





  Halfprin  PO   81 mg





  DAILY DEREK   Administration


 


Calcium Carbonate  1 tab  10/17/20 12:00  10/18/20 12:19





  Caltrate 600+D 1500 Mg-400 Units  PO   Not Given





  DAILY@1200 [facility]  


 


Diclofenac Sodium  1 gm  10/16/20 21:00  10/18/20 08:51





  Voltaren 1% Gel  TOP   Not Given





  TID [facility]  


 


Doxycycline Hyclate  100 mg  10/16/20 19:00  10/18/20 08:05





  Vibramycin  PO   100 mg





  BID [facility]   Administration


 


Enoxaparin Sodium  40 mg  10/16/20 17:45  10/16/20 18:26





  Lovenox  SUBCUT   40 mg





  DAILY DEREK   Administration


 


Enoxaparin Sodium  40 mg  10/17/20 17:00  10/17/20 16:47





  Lovenox  SUBCUT   40 mg





  QPM DEREK   Administration


 


Melatonin  6 mg  10/17/20 21:00  10/17/20 20:40





  Melatonin  PO   6 mg





  BEDTIME DEREK   Administration


 


Methylprednisolone Sodium Succinate  125 mg  10/16/20 16:33  10/16/20 16:53





  Solu-Medrol  IVPUSH  10/16/20 16:34  125 mg





  ONETIME ONE   Administration


 


Metoprolol Succinate  50 mg  10/16/20 21:00  10/16/20 21:43





  Toprol Xl  PO   50 mg





  BID DEREK   Administration


 


Metoprolol Succinate  50 mg  10/17/20 09:00  10/18/20 08:05





  Toprol Xl  PO   50 mg





  BID [facility]   Administration


 


Mometasone Furoate/Formoterol Fumar  2 puff  10/17/20 10:00  10/18/20 07:46





  Dulera 100-5 Mcg  IH   Not Given





  BIDRT [facility]  


 


Multivitamins/Minerals  1 tab  10/19/20 09:00 





  Thera M Plus  PO  





  MOWEFR@0900 [facility]  


 


Non-Formulary Medication  1 puff  10/16/20 22:00  10/16/20 22:08





  Albuterol/Ipratropium [Combivent]  INH   1 puff





  QID [facility]   Administration


 


Non-Formulary Medication  2 tab  10/16/20 17:34 





  Aspirin/Caffeine [Percy Back-Body 500-32.5 Mg]  PO  





  BID PRN  





  Pain (moderate 4-6)  


 


Non-Formulary Medication  1 each  10/17/20 09:00  10/17/20 21:16





  Fluticasone/Vilanterol  IH   Not Given





  DAILY [facility]  


 


Non-Formulary Medication  1 puff  10/17/20 07:00  10/17/20 07:37





  Albuterol/Ipratropium [Combivent]  INH   Not Given





  QIDRT [facility]  


 


Ondansetron HCl  4 mg  10/16/20 17:31 





  Zofran Odt  PO  





  Q8H PRN  





  Nausea able to take PO  


 


Prednisone  40 mg  10/17/20 08:00  10/18/20 08:06





  Prednisone  PO   40 mg





  DAILY@0800 [facility]   Administration














- Re-Assessments/Exams


Free Text/Narrative Re-Assessment/Exam: 





10/16/20 16:55


Patient was given 125 mg of IV Solu-Medrol, a DuoNeb, and then sent back for a 

two-view chest x-ray without O2.  This will be checked when she returns.


10/16/20 17:10


Patient returned from the chest x-ray very fatigued, O2 saturations 89 to 91% 

and moderately short of breath.  This was after the extra DuoNeb.  Chest x-ray 

shows hyperventilation but no infiltrate, but she felt better when O2 was 

replaced.  I think she will need admission, CBC and BMP was obtained as well as 

a rapid COVID test, if this is positive she can be transferred to [facility] if not

 she can stay here and be treated for COPD exacerbation.


10/16/20 17:35


And further discussion with the hospitalist, the decision was made to admit the 

patient here whether the COVID is positive or not.





Departure





- Departure


Time of Disposition: 18:23


Disposition: Admitted As Inpatient 66


Clinical Impression: 


 COPD exacerbation








- Discharge Information





Sepsis Event Note (ED)





- Evaluation


Sepsis Screening Result: No Definite Risk

## 2020-10-16 NOTE — PCM.HP.2
H&P History of Present Illness





- General


Date of Service: 10/16/20


Admit Problem/Dx: 


                           Admission Diagnosis/Problem





Admission Diagnosis/Problem      COPD with acute lower respiratory infection








Source of Information: Patient


History Limitations: Reports: No Limitations





- History of Present Illness


Initial Comments - Free Text/Narative: 





Ms. Karla Bonilla is a 65 year old F who was admitted to Medicine on 16 October 2020 for management of an acute on chronic COPD exacerbation.  The 

patient has a long history of this and normally is able to temporize her 

exacerbations with po corticosteroid and atbx.  She had let it set a little 

longer this particular episode and made the decision to come in after she had 

become unable to walk a distance of more than 10-15' in her home.  She is on 

both long acting inhaled steroids as well as inhaled bronchodilators.  She is 

not oxygen dependent.  She quit smoking in June of this year.  She is otherwise 

without any symptoms or other issues - no fever, chills, or other URI symptoms. 

She is awaiting the results of a rapid COVID 19 test.  


Onset of Symptoms: Reports: Other (2-3 days PTA)


Location: Reports: Chest


Associated Symptoms: Reports: Chest Pain, Cough, Shortness of Breath.  Denies: 

Fever/Chills





- Related Data


Allergies/Adverse Reactions: 


                                    Allergies











Allergy/AdvReac Type Severity Reaction Status Date / Time


 


nickel Allergy  Rash Verified 10/16/20 16:01











Home Medications: 


                                    Home Meds





Aspirin [Adult Low Dose Aspirin EC] 81 mg PO DAILY 09/10/13 [History]


Calcium Carb/Vitamin D3/Vit K1 [Calcium + D Soft Chewable Tab] 1 each PO DAILY 

09/10/13 [History]


Multivitamin [Multi-Vitamin Daily] 1 tab PO MOWEFR@0900 09/10/13 [History]


Metoprolol Succinate 50 mg PO BID 09/11/13 [History]


Albuterol/Ipratropium [Combivent] 1 puff INH QID 09/01/17 [History]


Aspirin/Caffeine [Percy Back-Body 500-32.5 mg] 2 tab PO BID PRN 12/04/18 

[History]


Albuterol [Proventil Neb Soln] 2.5 mg INH Q4H PRN 08/26/19 [History]


Fluticasone/Vilanterol [Breo Ellipta 100-25 MCG Inhalation Kit] 1 each IH DAILY 

08/26/19 [History]


Albuterol/Ipratropium [DuoNeb 3.0-0.5 MG/3 ML] 3 ml INH QID 04/13/20 [History]


Acetaminophen [Tylenol] 650 mg PO Q4H PRN 07/25/20 [History]


Diclofenac Sodium 1 gram TOP TID 07/25/20 [History]


Melatonin 6 mg PO DAILY 07/25/20 [History]


Ondansetron [Zofran ODT] 4 mg PO Q6H PRN 07/25/20 [History]


Tuberculin,Purif.Prot.Deriv. [Tubersol] 5 tub ID DAILY 07/25/20 [History]











Past Medical History


HEENT History: Reports: Hard of Hearing, Impaired Vision


Cardiovascular History: Reports: Afib, CAD, Hypertension, Other (See Below)


Other Cardiovascular History: ablation 2013


Respiratory History: Reports: COPD, Pneumonia, Recurrent, SOB


Gastrointestinal History: Reports: Other (See Below)


Other Gastrointestinal History: colitis


Genitourinary History: Reports: Other (See Below)


Other Genitourinary History: bladder infection x1


OB/GYN History: Reports: Pregnancy


Musculoskeletal History: Reports: Arthritis, Back Pain, Chronic


Other Musculoskeletal History: LAMINECTOMY 2017


Neurological History: Reports: Other (See Below)


Other Neuro History: abnormal vascular malformation repaired 2012


Psychiatric History: Reports: Depression


Endocrine/Metabolic History: Reports: None


Hematologic History: Reports: None


Immunologic History: Reports: None


Oncologic (Cancer) History: Reports: None


Dermatologic History: Reports: Eczema





- Infectious Disease History


Infectious Disease History: Reports: Chicken Pox, Measles





- Past Surgical History


Head Surgeries/Procedures: Reports: None


HEENT Surgical History: Reports: Tonsillectomy, Other (See Below)


Other HEENT Surgeries/Procedures: ear surgery


Cardiovascular Surgical History: Reports: Cardiac Ablation


Respiratory Surgical History: Reports: None


GI Surgical History: Reports: Colonoscopy


Female  Surgical History: Reports: Salpingo-Oophorectomy


Neurological Surgical History: Reports: Laminectomy


Musculoskeletal Surgical History: Reports: Hip Replacement


Other Musculoskeletal Surgeries/Procedures:: right total hip,  orif right femur.

  left total hip


Dermatological Surgical History: Reports: Skin Biopsy





Social & Family History





- Family History


Family Medical History: Noncontributory





- Tobacco Use


Tobacco Use Status *Q: Former Tobacco User


Used Tobacco, but Quit: Yes


Month/Year Tobacco Last Used: 4





- Caffeine Use


Caffeine Use: Reports: Coffee


Caffeine Use Comment: 2 cups/daily





- Living Situation & Occupation


Living situation: Reports: 


Occupation: Retired (lives with , retired, in [place], [place]. has three 

grown children)





H&P Review of Systems





- Review of Systems:


Review Of Systems: Comprehensive ROS is negative, except as noted in HPI.





Exam





- Exam


Exam: See Below





- Vital Signs


Vital Signs: 


                                Last Vital Signs











Temp  98.4 F   10/16/20 16:10


 


Pulse  98   10/16/20 17:29


 


Resp  19   10/16/20 16:10


 


BP  129/88   10/16/20 17:29


 


Pulse Ox  93 L  10/16/20 17:29











Weight: 166 lb





- Exam


Quality Assessment: Supplemental Oxygen, DVT Prophylaxis


General: Alert, Oriented, Cooperative, Mild Distress


HEENT: PERRLA, Conjunctiva Clear, EOMI, Mucosa Moist & Pink, Posterior Pharynx 

Clear, Pupils Equal, Pupils Reactive


Neck: Supple, Trachea Midline


Lungs: Clear to Auscultation, Normal Respiratory Effort


Cardiovascular: Regular Rate, Regular Rhythm, Normal S1, Normal S2


GI/Abdominal Exam: Normal Bowel Sounds, Soft, Non-Tender, No Organomegaly, No 

Distention, No Abnormal Bruit, No Mass


Back Exam: Normal Inspection


Extremities: Normal Inspection, Normal Range of Motion, Non-Tender, No Pedal 

Edema, Normal Capillary Refill


Peripheral Pulses: 4+: Posterior Tibial (L), Posterior Tibial (R), Dorsalis 

Pedis (L), Dorsalis Pedis (R)


Skin: Warm, Dry, Intact


Neurological: Cranial Nerves Intact, Reflexes Equal Bilateral


Neuro Extensive - Mental Status: Alert, Oriented x3, Normal Mood/Affect, Normal 

Cognition, Memory Intact


Neuro Extensive - Motor, Sensory, Reflexes: CN II-XII Intact, Normal Gait, 

Normal Reflexes


DTR: 2+: Patella (L), Patella (R)


Psychiatric: Alert, Normal Affect, Normal Mood





- Patient Data


Lab Results Last 24 hrs: 


                         Laboratory Results - last 24 hr











  10/16/20 10/16/20 Range/Units





  17:22 17:22 


 


WBC  9.7   (4.5-11.0)  K/uL


 


RBC  5.40   (3.30-5.50)  M/uL


 


Hgb  16.2 H D   (12.0-15.0)  g/dL


 


Hct  51.1 H   (36.0-48.0)  %


 


MCV  95   (80-98)  fL


 


MCH  30   (27-31)  pg


 


MCHC  32   (32-36)  %


 


Plt Count  435 H   (150-400)  K/uL


 


Neut % (Auto)  38   (36-66)  %


 


Lymph % (Auto)  50 H   (24-44)  %


 


Mono % (Auto)  10 H   (2-6)  %


 


Eos % (Auto)  1 L   (2-4)  %


 


Baso % (Auto)  1   (0-1)  %


 


Sodium   142  (140-148)  mmol/L


 


Potassium   5.0  (3.6-5.2)  mmol/L


 


Chloride   104  (100-108)  mmol/L


 


Carbon Dioxide   31  (21-32)  mmol/L


 


Anion Gap   6.7  (5.0-14.0)  mmol/L


 


BUN   16  D  (7-18)  mg/dL


 


Creatinine   1.2 H D  (0.6-1.0)  mg/dL


 


Est Cr Clr Drug Dosing   42.06  mL/min


 


Estimated GFR (MDRD)   45 L  (>60)  


 


Glucose   101  ()  mg/dL


 


Calcium   9.4  D  (8.5-10.1)  mg/dL











Result Diagrams: 


                                 10/16/20 17:22





                                 10/16/20 17:22





Sepsis Event Note





- Evaluation


Sepsis Screening Result: No Definite Risk





- Focused Exam


Vital Signs: 


                                   Vital Signs











  Temp Pulse Resp BP Pulse Ox


 


 10/16/20 17:29   98   129/88  93 L


 


 10/16/20 17:04   102 H   161/93 H  91 L


 


 10/16/20 16:10  98.4 F  96  19  155/92 H  93 L











Problem List Initiated/Reviewed/Updated: Yes


Orders Last 24hrs: 


                               Active Orders 24 hr











 Category Date Time Status


 


 Patient Status [ADT] Routine ADT  10/16/20 17:31 Active


 


 Ambulate [RC] QID Care  10/16/20 17:31 Active


 


 Cardiac Monitoring [RC] CONTINUOUS Care  10/16/20 17:33 Active


 


 Communication Order [RC] ASDIRECTED Care  10/16/20 17:35 Active


 


 Height and Weight [RC] DAILY Care  10/16/20 17:31 Active


 


 Intake and Output [RC] QSHIFT Care  10/16/20 17:33 Active


 


 Notify Provider [RC] PRN Care  10/16/20 17:35 Active


 


 Oxygen Therapy [RC] ASDIRECTED Care  10/16/20 17:35 Active


 


 Oxygen Therapy [RC] PRN Care  10/16/20 17:31 Active


 


 Pulse Oximetry [RC] CONTINUOUS Care  10/16/20 17:33 Active


 


 RT Aerosol Therapy [RC] ASDIRECTED Care  10/16/20 16:33 Active


 


 RT Aerosol Therapy [RC] ASDIRECTED Care  10/16/20 17:35 Active


 


 Up to Chair [RC] QID Care  10/16/20 17:31 Active


 


 Vital Signs [RC] Q4H Care  10/16/20 17:31 Active


 


 Regular Diet [DIET] Diet  10/16/20 Dinner Active


 


 Chest 2V [CR] Routine Exams  10/16/20 16:33 Taken


 


 CBC W/O DIFF,HEMOGRAM [HEME] DAILY Lab  10/17/20 05:00 Ordered


 


 CBC W/O DIFF,HEMOGRAM [HEME] DAILY Lab  10/18/20 05:00 Ordered


 


 CBC W/O DIFF,HEMOGRAM [HEME] DAILY Lab  10/19/20 05:00 Ordered


 


 COMPREHENSIVE METABOLIC PN,CMP [CHEM] DAILY Lab  10/17/20 05:00 Ordered


 


 COMPREHENSIVE METABOLIC PN,CMP [CHEM] DAILY Lab  10/18/20 05:00 Ordered


 


 COMPREHENSIVE METABOLIC PN,CMP [CHEM] DAILY Lab  10/19/20 05:00 Ordered


 


 CORONAVIRUS COVID-19 LUCRETIA [MOLEC] Routine Lab  10/16/20 17:22 Received


 


 Acetaminophen [TylenoL] Med  10/16/20 17:31 Active





 650 mg PO Q4H PRN   


 


 Acetaminophen [TylenoL] Med  10/16/20 17:34 Active





 650 mg PO Q4H PRN   


 


 Albuterol [Proventil Neb Soln] Med  10/16/20 17:34 Active





 2.5 mg INH Q4H PRN   


 


 Albuterol/Ipratropium [Combivent] Med  10/16/20 22:00 Active





 1 puff INH QID   


 


 Albuterol/Ipratropium [DuoNeb 3.0-0.5 MG/3 ML] Med  10/16/20 22:00 Active





 3 ml INH QID   


 


 Aspirin [Halfprin] Med  10/17/20 09:00 Active





 81 mg PO DAILY   


 


 Aspirin/Caffeine [Percy Back-Body 500-32.5 mg] Med  10/16/20 17:34 Active





 2 tab PO BID PRN   


 


 Calcium Carb/Vitamin D3/Vit K1 [Calcium + D Soft Med  10/17/20 09:00 Active





 Chewable Tab]   





 1 each PO DAILY   


 


 Diclofenac Sodium [Voltaren 1% Gel] Med  10/16/20 21:00 Active





 1 gm TOP TID   


 


 Enoxaparin [Lovenox] Med  10/16/20 17:45 Active





 40 mg SUBCUT DAILY   


 


 Fluticasone/Vilanterol Med  10/17/20 09:00 Active





 1 each IH DAILY   


 


 Melatonin Med  10/17/20 09:00 Active





 6 mg PO DAILY   


 


 Metoprolol Succinate [Toprol XL] Med  10/16/20 21:00 Active





 50 mg PO BID   


 


 Multivitamin [Multi-Vitamin Daily] Med  10/19/20 09:00 Active





 1 tab PO MOWEFR@0900   


 


 Ondansetron [Zofran ODT] Med  10/16/20 17:31 Active





 4 mg PO Q8H PRN   


 


 predniSONE Med  10/17/20 09:00 Active





 40 mg PO DAILY   


 


 Resuscitation Status Routine Resus Stat  10/16/20 17:31 Ordered








                                Medication Orders





Acetaminophen (Tylenol)  650 mg PO Q4H PRN


   PRN Reason: Pain (Mild 1-3)/fever


Acetaminophen (Tylenol)  650 mg PO Q4H PRN


   PRN Reason: Pain


Albuterol (Proventil Neb Soln)  2.5 mg INH Q4H PRN


   PRN Reason: Shortness of Breath


Albuterol/Ipratropium (Duoneb 3.0-0.5 Mg/3 Ml)  3 ml INH QID [facility]


Aspirin (Halfprin)  81 mg PO DAILY [facility]


Diclofenac Sodium (Voltaren 1% Gel)  1 gm TOP TID [facility]


Enoxaparin Sodium (Lovenox)  40 mg SUBCUT DAILY [facility]


Melatonin (Melatonin)  6 mg PO DAILY [facility]


Metoprolol Succinate (Toprol Xl)  50 mg PO BID [facility]


Non-Formulary Medication (Albuterol/Ipratropium [Combivent])  1 puff INH QID [facility]


Non-Formulary Medication (Aspirin/Caffeine [Percy Back-Body 500-32.5 Mg])  2 tab

PO BID PRN


   PRN Reason: Pain (moderate 4-6)


Non-Formulary Medication (Calcium Carb/Vitamin D3/Vit K1 [Calcium + D Soft 

Chewable Tab])  1 each PO DAILY [facility]


Non-Formulary Medication (Fluticasone/Vilanterol)  1 each IH DAILY [facility]


Non-Formulary Medication (Multivitamin [Multi-Vitamin Daily])  1 tab PO 

MOWEFR@0900 [facility]


Ondansetron HCl (Zofran Odt)  4 mg PO Q8H PRN


   PRN Reason: Nausea able to take PO


Prednisone (Prednisone)  40 mg PO DAILY [facility]








Assessment/Plan Comment:: 





Assessment and Plan:


1. HEENT


No active issues





2. Cardiac


No change in PTA plan of care





3. Pulmonary


Patient is in active COPD exacerbation.  On 2-4 L [place] oxygen to keep SpO2 around 

92-96%.  


Patient otherwise stable.  Got one dose of 125 mg IV solumedrol in the ED.


- Start po prednisone 40 mg po q day


- Continue PTA bronchodilators, long acting bronchodilators


- Oxygen to keep SpO2 between 92-96%


- Doxycycline 100 mg po bid x 7 days





4. F/E/N


Regular diet


PO fluids





5. Renal


No issues





6. ID


As above





7. Gastrointestinal


No active issues





8. Neuromusculoskeletal


Ambulate as tolerated


PT if needed








Disposition: To home 1-2 days





Escobar Zuñiga M.D.





16 October 2020





- Mortality Measure


Prognosis:: Good

## 2020-10-17 NOTE — PCM.PN
- General Info


Date of Service: 10/17/20


Admission Dx/Problem (Free Text): 





COPD exacerbation


Subjective Update: 





Patient had a good evening.  She is more ambulatory this AM.  Her breathing on 

the main is better per her reports.  No fever, chills or other issues


COVID 19 testing negative.  Patient is wondering if she can discharge.





- Review of Systems


General: Reports: No Symptoms


HEENT: Reports: No Symptoms


Pulmonary: Reports: Shortness of Breath, Wheezing


Cardiovascular: Reports: No Symptoms


Gastrointestinal: Reports: No Symptoms





- Patient Data


Vitals - Most Recent: 


                                Last Vital Signs











Temp  97.2 F   10/17/20 07:59


 


Pulse  78   10/16/20 21:43


 


Resp  16   10/17/20 07:59


 


BP  169/94 H  10/17/20 07:59


 


Pulse Ox  92 L  10/17/20 07:59











Weight - Most Recent: 165 lb 15.988 oz


I&O - Last 24 Hours: 


                                 Intake & Output











 10/16/20 10/17/20 10/17/20





 22:59 06:59 14:59


 


Intake Total  340 


 


Output Total 300 500 


 


Balance -300 -160 











Lab Results Last 24 Hours: 


                         Laboratory Results - last 24 hr











  10/16/20 10/16/20 10/16/20 Range/Units





  17:22 17:22 17:22 


 


WBC  9.7    (4.5-11.0)  K/uL


 


RBC  5.40    (3.30-5.50)  M/uL


 


Hgb  16.2 H D    (12.0-15.0)  g/dL


 


Hct  51.1 H    (36.0-48.0)  %


 


MCV  95    (80-98)  fL


 


MCH  30    (27-31)  pg


 


MCHC  32    (32-36)  %


 


Plt Count  435 H    (150-400)  K/uL


 


Neut % (Auto)  38    (36-66)  %


 


Lymph % (Auto)  50 H    (24-44)  %


 


Mono % (Auto)  10 H    (2-6)  %


 


Eos % (Auto)  1 L    (2-4)  %


 


Baso % (Auto)  1    (0-1)  %


 


Sodium   142   (140-148)  mmol/L


 


Potassium   5.0   (3.6-5.2)  mmol/L


 


Chloride   104   (100-108)  mmol/L


 


Carbon Dioxide   31   (21-32)  mmol/L


 


Anion Gap   6.7   (5.0-14.0)  mmol/L


 


BUN   16  D   (7-18)  mg/dL


 


Creatinine   1.2 H D   (0.6-1.0)  mg/dL


 


Est Cr Clr Drug Dosing   42.06   mL/min


 


Estimated GFR (MDRD)   45 L   (>60)  


 


Glucose   101   ()  mg/dL


 


Calcium   9.4  D   (8.5-10.1)  mg/dL


 


Total Bilirubin     (0.2-1.0)  mg/dL


 


AST     (15-37)  U/L


 


ALT     (12-78)  U/L


 


Alkaline Phosphatase     ()  U/L


 


Total Protein     (6.4-8.2)  g/dL


 


Albumin     (3.4-5.0)  g/dL


 


Globulin     (2.3-3.5)  g/dL


 


Albumin/Globulin Ratio     (1.2-2.2)  


 


SARS-CoV-2 RNA (LUCRETIA)    Negative  (NEGATIVE)  














  10/17/20 10/17/20 Range/Units





  05:50 05:50 


 


WBC  5.0   (4.5-11.0)  K/uL


 


RBC  5.01   (3.30-5.50)  M/uL


 


Hgb  15.0   (12.0-15.0)  g/dL


 


Hct  47.1   (36.0-48.0)  %


 


MCV  94   (80-98)  fL


 


MCH  30   (27-31)  pg


 


MCHC  32   (32-36)  %


 


Plt Count  402 H   (150-400)  K/uL


 


Neut % (Auto)    (36-66)  %


 


Lymph % (Auto)    (24-44)  %


 


Mono % (Auto)    (2-6)  %


 


Eos % (Auto)    (2-4)  %


 


Baso % (Auto)    (0-1)  %


 


Sodium   138 L  (140-148)  mmol/L


 


Potassium   4.4  (3.6-5.2)  mmol/L


 


Chloride   104  (100-108)  mmol/L


 


Carbon Dioxide   27  (21-32)  mmol/L


 


Anion Gap   11.4  (5.0-14.0)  mmol/L


 


BUN   16  (7-18)  mg/dL


 


Creatinine   0.8  (0.6-1.0)  mg/dL


 


Est Cr Clr Drug Dosing   62.98  mL/min


 


Estimated GFR (MDRD)   > 60  (>60)  


 


Glucose   142 H  ()  mg/dL


 


Calcium   8.8  (8.5-10.1)  mg/dL


 


Total Bilirubin   0.3  D  (0.2-1.0)  mg/dL


 


AST   11 L  (15-37)  U/L


 


ALT   22  (12-78)  U/L


 


Alkaline Phosphatase   107  ()  U/L


 


Total Protein   6.0 L  (6.4-8.2)  g/dL


 


Albumin   3.4  (3.4-5.0)  g/dL


 


Globulin   2.6  (2.3-3.5)  g/dL


 


Albumin/Globulin Ratio   1.3  (1.2-2.2)  


 


SARS-CoV-2 RNA (LUCRETIA)    (NEGATIVE)  











Med Orders - Current: 


                               Current Medications





Acetaminophen (Tylenol)  650 mg PO Q4H PRN


   PRN Reason: Pain


Albuterol (Proventil Neb Soln)  2.5 mg INH Q4H PRN


   PRN Reason: Shortness of Breath


Albuterol/Ipratropium (Duoneb 3.0-0.5 Mg/3 Ml)  3 ml INH [facility]IDRT [facility]


   Last Admin: 10/17/20 07:36 Dose:  3 ml


   Documented by: 


Albuterol/Ipratropium (Combivent Respimat)  0 gm INH QIDRT [facility]


Aspirin (Halfprin)  81 mg PO DAILY [facility]


   Last Admin: 10/17/20 08:41 Dose:  81 mg


   Documented by: 


Calcium Carbonate (Caltrate 600+D 1500 Mg-400 Units)  1 tab PO DAILY@1200 [facility]


Diclofenac Sodium (Voltaren 1% Gel)  1 gm TOP TID [facility]


   Last Admin: 10/16/20 21:44 Dose:  Not Given


   Documented by: 


Doxycycline Hyclate (Vibramycin)  100 mg PO BID [facility]


   Last Admin: 10/17/20 08:41 Dose:  100 mg


   Documented by: 


Enoxaparin Sodium (Lovenox)  40 mg SUBCUT QPM [facility]


Melatonin (Melatonin)  6 mg PO BEDTIME [facility]


Metoprolol Succinate (Toprol Xl)  50 mg PO BID [facility]


Multivitamins/Minerals (Thera M Plus)  1 tab PO MOWEFR@0900 [facility]


Non-Formulary Medication (Aspirin/Caffeine [Percy Back-Body 500-32.5 Mg])  2 tab

PO BID PRN


   PRN Reason: Pain (moderate 4-6)


Non-Formulary Medication (Fluticasone/Vilanterol)  1 each IH DAILY [facility]


Ondansetron HCl (Zofran Odt)  4 mg PO Q8H PRN


   PRN Reason: Nausea able to take PO


Prednisone (Prednisone)  40 mg PO DAILY@0800 [facility]


   Last Admin: 10/17/20 08:41 Dose:  40 mg


   Documented by: 





Discontinued Medications





Acetaminophen (Tylenol)  650 mg PO Q4H PRN


   PRN Reason: Pain (Mild 1-3)/fever


Albuterol/Ipratropium (Duoneb 3.0-0.5 Mg/3 Ml)  3 ml NEB ONETIME ONE


   Stop: 10/16/20 16:34


   Last Admin: 10/16/20 16:53 Dose:  3 ml


   Documented by: 


Albuterol/Ipratropium (Duoneb 3.0-0.5 Mg/3 Ml)  3 ml INH QID [facility]


   Last Admin: 10/16/20 21:44 Dose:  3 ml


   Documented by: 


Albuterol/Ipratropium (Combivent Respimat) Confirm Administered Dose 4 gm INH 

.STK-MED ONE


   Stop: 10/16/20 22:01


   Last Admin: 10/16/20 22:08 Dose:  Not Given


   Documented by: 


Enoxaparin Sodium (Lovenox)  40 mg SUBCUT DAILY [facility]


   Last Admin: 10/16/20 18:26 Dose:  40 mg


   Documented by: 


Methylprednisolone Sodium Succinate (Solu-Medrol)  125 mg IVPUSH ONETIME ONE


   Stop: 10/16/20 16:34


   Last Admin: 10/16/20 16:53 Dose:  125 mg


   Documented by: 


Metoprolol Succinate (Toprol Xl)  50 mg PO BID [facility]


   Last Admin: 10/16/20 21:43 Dose:  50 mg


   Documented by: 


Non-Formulary Medication (Albuterol/Ipratropium [Combivent])  1 puff INH QID [facility]


   Last Admin: 10/16/20 22:08 Dose:  1 puff


   Documented by: 


Non-Formulary Medication (Albuterol/Ipratropium [Combivent])  1 puff INH QIDRT 

[facility]


   Last Admin: 10/17/20 07:37 Dose:  Not Given


   Documented by: 











- Exam


Quality Assessment: Supplemental Oxygen, DVT Prophylaxis


General: Alert, Oriented, Cooperative, Mild Distress


Lungs: Normal Respiratory Effort, Crackles, Wheezing (Diffusely noted in all 

fields)


Cardiovascular: Regular Rate, Regular Rhythm, No Murmurs


GI/Abdominal Exam: Normal Bowel Sounds





Sepsis Event Note





- Evaluation


Sepsis Screening Result: No Definite Risk





- Focused Exam


Vital Signs: 


                                   Vital Signs











  Temp Pulse Resp BP BP Pulse Ox


 


 10/17/20 07:59  97.2 F   16   169/94 H  92 L


 


 10/17/20 04:00  96.4 F L   20   146/94 H  93 L


 


 10/17/20 00:00  96.6 F L   16   130/80  93 L


 


 10/16/20 21:43   78   136/79  














- Problem List Review


Problem List Initiated/Reviewed/Updated: Yes





- My Orders


Last 24 Hours: 


My Active Orders





10/16/20 Dinner


Regular Diet [DIET] 





10/16/20 17:31


Patient Status [ADT] Routine 


Ambulate [RC] QID 


Height and Weight [RC] DAILY 


Oxygen Therapy [RC] PRN 


Up to Chair [RC] QID 


Vital Signs [RC] Q4H 


Ondansetron [Zofran ODT]   4 mg PO Q8H PRN 


Resuscitation Status Routine 





10/16/20 17:33


Cardiac Monitoring [RC] Q6H 


Intake and Output [RC] QSHIFT 


Pulse Oximetry [RC] CONTINUOUS 





10/16/20 17:34


Acetaminophen [TylenoL]   650 mg PO Q4H PRN 


Albuterol [Proventil Neb Soln]   2.5 mg INH Q4H PRN 


Aspirin/Caffeine [Percy Back-Body 500-32.5 mg]   2 tab PO BID PRN 





10/16/20 17:35


Communication Order [RC] ASDIRECTED 


Notify Provider [RC] PRN 





10/16/20 19:00


Doxycycline [Vibramycin]   100 mg PO BID 





10/16/20 21:00


Diclofenac Sodium [Voltaren 1% Gel]   1 gm TOP TID 





10/17/20 07:00


Albuterol/Ipratropium [DuoNeb 3.0-0.5 MG/3 ML]   3 ml INH QIDRT 





10/17/20 08:00


predniSONE   40 mg PO DAILY@0800 





10/17/20 09:00


Aspirin [Halfprin]   81 mg PO DAILY 


Fluticasone/Vilanterol   1 each IH DAILY 


Metoprolol Succinate [Toprol XL]   50 mg PO BID 





10/17/20 11:00


Albuterol/Ipratropium [Combivent Respimat]   0 gm INH QIDRT 





10/17/20 12:00


Calcium Carbonate/Vitamin D3 [Caltrate 600+D 1500 MG-400 Units]   1 tab PO 

DAILY@1200 





10/17/20 17:00


Enoxaparin [Lovenox]   40 mg SUBCUT QPM 





10/17/20 21:00


Melatonin   6 mg PO BEDTIME 





10/18/20 05:00


CBC W/O DIFF,HEMOGRAM [HEME] DAILY 


COMPREHENSIVE METABOLIC PN,CMP [CHEM] DAILY 





10/19/20 05:00


CBC W/O DIFF,HEMOGRAM [HEME] DAILY 


COMPREHENSIVE METABOLIC PN,CMP [CHEM] DAILY 





10/19/20 09:00


Multivitamins w-Iron/Ca/FA/Min [Thera M Plus]   1 tab PO MOWEFR@0900 














- Plan


Plan:: 





Assessment and Plan:


1. HEENT


No active issues





2. Cardiac


No change in PTA plan of care





3. Pulmonary


Patient is in active COPD exacerbation.  On 2-4 L NC oxygen to keep SpO2 around 

92-96%.  


Patient otherwise stable.  Got one dose of 125 mg IV solumedrol in the ED.


By HD #2 the patient is improved, ambulating with less effort, breathing more 

comfortably - but still quite wheezy at this time.


- Continue po prednisone 40 mg po q day


- Continue PTA bronchodilators, long acting bronchodilators


- Oxygen to keep SpO2 between 92-96%


- Doxycycline 100 mg po bid x 5 days (Day 2/5)





4. F/E/N


Regular diet


PO fluids





5. Renal


No issues





6. ID


As above





7. Gastrointestinal


No active issues





8. Neuromusculoskeletal


Ambulate as tolerated


PT if needed








Disposition: To home today possibly if continues to remain stable





Escobar Zuñiga M.D.





17 October 2020

## 2020-10-18 NOTE — PCM.DCSUM1
**Discharge Summary





- Hospital Course


HPI Initial Comments: 





Karla Bonilla is a 66 yo F who was admitted to [facility] on 16 October 2020 for acute exacerbation of chronic COPD.  The patient has a long 

history of this disease and has numerous admissions to the hospital for it.  She

typically is stabilized with prednisone, doxycycline or other atbx.  She is not 

on chronic oxygen at home.  





During the admission she has been able to be weaned down from an admission 

oxygen level of 2L down to RA-1L at time of discharge.  The patient has 

tolerated prednisone, doxycycline well.  She has much improved clinical symptoms

and ambulatory capacity.  She feels that she has returned to baseline clinical 

status.  





She is observed through the most of the day today, retained stability, and was 

discharged in stable condition to home with recommendations for follow-up in 2-5

days.


Diagnosis: Stroke: No





- Discharge Data


Discharge Date: 10/18/20


Discharge Disposition: Home, Self-Care 01


Condition: Good





- Referral to Home Health


Primary Care Physician: 


PCP None








- Patient Instructions


Diet: Usual Diet as Tolerated


Driving: [place] Drive Today


Showering/Bathing: May Shower





- Discharge Plan


*PRESCRIPTION DRUG MONITORING PROGRAM REVIEWED*: No


*COPY OF PRESCRIPTION DRUG MONITORING REPORT IN PATIENT CIRILO: No


Prescriptions/Med Rec: 


predniSONE 40 mg PO DAILY@0800 6 Days #7 tablet


Doxycycline [Vibramycin] 100 mg PO BID 5 Days #10 cap


Home Medications: 


                                    Home Meds





Aspirin [Adult Low Dose Aspirin EC] 81 mg PO DAILY 09/10/13 [History]


Calcium Carb/Vitamin D3/Vit K1 [Calcium + D Soft Chewable Tab] 1 each PO DAILY 

09/10/13 [History]


Multivitamin [Multi-Vitamin Daily] 1 tab PO MOWEFR@0900 09/10/13 [History]


Metoprolol Succinate 50 mg PO BID 09/11/13 [History]


Albuterol/Ipratropium [Combivent] 1 puff INH QID 09/01/17 [History]


Aspirin/Caffeine [Percy Back-Body 500-32.5 mg] 2 tab PO BID PRN 12/04/18 

[History]


Albuterol [Proventil Neb Soln] 2.5 mg INH Q4H PRN 08/26/19 [History]


Fluticasone/Vilanterol [Breo Ellipta 100-25 MCG Inhalation Kit] 1 each IH DAILY 

08/26/19 [History]


Albuterol/Ipratropium [DuoNeb 3.0-0.5 MG/3 ML] 3 ml INH QID 04/13/20 [History]


Acetaminophen [Tylenol] 650 mg PO Q4H PRN 07/25/20 [History]


Diclofenac Sodium 1 gram TOP TID 07/25/20 [History]


Melatonin 6 mg PO DAILY 07/25/20 [History]


Ondansetron [Zofran ODT] 4 mg PO Q6H PRN 07/25/20 [History]


Tuberculin,Purif.Prot.Deriv. [Tubersol] 5 tub ID DAILY 07/25/20 [History]


Doxycycline [Vibramycin] 100 mg PO BID 5 Days #10 cap 10/18/20 [Rx]


predniSONE 40 mg PO DAILY@0800 6 Days #7 tablet 10/18/20 [Rx]








Oxygen Therapy Mode: Room Air


Referrals: 


PCP,None [Primary Care Provider] - 





- Discharge Summary/Plan Comment


DC Time >30 min.: Yes





- Patient Data


Vitals - Most Recent: 


                                Last Vital Signs











Temp  97.2 F   10/18/20 07:57


 


Pulse  86   10/18/20 08:05


 


Resp  16   10/18/20 07:57


 


BP  140/78   10/18/20 08:05


 


Pulse Ox  93 L  10/18/20 07:57











Weight - Most Recent: 165 lb 15.988 oz


I&O - Last 24 hours: 


                                 Intake & Output











 10/17/20 10/18/20 10/18/20





 22:59 06:59 14:59


 


Intake Total  300 


 


Balance  300 











Lab Results - Last 24 hrs: 


                         Laboratory Results - last 24 hr











  10/18/20 10/18/20 Range/Units





  04:38 05:00 


 


WBC   12.1 H  (4.5-11.0)  K/uL


 


RBC   4.35  (3.30-5.50)  M/uL


 


Hgb   12.9  D  (12.0-15.0)  g/dL


 


Hct   41.9  (36.0-48.0)  %


 


MCV   96  (80-98)  fL


 


MCH   30  (27-31)  pg


 


MCHC   31 L  (32-36)  %


 


Plt Count   362  (150-400)  K/uL


 


Sodium  140   (140-148)  mmol/L


 


Potassium  4.4   (3.6-5.2)  mmol/L


 


Chloride  106   (100-108)  mmol/L


 


Carbon Dioxide  30   (21-32)  mmol/L


 


Anion Gap  3.9 L   (5.0-14.0)  mmol/L


 


BUN  23 H   (7-18)  mg/dL


 


Creatinine  0.9   (0.6-1.0)  mg/dL


 


Est Cr Clr Drug Dosing  55.99   mL/min


 


Estimated GFR (MDRD)  > 60   (>60)  


 


Glucose  103   ()  mg/dL


 


Calcium  8.9   (8.5-10.1)  mg/dL


 


Total Bilirubin  0.3   (0.2-1.0)  mg/dL


 


AST  10 L   (15-37)  U/L


 


ALT  20   (12-78)  U/L


 


Alkaline Phosphatase  93   ()  U/L


 


Total Protein  5.4 L   (6.4-8.2)  g/dL


 


Albumin  3.1 L   (3.4-5.0)  g/dL


 


Globulin  2.3   (2.3-3.5)  g/dL


 


Albumin/Globulin Ratio  1.4   (1.2-2.2)  











Med Orders - Current: 


                               Current Medications





Acetaminophen (Tylenol)  650 mg PO Q4H PRN


   PRN Reason: Pain


Acetaminophen/Caffeine (Excedrin Tension Headache)  2 tab PO BID PRN


   PRN Reason: Pain (moderate 4-6)


   Last Admin: 10/17/20 20:39 Dose:  2 tab


   Documented by: 


Albuterol (Proventil Neb Soln)  2.5 mg INH Q4H PRN


   PRN Reason: Shortness of Breath


Albuterol/Ipratropium (Duoneb 3.0-0.5 Mg/3 Ml)  3 ml INH QIDRT [facility]


   Last Admin: 10/18/20 07:39 Dose:  3 ml


   Documented by: 


Albuterol/Ipratropium (Combivent Respimat)  0 gm INH QIDRT [facility]


   Last Admin: 10/18/20 07:46 Dose:  Not Given


   Documented by: 


Aspirin (Halfprin)  81 mg PO DAILY [facility]


   Last Admin: 10/18/20 08:05 Dose:  81 mg


   Documented by: 


Calcium Carbonate (Caltrate 600+D 1500 Mg-400 Units)  1 tab PO DAILY@1200 [facility]


   Last Admin: 10/17/20 12:00 Dose:  1 tab


   Documented by: 


Diclofenac Sodium (Voltaren 1% Gel)  1 gm TOP TID [facility]


   Last Admin: 10/18/20 08:51 Dose:  Not Given


   Documented by: 


Doxycycline Hyclate (Vibramycin)  100 mg PO BID [facility]


   Last Admin: 10/18/20 08:05 Dose:  100 mg


   Documented by: 


Enoxaparin Sodium (Lovenox)  40 mg SUBCUT QPM [facility]


   Last Admin: 10/17/20 16:47 Dose:  40 mg


   Documented by: 


Melatonin (Melatonin)  6 mg PO BEDTIME [facility]


   Last Admin: 10/17/20 20:40 Dose:  6 mg


   Documented by: 


Metoprolol Succinate (Toprol Xl)  50 mg PO BID [facility]


   Last Admin: 10/18/20 08:05 Dose:  50 mg


   Documented by: 


Mometasone Furoate/Formoterol Fumar (Dulera 100-5 Mcg)  2 puff IH BIDRT [facility]


   Last Admin: 10/18/20 07:46 Dose:  Not Given


   Documented by: 


Multivitamins/Minerals (Thera M Plus)  1 tab PO MOWEFR@0900 [facility]


Ondansetron HCl (Zofran Odt)  4 mg PO Q8H PRN


   PRN Reason: Nausea able to take PO


Prednisone (Prednisone)  40 mg PO DAILY@0800 [facility]


   Last Admin: 10/18/20 08:06 Dose:  40 mg


   Documented by: 





Discontinued Medications





Acetaminophen (Tylenol)  650 mg PO Q4H PRN


   PRN Reason: Pain (Mild 1-3)/fever


Albuterol/Ipratropium (Duoneb 3.0-0.5 Mg/3 Ml)  3 ml NEB ONETIME ONE


   Stop: 10/16/20 16:34


   Last Admin: 10/16/20 16:53 Dose:  3 ml


   Documented by: 


Albuterol/Ipratropium (Duoneb 3.0-0.5 Mg/3 Ml)  3 ml INH QID [facility]


   Last Admin: 10/16/20 21:44 Dose:  3 ml


   Documented by: 


Albuterol/Ipratropium (Combivent Respimat) Confirm Administered Dose 4 gm INH 

.STK-MED ONE


   Stop: 10/16/20 22:01


   Last Admin: 10/16/20 22:08 Dose:  Not Given


   Documented by: 


Enoxaparin Sodium (Lovenox)  40 mg SUBCUT DAILY [facility]


   Last Admin: 10/16/20 18:26 Dose:  40 mg


   Documented by: 


Methylprednisolone Sodium Succinate (Solu-Medrol)  125 mg IVPUSH ONETIME ONE


   Stop: 10/16/20 16:34


   Last Admin: 10/16/20 16:53 Dose:  125 mg


   Documented by: 


Metoprolol Succinate (Toprol Xl)  50 mg PO BID [facility]


   Last Admin: 10/16/20 21:43 Dose:  50 mg


   Documented by: 


Non-Formulary Medication (Albuterol/Ipratropium [Combivent])  1 puff INH QID [facility]


   Last Admin: 10/16/20 22:08 Dose:  1 puff


   Documented by: 


Non-Formulary Medication (Aspirin/Caffeine [Percy Back-Body 500-32.5 Mg])  2 tab

PO BID PRN


   PRN Reason: Pain (moderate 4-6)


Non-Formulary Medication (Fluticasone/Vilanterol)  1 each IH DAILY [facility]


   Last Admin: 10/17/20 21:16 Dose:  Not Given


   Documented by: 


Non-Formulary Medication (Albuterol/Ipratropium [Combivent])  1 puff INH QIDRT 

[facility]


   Last Admin: 10/17/20 07:37 Dose:  Not Given


   Documented by: 











- Exam


Quality Assessment: Denies: Supplemental Oxygen, DVT Prophylaxis


General: Reports: Alert, Oriented, Cooperative, No Acute Distress


Neck: Reports: Supple


Lungs: Reports: Normal Respiratory Effort, Wheezing (Reduced to baseline)


Cardiovascular: Reports: Regular Rate, Regular Rhythm, No Murmurs.  Denies: 

Murmurs


GI/Abdominal Exam: Normal Bowel Sounds, Soft, Non-Tender, No Distention, No 

Abnormal Bruit, No Mass

## 2020-10-19 NOTE — CR
CHEST: 2 view

 

CLINICAL HISTORY:Dyspnea

 

COMPARISON:July 2020

 

FINDINGS:  The heart size, pulmonary vascularity and hilar structures are

normal. No infiltrate effusion or pneumothorax is seen. Lungs are hyperaerated.

There are atherosclerotic changes in the aorta. Incidental lobe of stable

compression deformity of a mid thoracic vertebrae

 

IMPRESSION: No acute cardiopulmonary process.

 

Hyperaeration

## 2020-12-23 NOTE — EDM.PDOC
ED HPI GENERAL MEDICAL PROBLEM





- General


Chief Complaint: General


Stated Complaint: TROUBLE BREATHING


Time Seen by Provider: 12/23/20 18:00


Source of Information: Reports: Patient, Old Records, RN


History Limitations: Reports: No Limitations





- History of Present Illness


INITIAL COMMENTS - FREE TEXT/NARRATIVE: 





66 yo female with known COPD presents with a couple weeks of progressive cough 

and now SOB. Has not taken anything for fever today. Did use her inhalers before

arrival. Sputum is yellow. Lives with her  who stayed home. Last tested 

for Covid in September. 


Onset: Gradual


Onset Date: 12/09/20


Duration: Week(s): (2), Getting Worse


Location: Reports: Chest


Quality: Reports: Other (pain not reported)


Severity: Moderate


Improves with: Reports: Rest


Worsens with: Reports: Movement (exertion)


Context: Reports: Other (See HPI)


Associated Symptoms: Reports: Cough, Fever/Chills, Malaise, Shortness of Breath.

 Denies: Chest Pain, Headaches, Nausea/Vomiting, Rash, Syncope


Treatments PTA: Reports: Other (see below) (usual meds)





- Related Data


                                    Allergies











Allergy/AdvReac Type Severity Reaction Status Date / Time


 


nickel Allergy  Rash Verified 10/16/20 16:01











Home Meds: 


                                    Home Meds





Aspirin [Adult Low Dose Aspirin EC] 81 mg PO DAILY 09/10/13 [History]


Calcium Carb/Vitamin D3/Vit K1 [Calcium + D Soft Chewable Tab] 1 each PO DAILY 

09/10/13 [History]


Multivitamin [Multi-Vitamin Daily] 1 tab PO MOWEFR@0900 09/10/13 [History]


Metoprolol Succinate 50 mg PO BID 09/11/13 [History]


Albuterol/Ipratropium [Combivent] 1 puff INH QID 09/01/17 [History]


Aspirin/Caffeine [Percy Back-Body 500-32.5 mg] 2 tab PO BID PRN 12/04/18 

[History]


Albuterol [Proventil Neb Soln] 2.5 mg INH Q4H PRN 08/26/19 [History]


Fluticasone/Vilanterol [Breo Ellipta 100-25 MCG Inhalation Kit] 1 each IH DAILY 

08/26/19 [History]


Albuterol/Ipratropium [DuoNeb 3.0-0.5 MG/3 ML] 3 ml INH QID 04/13/20 [History]


Acetaminophen [Tylenol] 650 mg PO Q4H PRN 07/25/20 [History]


Diclofenac Sodium 1 gram TOP TID 07/25/20 [History]


Melatonin 6 mg PO DAILY 07/25/20 [History]


Ondansetron [Zofran ODT] 4 mg PO Q6H PRN 07/25/20 [History]


Tuberculin,Purif.Prot.Deriv. [Tubersol] 5 tub ID DAILY 07/25/20 [History]


Doxycycline [Vibramycin] 100 mg PO BID 5 Days #10 cap 10/18/20 [Rx]


predniSONE 40 mg PO DAILY@0800 6 Days #7 tablet 10/18/20 [Rx]











Past Medical History


HEENT History: Reports: Hard of Hearing, Impaired Vision


Cardiovascular History: Reports: Afib, CAD, Hypertension, Other (See Below)


Other Cardiovascular History: ablation 2013


Respiratory History: Reports: COPD, Pneumonia, Recurrent, SOB


Gastrointestinal History: Reports: Other (See Below)


Other Gastrointestinal History: colitis


Genitourinary History: Reports: Other (See Below)


Other Genitourinary History: bladder infection x1


OB/GYN History: Reports: Pregnancy


Musculoskeletal History: Reports: Arthritis, Back Pain, Chronic


Other Musculoskeletal History: LAMINECTOMY 2017


Neurological History: Reports: Other (See Below)


Other Neuro History: abnormal vascular malformation repaired 2012


Psychiatric History: Reports: Depression


Endocrine/Metabolic History: Reports: None


Hematologic History: Reports: None


Immunologic History: Reports: None


Oncologic (Cancer) History: Reports: None


Dermatologic History: Reports: Eczema





- Infectious Disease History


Infectious Disease History: Reports: Chicken Pox, Measles





- Past Surgical History


Head Surgeries/Procedures: Reports: None


HEENT Surgical History: Reports: Tonsillectomy, Other (See Below)


Other HEENT Surgeries/Procedures: ear surgery


Cardiovascular Surgical History: Reports: Cardiac Ablation


Respiratory Surgical History: Reports: None


GI Surgical History: Reports: Colonoscopy


Female  Surgical History: Reports: Salpingo-Oophorectomy


Neurological Surgical History: Reports: Laminectomy


Musculoskeletal Surgical History: Reports: Hip Replacement


Other Musculoskeletal Surgeries/Procedures:: right total hip,  orif right femur.

  left total hip


Dermatological Surgical History: Reports: Skin Biopsy





Social & Family History





- Family History


Family Medical History: No Pertinent Family History





- Tobacco Use


Tobacco Use Status *Q: Never Tobacco User





- Caffeine Use


Caffeine Use: Reports: Coffee


Caffeine Use Comment: 2 cups/daily





- Recreational Drug Use


Recreational Drug Use: No





- Living Situation & Occupation


Living situation: Reports: 


Occupation: Retired (lives with , retired, in [place], [place]. has three 

grown children)





ED ROS GENERAL





- Review of Systems


Review Of Systems: See Below


Constitutional: Reports: Fever, Chills, Malaise


HEENT: Reports: No Symptoms


Respiratory: Reports: Shortness of Breath, Wheezing, Cough, Sputum.  Denies: 

Pleuritic Chest Pain, Hemoptysis


Cardiovascular: Reports: No Symptoms


GI/Abdominal: Reports: No Symptoms


: Reports: No Symptoms.  Denies: Dysuria, Frequency, Urgency


Musculoskeletal: Reports: No Symptoms


Skin: Reports: No Symptoms


Neurological: Reports: No Symptoms


Psychiatric: Reports: No Symptoms





ED EXAM, GENERAL





- Physical Exam


Exam: See Below


Exam Limited By: No Limitations


General Appearance: Alert, WD/WN, No Apparent Distress


Eye Exam: Bilateral Eye: Normal Inspection


Ears: Normal External Exam, Normal Canal, Hearing Grossly Normal, Normal TMs


Ear Exam: Bilateral Ear: Auricle Normal, Canal Normal, TM normal


Nose: Normal Inspection, No Blood


Throat/Mouth: Normal Inspection, Normal Lips, Normal Oropharynx, Normal Voice, 

No Airway Compromise


Head: Atraumatic, Normocephalic


Neck: Normal Inspection


Respiratory/Chest: No Respiratory Distress, No Accessory Muscle Use, Rhonchi, 

Wheezing


Cardiovascular: Regular Rate, Rhythm, No Edema, Tachycardia


GI/Abdominal: Soft, Non-Tender


Back Exam: Normal Inspection.  No: CVA Tenderness (R), CVA Tenderness (L)


Extremities: Normal Inspection, Normal Range of Motion, Non-Tender, No Pedal 

Edema


Neurological: Alert, Oriented, CN II-XII Intact, Normal Cognition, No 

Motor/Sensory Deficits


Psychiatric: Normal Affect, Normal Mood


Skin Exam: Warm, Dry, Intact, Normal Color, No Rash





Course





- Vital Signs


Last Recorded V/S: 


                                Last Vital Signs











Temp  36.5 C   12/23/20 17:54


 


Pulse  115 H  12/23/20 17:54


 


Resp  18   12/23/20 17:54


 


BP  145/82 H  12/23/20 17:54


 


Pulse Ox  90 L  12/23/20 17:54














- Orders/Labs/Meds


Orders: 


                               Active Orders 24 hr











 Category Date Time Status


 


 Cardiac Monitoring [RC] .As Directed Care  12/23/20 18:02 Active


 


 Chest 2V [CR] Stat Exams  12/23/20 18:07 Taken


 


 CULTURE BLOOD [BC] Stat Lab  12/23/20 18:58 Received


 


 CULTURE BLOOD [BC] Stat Lab  12/23/20 19:22 Received


 


 CULTURE URINE [RM] Stat Lab  12/23/20 20:26 Ordered


 


 NS + KCl 20mEq/L [Normal Saline with 20 mEq KCl] 1,000 Med  12/23/20 19:15 

Active





 ml   





 IV ASDIRECTED   


 


 Sodium Chloride 0.9% [Saline Flush] Med  12/23/20 18:44 Active





 10 ml FLUSH ASDIRECTED PRN   


 


 Saline Lock Insert [OM.PC] Routine Oth  12/23/20 18:44 Ordered








                                Medication Orders





Potassium Chloride/Sodium Chloride (Normal Saline With 20 Meq Kcl)  1,000 mls @ 

500 mls/hr IV ASDIRECTED DEREK


   Last Admin: 12/23/20 19:37  Dose: 500 mls/hr


   Documented by: LILLIANA


Sodium Chloride (Saline Flush)  10 ml FLUSH ASDIRECTED PRN


   PRN Reason: Keep Vein Open


   Last Admin: 12/23/20 19:00  Dose: 10 ml


   Documented by: LILLIANA








Labs: 


                                Laboratory Tests











  12/23/20 12/23/20 12/23/20 Range/Units





  18:25 18:25 18:25 


 


WBC  21.9 H    (4.5-11.0)  K/uL


 


RBC  4.80    (3.30-5.50)  M/uL


 


Hgb  14.5    (12.0-15.0)  g/dL


 


Hct  45.6    (36.0-48.0)  %


 


MCV  95    (80-98)  fL


 


MCH  30    (27-31)  pg


 


MCHC  32    (32-36)  %


 


Plt Count  428 H    (150-400)  K/uL


 


Sodium   138 L   (140-148)  mmol/L


 


Potassium   3.5 L   (3.6-5.2)  mmol/L


 


Chloride   102   (100-108)  mmol/L


 


Carbon Dioxide   25   (21-32)  mmol/L


 


Anion Gap   14.5 H   (5.0-14.0)  mmol/L


 


BUN   20 H   (7-18)  mg/dL


 


Creatinine   0.6   (0.6-1.0)  mg/dL


 


Est Cr Clr Drug Dosing   84.11   mL/min


 


Estimated GFR (MDRD)   > 60   (>60)  


 


Glucose   102   ()  mg/dL


 


Lactic Acid     (0.4-2.0)  mmol/L


 


Calcium   8.7   (8.5-10.1)  mg/dL


 


C-Reactive Protein    4.15 H  (0.0-0.3)  mg/dL


 


Urine Color     (YELLOW)  


 


Urine Appearance     (CLEAR)  


 


Urine pH     (5.0-8.0)  


 


Ur Specific Gravity     (1.008-1.030)  


 


Urine Protein     (NEGATIVE)  mg/dL


 


Urine Glucose (UA)     (NEGATIVE)  mg/dL


 


Urine Ketones     (NEGATIVE)  mg/dL


 


Urine Occult Blood     (NEGATIVE)  


 


Urine Nitrite     (NEGATIVE)  


 


Urine Bilirubin     (NEGATIVE)  


 


Urine Urobilinogen     (0.2-1.0)  EU/dL


 


Ur Leukocyte Esterase     (NEGATIVE)  


 


Urine RBC     (0-5)  


 


Urine WBC     (0-5)  


 


Ur Epithelial Cells     


 


Amorphous Sediment     


 


Urine Bacteria     


 


Urine Mucus     














  12/23/20 12/23/20 Range/Units





  18:28 19:32 


 


WBC    (4.5-11.0)  K/uL


 


RBC    (3.30-5.50)  M/uL


 


Hgb    (12.0-15.0)  g/dL


 


Hct    (36.0-48.0)  %


 


MCV    (80-98)  fL


 


MCH    (27-31)  pg


 


MCHC    (32-36)  %


 


Plt Count    (150-400)  K/uL


 


Sodium    (140-148)  mmol/L


 


Potassium    (3.6-5.2)  mmol/L


 


Chloride    (100-108)  mmol/L


 


Carbon Dioxide    (21-32)  mmol/L


 


Anion Gap    (5.0-14.0)  mmol/L


 


BUN    (7-18)  mg/dL


 


Creatinine    (0.6-1.0)  mg/dL


 


Est Cr Clr Drug Dosing    mL/min


 


Estimated GFR (MDRD)    (>60)  


 


Glucose    ()  mg/dL


 


Lactic Acid  1.1   (0.4-2.0)  mmol/L


 


Calcium    (8.5-10.1)  mg/dL


 


C-Reactive Protein    (0.0-0.3)  mg/dL


 


Urine Color   Yellow  (YELLOW)  


 


Urine Appearance   Cloudy A  (CLEAR)  


 


Urine pH   5.5  (5.0-8.0)  


 


Ur Specific Gravity   1.025  (1.008-1.030)  


 


Urine Protein   Trace H  (NEGATIVE)  mg/dL


 


Urine Glucose (UA)   Negative  (NEGATIVE)  mg/dL


 


Urine Ketones   Trace H  (NEGATIVE)  mg/dL


 


Urine Occult Blood   Negative  (NEGATIVE)  


 


Urine Nitrite   Negative  (NEGATIVE)  


 


Urine Bilirubin   Small H  (NEGATIVE)  


 


Urine Urobilinogen   0.2  (0.2-1.0)  EU/dL


 


Ur Leukocyte Esterase   Trace H  (NEGATIVE)  


 


Urine RBC   0-5  (0-5)  


 


Urine WBC   10-20 H  (0-5)  


 


Ur Epithelial Cells   Few  


 


Amorphous Sediment   Not seen  


 


Urine Bacteria   Few  


 


Urine Mucus   Many  











Meds: 


Medications











Generic Name Dose Route Start Last Admin





  Trade Name Freq  PRN Reason Stop Dose Admin


 


Potassium Chloride/Sodium Chloride  1,000 mls @ 500 mls/hr  12/23/20 19:15  

12/23/20 19:37





  Normal Saline With 20 Meq Kcl  IV   500 mls/hr





  ASDIRECTED DEREK   Administration


 


Sodium Chloride  10 ml  12/23/20 18:44  12/23/20 19:00





  Saline Flush  FLUSH   10 ml





  ASDIRECTED PRN   Administration





  Keep Vein Open  














Discontinued Medications














Generic Name Dose Route Start Last Admin





  Trade Name Freq  PRN Reason Stop Dose Admin


 


Acetaminophen  1,000 mg  12/23/20 18:07  12/23/20 18:26





  Tylenol Extra Strength  PO  12/23/20 18:08  1,000 mg





  ONETIME ONE   Administration


 


Azithromycin  500 mg  12/23/20 19:11  12/23/20 19:37





  Zithromax  PO  12/23/20 19:12  500 mg





  ONETIME ONE   Administration


 


Ceftriaxone Sodium 1 gm/  50 mls @ 100 mls/hr  12/23/20 19:11  12/23/20 19:37





  Sodium Chloride  IV  12/23/20 19:40  100 mls/hr





  ONETIME ONE   Administration














- Radiology Interpretation


Free Text/Narrative:: 





CXR-posterior infiltrate





- Re-Assessments/Exams


Free Text/Narrative Re-Assessment/Exam: 





12/23/20 20:29


Offered admission, wants to go home. 





Departure





- Departure


Time of Disposition: 21:15


Disposition: Home, Self-Care 01


Condition: Fair


Clinical Impression: 


 Cystitis





Pneumonia


Qualifiers:


 Pneumonia type: due to unspecified organism Laterality: unspecified laterality 

Lung location: unspecified part of lung Qualified Code(s): J18.9 - Pneumonia, 

unspecified organism








- Discharge Information


*PRESCRIPTION DRUG MONITORING PROGRAM REVIEWED*: Not Applicable


*COPY OF PRESCRIPTION DRUG MONITORING REPORT IN PATIENT CIRILO: Not Applicable


Instructions:  Community-Acquired Pneumonia, Adult


Referrals: 


Cameron Gallegos MD [Primary Care Provider] - 


Forms:  ED Department Discharge


Additional Instructions: 


Take azithromycin 250 mg every evening until gone, this medication will be in 

your system for 10 days. Take Cephalexin 500 mg every 6 hrs starting at bedtime 

tomorrow night. Drink ample fluids. Take acetaminophen up to 1000 mg every 6 hrs

for fever control. Return if worse, otherwise follow up with your provider at 

your earliest opportunity. Continue all your regular medications. 





Sepsis Event Note (ED)





- Evaluation


Sepsis Screening Result: Possible Sepsis Risk





- Focused Exam


Vital Signs: 


                                   Vital Signs











  Temp Pulse Resp BP Pulse Ox


 


 12/23/20 17:54  36.5 C  115 H  18  145/82 H  90 L














- My Orders


Last 24 Hours: 


My Active Orders





12/23/20 18:02


Cardiac Monitoring [RC] .As Directed 





12/23/20 18:07


Chest 2V [CR] Stat 





12/23/20 18:44


Sodium Chloride 0.9% [Saline Flush]   10 ml FLUSH ASDIRECTED PRN 


Saline Lock Insert [OM.PC] Routine 





12/23/20 18:58


CULTURE BLOOD [BC] Stat 





12/23/20 19:15


NS + KCl 20mEq/L [Normal Saline with 20 mEq KCl] 1,000 ml IV ASDIRECTED 





12/23/20 19:22


CULTURE BLOOD [BC] Stat 





12/23/20 20:26


CULTURE URINE [RM] Stat 














- Assessment/Plan


Last 24 Hours: 


My Active Orders





12/23/20 18:02


Cardiac Monitoring [RC] .As Directed 





12/23/20 18:07


Chest 2V [CR] Stat 





12/23/20 18:44


Sodium Chloride 0.9% [Saline Flush]   10 ml FLUSH ASDIRECTED PRN 


Saline Lock Insert [OM.PC] Routine 





12/23/20 18:58


CULTURE BLOOD [BC] Stat 





12/23/20 19:15


NS + KCl 20mEq/L [Normal Saline with 20 mEq KCl] 1,000 ml IV ASDIRECTED 





12/23/20 19:22


CULTURE BLOOD [BC] Stat 





12/23/20 20:26


CULTURE URINE [RM] Stat

## 2020-12-28 NOTE — CR
CHEST: 2 view

 

CLINICAL HISTORY:SOB, fever

 

COMPARISON:10/16/2020

 

FINDINGS:  Heart size and pulmonary vascularity are normal. There are

atherosclerotic changes in the aorta. There is patchy pneumonic infiltrate in

left lower lobe. There is also some increased lung markings in the right lung

base. Some of this is chronic. There may be some minimal infiltrate.

 

IMPRESSION: Patchy bibasal pneumonic infiltrate, left greater than right

## 2021-06-11 NOTE — CT
Chest wo Cont

 

CLINICAL HISTORY: Right rib pain 

 

TECHNIQUE: Thin section axial contiguous tomographic sections were taken through

the chest after bolus IV iodinated contrast administration. Coronal and sagittal

images were reconstructed. Auto dosage reduction and iterative reconstruction

techniques employed.

 

FINDINGS: There is a linear fracture of the posterior lateral right atrium.

There is no associated hematoma. No effusion or pneumothorax is seen. Lungs are

generally hyperaerated. There is some mild lower lobe bronchiectasis bilaterally

There is some streaky atelectasis in the right lower lobe. No pulmonary mass or

infiltrate is identified. Mediastinum is free of mass or suspicious

lymphadenopathy. There are atherosclerotic changes in the aorta.

Scans into the upper abdomen show no mass or adenopathy

 

 

IMPRESSION: Linear fracture through the posterior lateral right eighth rib

 

Lungs are hyperaerated but clear

 

Mild lower lobe bronchiectasis

## 2021-06-11 NOTE — EDM.PDOC
ED HPI GENERAL MEDICAL PROBLEM





- General


Chief Complaint: General


Stated Complaint: BACK PAIN


Time Seen by Provider: 06/11/21 12:10


Source of Information: Reports: Patient


History Limitations: Reports: No Limitations





- History of Present Illness


INITIAL COMMENTS - FREE TEXT/NARRATIVE: 





65 year old female presents with right sided rib pain after hearing a "popping" 

sound with movement. Pt reports she was seen in clinic yesterday due to right 

rib pain, had some imaging that was unremarkable. Pt was offered a muscle 

relaxer but initially refused, called today to get muscle relaxer rx filled due 

to persistent pain. Pt heard the popping today when walking into pharmacy to 

 rx. Called ems, was given 1 mg versed due to pain via ems PTA  and 

presented for evaluation.


Onset: Today


Duration: Hour(s):, Getting Worse


Location: Reports: Other


Worsens with: Reports: Movement


Associated Symptoms: Reports: No Other Symptoms


  ** Right Back


Pain Score (Numeric/FACES): 6





- Related Data


                                    Allergies











Allergy/AdvReac Type Severity Reaction Status Date / Time


 


nickel Allergy  Rash Verified 06/11/21 11:41











Home Meds: 


                                    Home Meds





Aspirin [Adult Low Dose Aspirin EC] 81 mg PO DAILY 09/10/13 [History]


Calcium Carb/Vitamin D3/Vit K1 [Calcium + D Soft Chewable Tab] 1 each PO DAILY 

09/10/13 [History]


Multivitamin [Multi-Vitamin Daily] 1 tab PO MOWEFR@0900 09/10/13 [History]


Metoprolol Succinate 50 mg PO BID 09/11/13 [History]


Albuterol/Ipratropium [Combivent] 1 puff INH QID 09/01/17 [History]


Aspirin/Caffeine [Percy Back-Body 500-32.5 mg] 2 tab PO BID PRN 12/04/18 

[History]


Albuterol [Proventil Neb Soln] 2.5 mg INH Q4H PRN 08/26/19 [History]


Fluticasone/Vilanterol [Breo Ellipta 100-25 MCG Inhalation Kit] 1 each IH DAILY 

08/26/19 [History]


Albuterol/Ipratropium [DuoNeb 3.0-0.5 MG/3 ML] 3 ml INH QID 04/13/20 [History]


Acetaminophen [Tylenol] 650 mg PO Q4H PRN 07/25/20 [History]


Omeprazole 20 mg PO DAILY 03/17/21 [History]


Sertraline [Zoloft] 25 mg PO DAILY 03/17/21 [History]











Past Medical History


HEENT History: Reports: Hard of Hearing, Impaired Vision


Cardiovascular History: Reports: Afib, CAD, Hypertension, Other (See Below)


Other Cardiovascular History: ablation 2013


Respiratory History: Reports: COPD, Pneumonia, Recurrent, SOB


Gastrointestinal History: Reports: Other (See Below)


Other Gastrointestinal History: colitis


Genitourinary History: Reports: Other (See Below)


Other Genitourinary History: bladder infection x1


OB/GYN History: Reports: Pregnancy


Musculoskeletal History: Reports: Arthritis, Back Pain, Chronic


Other Musculoskeletal History: LAMINECTOMY 2017


Neurological History: Reports: Other (See Below)


Other Neuro History: abnormal vascular malformation repaired 2012


Psychiatric History: Reports: Depression


Endocrine/Metabolic History: Reports: None


Hematologic History: Reports: None


Immunologic History: Reports: None


Oncologic (Cancer) History: Reports: None


Dermatologic History: Reports: Eczema





- Infectious Disease History


Infectious Disease History: Reports: Chicken Pox, Measles





- Past Surgical History


Head Surgeries/Procedures: Reports: None


HEENT Surgical History: Reports: Tonsillectomy, Other (See Below)


Other HEENT Surgeries/Procedures: ear surgery


Cardiovascular Surgical History: Reports: Cardiac Ablation


Respiratory Surgical History: Reports: None


GI Surgical History: Reports: Colonoscopy


Female  Surgical History: Reports: Salpingo-Oophorectomy


Neurological Surgical History: Reports: Laminectomy


Musculoskeletal Surgical History: Reports: Hip Replacement, ORIF


Other Musculoskeletal Surgeries/Procedures:: right total hip,  orif right femur.

  left total hip.  LAMINECTOMY 2017


Dermatological Surgical History: Reports: Skin Biopsy





Social & Family History





- Family History


Family Medical History: No Pertinent Family History





- Tobacco Use


Tobacco Use Status *Q: Light Tobacco User


Years of Tobacco use: 50


Packs/Tins Daily: 0.1





- Caffeine Use


Caffeine Use: Reports: Coffee


Caffeine Use Comment: 2 cups/daily





- Alcohol Use


Days Per Week of Alcohol Use: 3


Number of Drinks Per Day: 1


Total Drinks Per Week: 3





- Recreational Drug Use


Recreational Drug Use: No





- Living Situation & Occupation


Living situation: Reports: 


Occupation: Retired (lives with , retired, in [place], [place]. has three 

grown children)





ED ROS GENERAL





- Review of Systems


Review Of Systems: See Below


Constitutional: Reports: No Symptoms


HEENT: Reports: No Symptoms


Respiratory: Reports: Shortness of Breath, Wheezing, Other (Mild shortness of 

breath due to pain with inspiration. wheezing heard on ascultation in all lobes 

of lungs, pt reports this is normal for her.)


Cardiovascular: Reports: No Symptoms


Endocrine: Reports: No Symptoms


GI/Abdominal: Reports: No Symptoms


: Reports: No Symptoms


Musculoskeletal: Reports: Other (right lower rib pain)


Skin: Reports: No Symptoms


Neurological: Reports: No Symptoms


Psychiatric: Reports: No Symptoms


Hematologic/Lymphatic: Reports: No Symptoms


Immunologic: Reports: No Symptoms





ED EXAM, GENERAL





- Physical Exam


Exam: See Below


Free Text/Narrative:: 





Pt is well appearing on cart, leaning to left side due to pain in right ribs. 

Right lower rib pain is reproducible with palpation to lower ribs on right side 

at the costal chondral boarder. Wheezing lung sounds heard throughout lung 

fields with good air exchange. Respirations are regular and non labored. skin is

 warm and dry. 


Exam Limited By: No Limitations


General Appearance: Alert


Respiratory/Chest: No Respiratory Distress, Wheezing


Back Exam: Normal Inspection


Extremities: Normal Inspection


Neurological: Alert, Oriented


Psychiatric: Normal Affect


Skin Exam: Warm, Dry, Intact





Course





- Vital Signs


Text/Narrative:: 





After patient evaluation, opted for chest CT without contrast, suspect 

costalchondritis but results pending. CT evidence for 8th rib nondisplaced 

fracture noted. Rx for Norco sent, recommend IBU and ACE bandage for comfort


Last Recorded V/S: 


                                Last Vital Signs











Temp  36.0 C L  06/11/21 11:51


 


Pulse  87   06/11/21 11:51


 


Resp  16   06/11/21 11:51


 


BP  130/77   06/11/21 11:51


 


Pulse Ox  91 L  06/11/21 11:51














Departure





- Departure


Time of Disposition: 13:46


Disposition: Home, Self-Care 01


Clinical Impression: 


 Right rib fracture








- Discharge Information


Instructions:  Rib Fracture, Easy-to-Read


Referrals: 


PCP,None [Primary Care Provider] - 


Forms:  ED Department Discharge


Additional Instructions: 


You have a non displaced 8th rib fracture on the right side. Recommend Norco for

pain, Can use ibuprofen with norco if needed. Do not take additional tylenol 

when taking the norco as there is already tylenol in the Norco.  The additional 

ibuprofen can help with pain due to inflammation. Can use ACE bandage for 

comfort when doing activity but recommend removing ACE bandage when resting to 

allow for deep breathing. Return for evaluation if you develop increased pain, 

evidence of respiratory infection such as cough, fever, or shortness of breath, 

or if condition worsens. Follow up with PCP if needing additional pain 

medication.  





Sepsis Event Note (ED)





- Evaluation


Sepsis Screening Result: No Definite Risk





- Focused Exam


Vital Signs: 


                                   Vital Signs











  Temp Pulse Resp BP Pulse Ox


 


 06/11/21 11:51  36.0 C L  87  16  130/77  91 L

## 2021-06-16 NOTE — EDM.PDOC
ED HPI GENERAL MEDICAL PROBLEM





- General


Chief Complaint: Respiratory Problem


Stated Complaint: SOB


Time Seen by Provider: 06/16/21 00:06


Source of Information: Reports: Patient


History Limitations: Reports: No Limitations





- History of Present Illness


INITIAL COMMENTS - FREE TEXT/NARRATIVE: 





Presents the emergency room today secondary to waking up short of breath having 

difficulty breathing patient states she has had some intermittent episodes today

although she is difficult to pin down on when woke up suddenly tonight with 

shortness of breath would not stop she states that she last used her nebulizer 

around 1800.  Note patient states that she had a rib fracture that occurred on 

Friday may also be a contributing factor to her shortness of breath.  Review of 

record is noted for linear fracture of the right posterior rib





PMH/Meds--reviewed in the EMR, COPD-no home oxygen


NKDA





Tob--former


Onset: Today, Sudden


Duration: Getting Worse


  ** right rib


Pain Score (Numeric/FACES): 2





- Related Data


                                    Allergies











Allergy/AdvReac Type Severity Reaction Status Date / Time


 


nickel Allergy  Rash Verified 06/15/21 23:16











Home Meds: 


                                    Home Meds





Aspirin [Adult Low Dose Aspirin EC] 81 mg PO DAILY 09/10/13 [History]


Calcium Carb/Vitamin D3/Vit K1 [Calcium + D Soft Chewable Tab] 1 each PO DAILY 

09/10/13 [History]


Multivitamin [Multi-Vitamin Daily] 1 tab PO MOWEFR@0900 09/10/13 [History]


Metoprolol Succinate 50 mg PO BID 09/11/13 [History]


Albuterol/Ipratropium [Combivent] 1 puff INH QID 09/01/17 [History]


Aspirin/Caffeine [Percy Back-Body 500-32.5 mg] 2 tab PO BID PRN 12/04/18 

[History]


Albuterol [Proventil Neb Soln] 2.5 mg INH Q4H PRN 08/26/19 [History]


Fluticasone/Vilanterol [Breo Ellipta 100-25 MCG Inhalation Kit] 1 each IH DAILY 

08/26/19 [History]


Albuterol/Ipratropium [DuoNeb 3.0-0.5 MG/3 ML] 3 ml INH QID 04/13/20 [History]


Acetaminophen [Tylenol] 650 mg PO Q4H PRN 07/25/20 [History]


Omeprazole 20 mg PO DAILY 03/17/21 [History]


Sertraline [Zoloft] 25 mg PO DAILY 03/17/21 [History]











Past Medical History


HEENT History: Reports: Hard of Hearing, Impaired Vision


Cardiovascular History: Reports: Afib, CAD, Hypertension, Other (See Below)


Other Cardiovascular History: ablation 2013


Respiratory History: Reports: COPD, Pneumonia, Recurrent, SOB


Gastrointestinal History: Reports: Other (See Below)


Other Gastrointestinal History: colitis


Genitourinary History: Reports: Other (See Below)


Other Genitourinary History: bladder infection x1


OB/GYN History: Reports: Pregnancy


Musculoskeletal History: Reports: Arthritis, Back Pain, Chronic


Other Musculoskeletal History: LAMINECTOMY 2017


Neurological History: Reports: Other (See Below)


Other Neuro History: abnormal vascular malformation repaired 2012


Psychiatric History: Reports: Depression


Endocrine/Metabolic History: Reports: None


Hematologic History: Reports: None


Immunologic History: Reports: None


Oncologic (Cancer) History: Reports: None


Dermatologic History: Reports: Eczema





- Infectious Disease History


Infectious Disease History: Reports: Chicken Pox, Measles





- Past Surgical History


Head Surgeries/Procedures: Reports: None


HEENT Surgical History: Reports: Tonsillectomy, Other (See Below)


Other HEENT Surgeries/Procedures: ear surgery


Cardiovascular Surgical History: Reports: Cardiac Ablation


Respiratory Surgical History: Reports: None


GI Surgical History: Reports: Colonoscopy


Female  Surgical History: Reports: Salpingo-Oophorectomy


Neurological Surgical History: Reports: Laminectomy


Musculoskeletal Surgical History: Reports: Hip Replacement, ORIF


Other Musculoskeletal Surgeries/Procedures:: right total hip,  orif right femur.

  left total hip.  LAMINECTOMY 2017


Dermatological Surgical History: Reports: Skin Biopsy





Social & Family History





- Family History


Family Medical History: No Pertinent Family History





- Tobacco Use


Tobacco Use Status *Q: Current Every Day Tobacco User


Years of Tobacco use: 50


Packs/Tins Daily: 0.1





- Caffeine Use


Caffeine Use: Reports: Coffee


Caffeine Use Comment: 2 cups/daily





- Recreational Drug Use


Recreational Drug Use: No





- Living Situation & Occupation


Living situation: Reports: 


Occupation: Retired (lives with , retired, in [place], [place]. has three 

grown children)





ED ROS GENERAL





- Review of Systems


Review Of Systems: Comprehensive ROS is negative, except as noted in HPI.


Constitutional: Reports: No Symptoms


HEENT: Reports: No Symptoms


Respiratory: Reports: Shortness of Breath, Wheezing


Cardiovascular: Reports: No Symptoms


Endocrine: Reports: No Symptoms


GI/Abdominal: Reports: No Symptoms


: Reports: No Symptoms


Musculoskeletal: Reports: No Symptoms


Skin: Reports: No Symptoms


Neurological: Reports: No Symptoms


Psychiatric: Reports: No Symptoms


Hematologic/Lymphatic: Reports: No Symptoms


Immunologic: Reports: No Symptoms





ED EXAM, GENERAL





- Physical Exam


Exam: See Below


Exam Limited By: No Limitations


General Appearance: Alert, WD/WN, Moderate Distress (respiratory)


Eye Exam: Bilateral Eye: EOMI, Normal Inspection, PERRL


Ears: Normal External Exam


Nose: Normal Inspection


Throat/Mouth: Normal Inspection, Normal Oropharynx, Normal Voice, No Airway 

Compromise


Head: Atraumatic, Normocephalic


Neck: Normal Inspection, Supple, Non-Tender, Full Range of Motion.  No: 

Lymphadenopathy (R), Lymphadenopathy (L)


Respiratory/Chest: Chest Non-Tender, Accessory Muscle Use, Other (decreased 

breath sounds bilaterally, poor air movement, tachypnea & dyspneic with 

conversation)


Cardiovascular: Normal Peripheral Pulses, No Edema, No Murmur, Tachycardia


Peripheral Pulses: 2+: Radial (L), Radial (R)


GI/Abdominal: Normal Bowel Sounds, Soft, Non-Tender


 (Female) Exam: Deferred


Rectal (Female) Exam: Deferred


Back Exam: Normal Inspection


Extremities: Normal Inspection, No Pedal Edema, Normal Capillary Refill


Neurological: Alert, Oriented, No Motor/Sensory Deficits


Psychiatric: Normal Affect, Normal Mood


Skin Exam: Warm, Dry, Intact, Normal Color


  ** #1 Interpretation


EKG Date: 06/15/21


Time: 23:37 (Read by physician at 00 40 there is no STEMI noted)


Rhythm: NSR


Rate (Beats/Min): 92


Axis: Normal


P-Wave: Present (TX interval 170)


QRS: Normal (URS 86)


ST-T: Normal


QT: Normal (QT/FJg250/429)


EKG Interpretation Comments: 





Normal sinus rhythm, normal EKG





Course





- Vital Signs


Text/Narrative:: 





0025--patient recheck after nebulizer, she is sleeping/resting comfortably, 

noted to be reclined, breathing relaxed/easy.  arouses easily, states that she 

feels markedly improved. Resp--lungs with improved air movement, noted on left 

side to have slight wheeze posteriorly.  p/o-94% on 2 L nc, will turn oxygen off

 as patient states not on home oxygen.  continue to monitor


0035--notified by ER nurse that patient pulse ox has dropped to 82% on room air 

oxygen 2 L nasal cannula has been replaced with quick return to above 90% oxygen

 saturation.  Labs have been reviewed noted for a mild white blood cell 

elevation with 78% neutrophils and 2% bands magnesium is creased at 1.5 and 

troponin is less than 0.017 is negative.  Chest film on preliminary reading is 

noted for some increased opacity in the left lower base this could be 

atelectasis versus early infiltrate given patient's history of recent rib 

fracture this may be a causative factor.  will need to admit for further 

treatment to include pulmonary toliet, pain control and IV ABX for possible 

early pneumonia, and oxygen therapy due to noted hypoxia.  d/w patient who 

verbalized understanding/agreement.  she is contacting her daughter-in-law to 

find out facility preference to try as no beds available at this facility


0114--call placed to [facility] after bed availability confirmed 

with RN supervisor she transferred me to ER physician.  case was d/w Dr Lieberman, 

ER who is pending acceptance after bed availability confirmed.  will call me 

back shortly


0125--recieved call back from Dr Lieberman, ER at [facility]--he accepts if 

negative COVID test otherwise unable to take patient if positive results return.

  


Last Recorded V/S: 


                                Last Vital Signs











Temp  97.6 F   06/16/21 02:02


 


Pulse  90   06/16/21 02:02


 


Resp  19   06/16/21 02:02


 


BP  136/81   06/16/21 02:02


 


Pulse Ox  92 L  06/16/21 02:02














- Orders/Labs/Meds


Orders: 


                               Active Orders 24 hr











 Category Date Time Status


 


 Chest 1V Frontal [CR] Urgent Exams  06/15/21 23:21 Taken


 


 Isolation [COMM] Stat Oth  06/16/21 01:32 Ordered


 


 Peripheral IV Insertion Adult [OM.PC] Urgent Oth  06/15/21 23:21 Ordered


 


 EKG 12 Lead [EK] Routine Ther  06/15/21 23:24 Ordered











Labs: 


                                Laboratory Tests











  06/15/21 06/15/21 06/15/21 Range/Units





  23:27 23:27 23:27 


 


WBC  11.0    (4.5-11.0)  K/uL


 


RBC  4.78    (3.30-5.50)  M/uL


 


Hgb  15.6 H    (12.0-15.0)  g/dL


 


Hct  47.0    (36.0-48.0)  %


 


MCV  98    (80-98)  fL


 


MCH  33 H    (27-31)  pg


 


MCHC  33    (32-36)  %


 


Plt Count  337    (150-400)  K/uL


 


Add Manual Diff  Yes    


 


Neutrophils % (Manual)  78 H    (36-66)  %


 


Band Neutrophils %  2 L    (5-11)  %


 


Lymphocytes % (Manual)  15 L    (24-44)  %


 


Monocytes % (Manual)  4    (2-6)  %


 


Eosinophils % (Manual)  1 L    (2-4)  %


 


Sodium   144   (140-148)  mmol/L


 


Potassium   3.6   (3.6-5.2)  mmol/L


 


Chloride   104   (100-108)  mmol/L


 


Carbon Dioxide   29   (21-32)  mmol/L


 


Anion Gap   14.6 H   (5.0-14.0)  mmol/L


 


BUN   15   (7-18)  mg/dL


 


Creatinine   0.7   (0.6-1.0)  mg/dL


 


Est Cr Clr Drug Dosing   72.10   mL/min


 


Estimated GFR (MDRD)   > 60   (>60)  


 


Glucose   81   ()  mg/dL


 


Calcium   8.6   (8.5-10.1)  mg/dL


 


Magnesium   1.5 L   (1.8-2.4)  mg/dL


 


Troponin I    < 0.017  (0.000-0.056)  ng/mL


 


SARS CoV-2 RNA Rapid LUCRETIA     














  06/16/21 Range/Units





  01:44 


 


WBC   (4.5-11.0)  K/uL


 


RBC   (3.30-5.50)  M/uL


 


Hgb   (12.0-15.0)  g/dL


 


Hct   (36.0-48.0)  %


 


MCV   (80-98)  fL


 


MCH   (27-31)  pg


 


MCHC   (32-36)  %


 


Plt Count   (150-400)  K/uL


 


Add Manual Diff   


 


Neutrophils % (Manual)   (36-66)  %


 


Band Neutrophils %   (5-11)  %


 


Lymphocytes % (Manual)   (24-44)  %


 


Monocytes % (Manual)   (2-6)  %


 


Eosinophils % (Manual)   (2-4)  %


 


Sodium   (140-148)  mmol/L


 


Potassium   (3.6-5.2)  mmol/L


 


Chloride   (100-108)  mmol/L


 


Carbon Dioxide   (21-32)  mmol/L


 


Anion Gap   (5.0-14.0)  mmol/L


 


BUN   (7-18)  mg/dL


 


Creatinine   (0.6-1.0)  mg/dL


 


Est Cr Clr Drug Dosing   mL/min


 


Estimated GFR (MDRD)   (>60)  


 


Glucose   ()  mg/dL


 


Calcium   (8.5-10.1)  mg/dL


 


Magnesium   (1.8-2.4)  mg/dL


 


Troponin I   (0.000-0.056)  ng/mL


 


SARS CoV-2 RNA Rapid LUCRETIA  Negative  











Meds: 


Medications














Discontinued Medications














Generic Name Dose Route Start Last Admin





  Trade Name Freq  PRN Reason Stop Dose Admin


 


Albuterol  2.5 mg  06/15/21 23:23  06/15/21 23:40





  Albuterol 0.083% 2.5 Mg/3 Ml Neb Soln  [facility]  06/15/21 23:24  2.5 mg





  ONETIME ONE   Administration


 


Albuterol/Ipratropium  3 ml  06/15/21 23:23  06/15/21 23:40





  Albuterol/Ipratropium 3.0-0.5 Mg/3 Ml Neb Soln  [facility]  06/15/21 23:24  3 ml





  ONETIME ONE   Administration


 


Ceftriaxone Sodium 1 gm/  50 mls @ 100 mls/hr  06/16/21 00:46  06/16/21 01:26





  Sodium Chloride  IV  06/16/21 01:15  100 mls/hr





  ONETIME ONE   Administration


 


Magnesium Sulfate 2 gm/ Premix  50 mls @ 25 mls/hr  06/16/21 01:18  06/16/21 

01:47





  IV  06/16/21 03:17  25 mls/hr





  ONETIME ONE   Administration


 


Methylprednisolone Sodium Succinate  125 mg  06/15/21 23:23  06/15/21 23:40





  Methylprednisolone Sodium Succinate 125 Mg/2 Ml Sdv  IVPUSH  06/15/21 23:24  

125 mg





  ONETIME ONE   Administration


 


Ondansetron HCl  4 mg  06/16/21 00:04  06/16/21 00:11





  Ondansetron 4 Mg/2 Ml Sdv  IVPUSH  06/16/21 00:05  4 mg





  ONETIME ONE   Administration


 


Sodium Chloride  10 ml  06/15/21 23:21  06/15/21 23:40





  Sodium Chloride 0.9% 10 Ml Syringe  FLUSH   10 ml





  ASDIRECTED PRN   Administration





  Keep Vein Open  














- Radiology Interpretation


Free Text/Narrative:: 





chest film-1V preliminary reading increased markings in left lower lobe 

suggestive of atelectesis vs early infiltrate; final radiology reading pending





Departure





- Departure


Time of Disposition: 02:45


Disposition: DC/Tfer to Acute Hospital 02


Condition: Good


Clinical Impression: 


 Hypoxia, COPD exacerbation, Hypomagnesemia





Pneumonia


Qualifiers:


 Pneumonia type: due to unspecified organism Laterality: unspecified laterality 

Lung location: unspecified part of lung Qualified Code(s): J18.9 - Pneumonia, 

unspecified organism








- Discharge Information


*PRESCRIPTION DRUG MONITORING PROGRAM REVIEWED*: Not Applicable


*COPY OF PRESCRIPTION DRUG MONITORING REPORT IN PATIENT CIRILO: Not Applicable


Referrals: 


PCP,None [Primary Care Provider] - 


Forms:  ED Department Discharge





Sepsis Event Note (ED)





- Evaluation


Sepsis Screening Result: No Definite Risk





- Focused Exam


Vital Signs: 


                                   Vital Signs











  Temp Pulse Resp BP Pulse Ox


 


 06/16/21 02:02  97.6 F  90  19  136/81  92 L


 


 06/16/21 00:14  98.3 F  97  19  146/84 H  90 L


 


 06/15/21 23:21  98.2 F  94  22 H  173/99 H  94 L


 


 06/15/21 23:19  98.2 F  94  22 H  173/99 H  94 L














- My Orders


Last 24 Hours: 


My Active Orders





06/15/21 23:21


Chest 1V Frontal [CR] Urgent 


Peripheral IV Insertion Adult [OM.PC] Urgent 





06/15/21 23:24


EKG 12 Lead [EK] Routine 





06/16/21 01:32


Isolation [COMM] Stat 














- Assessment/Plan


Last 24 Hours: 


My Active Orders





06/15/21 23:21


Chest 1V Frontal [CR] Urgent 


Peripheral IV Insertion Adult [OM.PC] Urgent 





06/15/21 23:24


EKG 12 Lead [EK] Routine 





06/16/21 01:32


Isolation [COMM] Stat

## 2021-06-17 NOTE — CR
CHEST: Portable 6/15/2021 11:41 PM

 

CLINICAL HISTORY:COPD exacerbation

 

COMPARISON:CT 6/11/2021

 

FINDINGS:  There is minimal patchy scarring and/or atelectasis in the lingula

and right middle lobe. This is similar to the CT appearance. Patient has a

slightly displaced right eighth rib fracture which is likely subacute. There is

also seen on the prior CT.

 

IMPRESSION: No acute cardiopulmonary process

 

Slightly displaced right eighth rib fracture

 

Patchy scarring and/or atelectasis at both lung bases

## 2021-11-25 ENCOUNTER — APPOINTMENT (OUTPATIENT)
Dept: GENERAL RADIOLOGY | Facility: CLINIC | Age: 66
DRG: 190 | End: 2021-11-25
Attending: EMERGENCY MEDICINE
Payer: COMMERCIAL

## 2021-11-25 ENCOUNTER — HOSPITAL ENCOUNTER (INPATIENT)
Facility: CLINIC | Age: 66
LOS: 2 days | Discharge: HOME | DRG: 190 | End: 2021-11-27
Attending: EMERGENCY MEDICINE | Admitting: INTERNAL MEDICINE
Payer: COMMERCIAL

## 2021-11-25 DIAGNOSIS — J44.1 COPD EXACERBATION (H): ICD-10-CM

## 2021-11-25 DIAGNOSIS — J96.21 ACUTE AND CHRONIC RESPIRATORY FAILURE WITH HYPOXIA (H): ICD-10-CM

## 2021-11-25 DIAGNOSIS — J44.1 ACUTE EXACERBATION OF CHRONIC OBSTRUCTIVE PULMONARY DISEASE (COPD) (H): Primary | ICD-10-CM

## 2021-11-25 LAB
ANION GAP SERPL CALCULATED.3IONS-SCNC: 2 MMOL/L (ref 3–14)
BASOPHILS # BLD AUTO: 0.1 10E3/UL (ref 0–0.2)
BASOPHILS NFR BLD AUTO: 1 %
BUN SERPL-MCNC: 14 MG/DL (ref 7–30)
CALCIUM SERPL-MCNC: 9.4 MG/DL (ref 8.5–10.1)
CHLORIDE BLD-SCNC: 106 MMOL/L (ref 94–109)
CO2 SERPL-SCNC: 32 MMOL/L (ref 20–32)
CREAT SERPL-MCNC: 0.52 MG/DL (ref 0.52–1.04)
EOSINOPHIL # BLD AUTO: 0.1 10E3/UL (ref 0–0.7)
EOSINOPHIL NFR BLD AUTO: 2 %
ERYTHROCYTE [DISTWIDTH] IN BLOOD BY AUTOMATED COUNT: 12.2 % (ref 10–15)
FLUAV RNA SPEC QL NAA+PROBE: NEGATIVE
FLUBV RNA RESP QL NAA+PROBE: NEGATIVE
GFR SERPL CREATININE-BSD FRML MDRD: >90 ML/MIN/1.73M2
GLUCOSE BLD-MCNC: 108 MG/DL (ref 70–99)
HCO3 BLDV-SCNC: 31 MMOL/L (ref 21–28)
HCT VFR BLD AUTO: 45.3 % (ref 35–47)
HGB BLD-MCNC: 14.5 G/DL (ref 11.7–15.7)
IMM GRANULOCYTES # BLD: 0 10E3/UL
IMM GRANULOCYTES NFR BLD: 0 %
LACTATE BLD-SCNC: 1 MMOL/L
LYMPHOCYTES # BLD AUTO: 2.6 10E3/UL (ref 0.8–5.3)
LYMPHOCYTES NFR BLD AUTO: 28 %
MAGNESIUM SERPL-MCNC: 1.9 MG/DL (ref 1.6–2.3)
MCH RBC QN AUTO: 33.1 PG (ref 26.5–33)
MCHC RBC AUTO-ENTMCNC: 32 G/DL (ref 31.5–36.5)
MCV RBC AUTO: 103 FL (ref 78–100)
MONOCYTES # BLD AUTO: 1.1 10E3/UL (ref 0–1.3)
MONOCYTES NFR BLD AUTO: 12 %
NEUTROPHILS # BLD AUTO: 5.1 10E3/UL (ref 1.6–8.3)
NEUTROPHILS NFR BLD AUTO: 57 %
NRBC # BLD AUTO: 0 10E3/UL
NRBC BLD AUTO-RTO: 0 /100
PCO2 BLDV: 59 MM HG (ref 40–50)
PH BLDV: 7.33 [PH] (ref 7.32–7.43)
PLATELET # BLD AUTO: 414 10E3/UL (ref 150–450)
PO2 BLDV: 31 MM HG (ref 25–47)
POTASSIUM BLD-SCNC: 3.9 MMOL/L (ref 3.4–5.3)
POTASSIUM BLD-SCNC: 4.2 MMOL/L (ref 3.4–5.3)
RBC # BLD AUTO: 4.38 10E6/UL (ref 3.8–5.2)
SAO2 % BLDV: 53 % (ref 94–100)
SARS-COV-2 RNA RESP QL NAA+PROBE: NEGATIVE
SODIUM SERPL-SCNC: 140 MMOL/L (ref 133–144)
TROPONIN I SERPL HS-MCNC: 10 NG/L
TROPONIN I SERPL-MCNC: <0.015 UG/L (ref 0–0.04)
WBC # BLD AUTO: 9 10E3/UL (ref 4–11)

## 2021-11-25 PROCEDURE — 80048 BASIC METABOLIC PNL TOTAL CA: CPT | Performed by: EMERGENCY MEDICINE

## 2021-11-25 PROCEDURE — C9803 HOPD COVID-19 SPEC COLLECT: HCPCS

## 2021-11-25 PROCEDURE — 250N000011 HC RX IP 250 OP 636: Performed by: EMERGENCY MEDICINE

## 2021-11-25 PROCEDURE — 71045 X-RAY EXAM CHEST 1 VIEW: CPT

## 2021-11-25 PROCEDURE — 87636 SARSCOV2 & INF A&B AMP PRB: CPT | Performed by: EMERGENCY MEDICINE

## 2021-11-25 PROCEDURE — 96365 THER/PROPH/DIAG IV INF INIT: CPT

## 2021-11-25 PROCEDURE — 99223 1ST HOSP IP/OBS HIGH 75: CPT | Mod: AI | Performed by: INTERNAL MEDICINE

## 2021-11-25 PROCEDURE — 36415 COLL VENOUS BLD VENIPUNCTURE: CPT | Performed by: EMERGENCY MEDICINE

## 2021-11-25 PROCEDURE — 83735 ASSAY OF MAGNESIUM: CPT | Performed by: INTERNAL MEDICINE

## 2021-11-25 PROCEDURE — 99285 EMERGENCY DEPT VISIT HI MDM: CPT | Mod: 25

## 2021-11-25 PROCEDURE — 94640 AIRWAY INHALATION TREATMENT: CPT

## 2021-11-25 PROCEDURE — 120N000001 HC R&B MED SURG/OB

## 2021-11-25 PROCEDURE — 96375 TX/PRO/DX INJ NEW DRUG ADDON: CPT

## 2021-11-25 PROCEDURE — 36415 COLL VENOUS BLD VENIPUNCTURE: CPT | Performed by: INTERNAL MEDICINE

## 2021-11-25 PROCEDURE — 93005 ELECTROCARDIOGRAM TRACING: CPT

## 2021-11-25 PROCEDURE — 82803 BLOOD GASES ANY COMBINATION: CPT

## 2021-11-25 PROCEDURE — 250N000009 HC RX 250: Performed by: EMERGENCY MEDICINE

## 2021-11-25 PROCEDURE — 250N000013 HC RX MED GY IP 250 OP 250 PS 637: Performed by: INTERNAL MEDICINE

## 2021-11-25 PROCEDURE — 84132 ASSAY OF SERUM POTASSIUM: CPT | Performed by: INTERNAL MEDICINE

## 2021-11-25 PROCEDURE — 85025 COMPLETE CBC W/AUTO DIFF WBC: CPT | Performed by: EMERGENCY MEDICINE

## 2021-11-25 PROCEDURE — 84484 ASSAY OF TROPONIN QUANT: CPT | Performed by: EMERGENCY MEDICINE

## 2021-11-25 PROCEDURE — 250N000009 HC RX 250

## 2021-11-25 RX ORDER — IPRATROPIUM BROMIDE AND ALBUTEROL SULFATE 2.5; .5 MG/3ML; MG/3ML
3 SOLUTION RESPIRATORY (INHALATION)
Status: DISCONTINUED | OUTPATIENT
Start: 2021-11-25 | End: 2021-11-27 | Stop reason: HOSPADM

## 2021-11-25 RX ORDER — IPRATROPIUM BROMIDE AND ALBUTEROL SULFATE 2.5; .5 MG/3ML; MG/3ML
3 SOLUTION RESPIRATORY (INHALATION) EVERY 6 HOURS PRN
COMMUNITY
Start: 2021-08-17

## 2021-11-25 RX ORDER — ONDANSETRON 2 MG/ML
4 INJECTION INTRAMUSCULAR; INTRAVENOUS EVERY 6 HOURS PRN
Status: DISCONTINUED | OUTPATIENT
Start: 2021-11-25 | End: 2021-11-27 | Stop reason: HOSPADM

## 2021-11-25 RX ORDER — ALBUTEROL SULFATE 0.83 MG/ML
2.5 SOLUTION RESPIRATORY (INHALATION) ONCE
Status: COMPLETED | OUTPATIENT
Start: 2021-11-25 | End: 2021-11-25

## 2021-11-25 RX ORDER — ALBUTEROL SULFATE 0.83 MG/ML
2.5 SOLUTION RESPIRATORY (INHALATION)
Status: DISCONTINUED | OUTPATIENT
Start: 2021-11-25 | End: 2021-11-27 | Stop reason: HOSPADM

## 2021-11-25 RX ORDER — DOXYCYCLINE 100 MG/1
100 CAPSULE ORAL 2 TIMES DAILY
Status: DISCONTINUED | OUTPATIENT
Start: 2021-11-25 | End: 2021-11-27 | Stop reason: HOSPADM

## 2021-11-25 RX ORDER — AZITHROMYCIN 250 MG/1
250 TABLET, FILM COATED ORAL
Status: ON HOLD | COMMUNITY
Start: 2021-11-10 | End: 2021-11-27

## 2021-11-25 RX ORDER — ACETAMINOPHEN 650 MG/1
650 SUPPOSITORY RECTAL EVERY 6 HOURS PRN
Status: DISCONTINUED | OUTPATIENT
Start: 2021-11-25 | End: 2021-11-27 | Stop reason: HOSPADM

## 2021-11-25 RX ORDER — ACETAMINOPHEN 325 MG/1
650 TABLET ORAL EVERY 6 HOURS PRN
Status: DISCONTINUED | OUTPATIENT
Start: 2021-11-25 | End: 2021-11-27 | Stop reason: HOSPADM

## 2021-11-25 RX ORDER — ONDANSETRON 4 MG/1
4 TABLET, ORALLY DISINTEGRATING ORAL EVERY 6 HOURS PRN
Status: DISCONTINUED | OUTPATIENT
Start: 2021-11-25 | End: 2021-11-27 | Stop reason: HOSPADM

## 2021-11-25 RX ORDER — METHYLPREDNISOLONE SODIUM SUCCINATE 125 MG/2ML
125 INJECTION, POWDER, LYOPHILIZED, FOR SOLUTION INTRAMUSCULAR; INTRAVENOUS ONCE
Status: COMPLETED | OUTPATIENT
Start: 2021-11-25 | End: 2021-11-25

## 2021-11-25 RX ORDER — PROCHLORPERAZINE MALEATE 5 MG
5 TABLET ORAL EVERY 6 HOURS PRN
Status: DISCONTINUED | OUTPATIENT
Start: 2021-11-25 | End: 2021-11-27 | Stop reason: HOSPADM

## 2021-11-25 RX ORDER — IPRATROPIUM BROMIDE AND ALBUTEROL SULFATE 2.5; .5 MG/3ML; MG/3ML
SOLUTION RESPIRATORY (INHALATION)
Status: COMPLETED
Start: 2021-11-25 | End: 2021-11-25

## 2021-11-25 RX ORDER — SERTRALINE HYDROCHLORIDE 25 MG/1
25 TABLET, FILM COATED ORAL DAILY
Status: ON HOLD | COMMUNITY
Start: 2021-11-01 | End: 2023-11-02

## 2021-11-25 RX ORDER — METOPROLOL SUCCINATE 50 MG/1
50 TABLET, EXTENDED RELEASE ORAL DAILY
COMMUNITY
Start: 2021-11-09

## 2021-11-25 RX ORDER — PROCHLORPERAZINE 25 MG
12.5 SUPPOSITORY, RECTAL RECTAL EVERY 12 HOURS PRN
Status: DISCONTINUED | OUTPATIENT
Start: 2021-11-25 | End: 2021-11-27 | Stop reason: HOSPADM

## 2021-11-25 RX ORDER — METHYLPREDNISOLONE SODIUM SUCCINATE 40 MG/ML
40 INJECTION, POWDER, LYOPHILIZED, FOR SOLUTION INTRAMUSCULAR; INTRAVENOUS EVERY 12 HOURS
Status: DISCONTINUED | OUTPATIENT
Start: 2021-11-26 | End: 2021-11-26

## 2021-11-25 RX ORDER — DOXYCYCLINE 100 MG/1
100 CAPSULE ORAL ONCE
Status: DISCONTINUED | OUTPATIENT
Start: 2021-11-25 | End: 2021-11-25

## 2021-11-25 RX ORDER — LIDOCAINE 40 MG/G
CREAM TOPICAL
Status: DISCONTINUED | OUTPATIENT
Start: 2021-11-25 | End: 2021-11-27 | Stop reason: HOSPADM

## 2021-11-25 RX ORDER — MULTIPLE VITAMINS W/ MINERALS TAB 9MG-400MCG
1 TAB ORAL
Status: ON HOLD | COMMUNITY
End: 2023-11-02

## 2021-11-25 RX ORDER — IPRATROPIUM BROMIDE AND ALBUTEROL SULFATE 2.5; .5 MG/3ML; MG/3ML
6 SOLUTION RESPIRATORY (INHALATION) ONCE
Status: COMPLETED | OUTPATIENT
Start: 2021-11-25 | End: 2021-11-25

## 2021-11-25 RX ORDER — IPRATROPIUM BROMIDE AND ALBUTEROL 20; 100 UG/1; UG/1
1 SPRAY, METERED RESPIRATORY (INHALATION) 4 TIMES DAILY PRN
COMMUNITY
Start: 2021-09-23 | End: 2024-10-04

## 2021-11-25 RX ORDER — MAGNESIUM SULFATE HEPTAHYDRATE 40 MG/ML
2 INJECTION, SOLUTION INTRAVENOUS ONCE
Status: COMPLETED | OUTPATIENT
Start: 2021-11-25 | End: 2021-11-25

## 2021-11-25 RX ADMIN — ALBUTEROL SULFATE 2.5 MG: 2.5 SOLUTION RESPIRATORY (INHALATION) at 20:11

## 2021-11-25 RX ADMIN — IPRATROPIUM BROMIDE AND ALBUTEROL SULFATE 6 ML: 2.5; .5 SOLUTION RESPIRATORY (INHALATION) at 18:18

## 2021-11-25 RX ADMIN — METHYLPREDNISOLONE SODIUM SUCCINATE 125 MG: 125 INJECTION, POWDER, FOR SOLUTION INTRAMUSCULAR; INTRAVENOUS at 18:29

## 2021-11-25 RX ADMIN — ALBUTEROL SULFATE 2.5 MG: 2.5 SOLUTION RESPIRATORY (INHALATION) at 18:41

## 2021-11-25 RX ADMIN — DOXYCYCLINE HYCLATE 100 MG: 100 CAPSULE ORAL at 21:52

## 2021-11-25 RX ADMIN — IPRATROPIUM BROMIDE AND ALBUTEROL SULFATE 6 ML: .5; 3 SOLUTION RESPIRATORY (INHALATION) at 18:18

## 2021-11-25 RX ADMIN — MAGNESIUM SULFATE HEPTAHYDRATE 2 G: 2 INJECTION, SOLUTION INTRAVENOUS at 18:31

## 2021-11-25 ASSESSMENT — ACTIVITIES OF DAILY LIVING (ADL)
ADLS_ACUITY_SCORE: 12
ADLS_ACUITY_SCORE: 6
ADLS_ACUITY_SCORE: 12
ADLS_ACUITY_SCORE: 12

## 2021-11-25 ASSESSMENT — MIFFLIN-ST. JEOR: SCORE: 1302.47

## 2021-11-26 LAB
ANION GAP SERPL CALCULATED.3IONS-SCNC: 4 MMOL/L (ref 3–14)
ATRIAL RATE - MUSE: 77 BPM
BUN SERPL-MCNC: 12 MG/DL (ref 7–30)
CALCIUM SERPL-MCNC: 9.1 MG/DL (ref 8.5–10.1)
CHLORIDE BLD-SCNC: 108 MMOL/L (ref 94–109)
CO2 SERPL-SCNC: 29 MMOL/L (ref 20–32)
CREAT SERPL-MCNC: 0.48 MG/DL (ref 0.52–1.04)
DIASTOLIC BLOOD PRESSURE - MUSE: NORMAL MMHG
ERYTHROCYTE [DISTWIDTH] IN BLOOD BY AUTOMATED COUNT: 12.1 % (ref 10–15)
GFR SERPL CREATININE-BSD FRML MDRD: >90 ML/MIN/1.73M2
GLUCOSE BLD-MCNC: 143 MG/DL (ref 70–99)
HCT VFR BLD AUTO: 42.9 % (ref 35–47)
HGB BLD-MCNC: 14 G/DL (ref 11.7–15.7)
INTERPRETATION ECG - MUSE: NORMAL
MAGNESIUM SERPL-MCNC: 2.2 MG/DL (ref 1.6–2.3)
MCH RBC QN AUTO: 33.3 PG (ref 26.5–33)
MCHC RBC AUTO-ENTMCNC: 32.6 G/DL (ref 31.5–36.5)
MCV RBC AUTO: 102 FL (ref 78–100)
P AXIS - MUSE: 65 DEGREES
PLATELET # BLD AUTO: 385 10E3/UL (ref 150–450)
POTASSIUM BLD-SCNC: 5.1 MMOL/L (ref 3.4–5.3)
PR INTERVAL - MUSE: 172 MS
QRS DURATION - MUSE: 80 MS
QT - MUSE: 382 MS
QTC - MUSE: 432 MS
R AXIS - MUSE: -8 DEGREES
RBC # BLD AUTO: 4.2 10E6/UL (ref 3.8–5.2)
SODIUM SERPL-SCNC: 141 MMOL/L (ref 133–144)
SYSTOLIC BLOOD PRESSURE - MUSE: NORMAL MMHG
T AXIS - MUSE: 60 DEGREES
VENTRICULAR RATE- MUSE: 77 BPM
WBC # BLD AUTO: 3.9 10E3/UL (ref 4–11)

## 2021-11-26 PROCEDURE — 99232 SBSQ HOSP IP/OBS MODERATE 35: CPT | Performed by: INTERNAL MEDICINE

## 2021-11-26 PROCEDURE — 36415 COLL VENOUS BLD VENIPUNCTURE: CPT | Performed by: INTERNAL MEDICINE

## 2021-11-26 PROCEDURE — 250N000012 HC RX MED GY IP 250 OP 636 PS 637: Performed by: INTERNAL MEDICINE

## 2021-11-26 PROCEDURE — 250N000013 HC RX MED GY IP 250 OP 250 PS 637: Performed by: INTERNAL MEDICINE

## 2021-11-26 PROCEDURE — 94640 AIRWAY INHALATION TREATMENT: CPT

## 2021-11-26 PROCEDURE — 80048 BASIC METABOLIC PNL TOTAL CA: CPT | Performed by: INTERNAL MEDICINE

## 2021-11-26 PROCEDURE — 250N000009 HC RX 250: Performed by: INTERNAL MEDICINE

## 2021-11-26 PROCEDURE — 94640 AIRWAY INHALATION TREATMENT: CPT | Mod: 76

## 2021-11-26 PROCEDURE — 85014 HEMATOCRIT: CPT | Performed by: INTERNAL MEDICINE

## 2021-11-26 PROCEDURE — 83735 ASSAY OF MAGNESIUM: CPT | Performed by: INTERNAL MEDICINE

## 2021-11-26 PROCEDURE — 999N000157 HC STATISTIC RCP TIME EA 10 MIN

## 2021-11-26 PROCEDURE — 120N000001 HC R&B MED SURG/OB

## 2021-11-26 PROCEDURE — 250N000011 HC RX IP 250 OP 636: Performed by: INTERNAL MEDICINE

## 2021-11-26 RX ORDER — METOPROLOL SUCCINATE 50 MG/1
50 TABLET, EXTENDED RELEASE ORAL
Status: DISCONTINUED | OUTPATIENT
Start: 2021-11-26 | End: 2021-11-27 | Stop reason: HOSPADM

## 2021-11-26 RX ORDER — PRENATAL VIT 91/IRON/FOLIC/DHA 28-975-200
COMBINATION PACKAGE (EA) ORAL 2 TIMES DAILY
Status: DISCONTINUED | OUTPATIENT
Start: 2021-11-26 | End: 2021-11-27 | Stop reason: HOSPADM

## 2021-11-26 RX ORDER — PRENATAL VIT 91/IRON/FOLIC/DHA 28-975-200
COMBINATION PACKAGE (EA) ORAL 2 TIMES DAILY
Status: DISCONTINUED | OUTPATIENT
Start: 2021-11-26 | End: 2021-11-26

## 2021-11-26 RX ORDER — SERTRALINE HYDROCHLORIDE 25 MG/1
25 TABLET, FILM COATED ORAL DAILY
Status: DISCONTINUED | OUTPATIENT
Start: 2021-11-26 | End: 2021-11-27 | Stop reason: HOSPADM

## 2021-11-26 RX ORDER — AZITHROMYCIN 250 MG/1
250 TABLET, FILM COATED ORAL
Status: DISCONTINUED | OUTPATIENT
Start: 2021-11-29 | End: 2021-11-26

## 2021-11-26 RX ORDER — PREDNISONE 20 MG/1
40 TABLET ORAL DAILY
Status: DISCONTINUED | OUTPATIENT
Start: 2021-11-26 | End: 2021-11-27 | Stop reason: HOSPADM

## 2021-11-26 RX ORDER — ASPIRIN 81 MG/1
81 TABLET ORAL DAILY
Status: DISCONTINUED | OUTPATIENT
Start: 2021-11-26 | End: 2021-11-27 | Stop reason: HOSPADM

## 2021-11-26 RX ADMIN — IPRATROPIUM BROMIDE AND ALBUTEROL SULFATE 3 ML: 2.5; .5 SOLUTION RESPIRATORY (INHALATION) at 16:04

## 2021-11-26 RX ADMIN — IPRATROPIUM BROMIDE AND ALBUTEROL SULFATE 3 ML: 2.5; .5 SOLUTION RESPIRATORY (INHALATION) at 01:15

## 2021-11-26 RX ADMIN — PREDNISONE 40 MG: 20 TABLET ORAL at 15:59

## 2021-11-26 RX ADMIN — TERBINAFINE HYDROCHLORIDE: 1 CREAM TOPICAL at 20:53

## 2021-11-26 RX ADMIN — IPRATROPIUM BROMIDE AND ALBUTEROL SULFATE 3 ML: 2.5; .5 SOLUTION RESPIRATORY (INHALATION) at 08:18

## 2021-11-26 RX ADMIN — METOPROLOL SUCCINATE 50 MG: 50 TABLET, EXTENDED RELEASE ORAL at 16:03

## 2021-11-26 RX ADMIN — ACETAMINOPHEN 650 MG: 325 TABLET, FILM COATED ORAL at 03:46

## 2021-11-26 RX ADMIN — METHYLPREDNISOLONE SODIUM SUCCINATE 40 MG: 40 INJECTION, POWDER, FOR SOLUTION INTRAMUSCULAR; INTRAVENOUS at 06:27

## 2021-11-26 RX ADMIN — IPRATROPIUM BROMIDE AND ALBUTEROL SULFATE 3 ML: 2.5; .5 SOLUTION RESPIRATORY (INHALATION) at 19:51

## 2021-11-26 RX ADMIN — DOXYCYCLINE HYCLATE 100 MG: 100 CAPSULE ORAL at 08:46

## 2021-11-26 RX ADMIN — FLUTICASONE FUROATE AND VILANTEROL TRIFENATATE 1 PUFF: 100; 25 POWDER RESPIRATORY (INHALATION) at 18:57

## 2021-11-26 RX ADMIN — ACETAMINOPHEN 650 MG: 325 TABLET, FILM COATED ORAL at 18:56

## 2021-11-26 RX ADMIN — SERTRALINE HYDROCHLORIDE 25 MG: 25 TABLET ORAL at 15:59

## 2021-11-26 RX ADMIN — UMECLIDINIUM 1 PUFF: 62.5 AEROSOL, POWDER ORAL at 18:57

## 2021-11-26 RX ADMIN — Medication 1 TABLET: at 16:00

## 2021-11-26 RX ADMIN — ASPIRIN 81 MG: 81 TABLET, COATED ORAL at 15:59

## 2021-11-26 RX ADMIN — DOXYCYCLINE HYCLATE 100 MG: 100 CAPSULE ORAL at 20:53

## 2021-11-26 RX ADMIN — ACETAMINOPHEN 650 MG: 325 TABLET, FILM COATED ORAL at 09:57

## 2021-11-26 RX ADMIN — TERBINAFINE HYDROCHLORIDE: 1 CREAM TOPICAL at 13:31

## 2021-11-26 ASSESSMENT — ACTIVITIES OF DAILY LIVING (ADL)
ADLS_ACUITY_SCORE: 6
DEPENDENT_IADLS:: INDEPENDENT
ADLS_ACUITY_SCORE: 6

## 2021-11-26 NOTE — PLAN OF CARE
End of Shift Summary  For vital signs and complete assessments, please see documentation flowsheets.     Pertinent assessments: Pt A+O x 4.  Up independently- ambulating to BR.  /90- MD to order home BP meds- recheck 128/73 without intervention.  AFVSS.  On 1LNC (93% at rest).  Pain 3-5/10 in left head and neck- tylenol give x 1 and heat applied with improvement.  LS diminished with fine crackles in bases.  Denies SOB at rest, but does exhibit [place].  Infrequent, nonproductive cough.  Denies nausea.  Good appetite on regular diet.  BM 11/15.  Voiding without difficulty per pt report.    Major Shift Events Uneventful.    Treatment Plan: Doxycycline, IV solu-medrol, and dounebs.  Lamisil to bilateral feet.     Bedside Nurse: Karen Pate RN

## 2021-11-26 NOTE — PROGRESS NOTES
[facility]  Hospitalist Progress Note    Santy Washington MD 11/26/2021  Text Page      Reason for Stay (Diagnosis): COPD exacerbation         Assessment and Plan:      Summary of Stay: Pooja Trinidad is a 66 year old female admitted on 11/25/2021 with acute exacerbation of COPD.  Past medical history significant for COPD, depression, hypertension, paroxysmal atrial fibrillation, intracerebral hemorrhage, anxiety and hypertension.    Problem List:   1.  Acute aspiration of COPD with acute hypoxic respiratory failure:  --She presented with a few days of shortness of breath and cough.  --She typically uses oxygen only at night.  --Oxygen saturation of 88% in the emergency department and she was put on supplemental oxygen.  She is on 2 L currently.  Wean as able.  --She is feeling better.  --Switch from Solu-Medrol to prednisone 40 mg a day to complete 4 more days.  --Complete 5 days of doxycycline.  She is chronically on azithromycin Monday, Wednesday and Friday.  We will hold this and can resume after discharge.  --Continue with duo nebs every 6 hours and as needed albuterol.  --Continue Trelegy inhaler    2.  Atrial fibrillation:  --Resume metoprolol  --On aspirin only for anticoagulation.  Resume.    3.  Depression:  --Resume Zoloft    4.  Tinea pedis bilaterally:  --Lamisil      DVT Prophylaxis: Pneumatic Compression Devices  Lines:  Eliseo:  Code Status: Full Code  Disposition Plan   Expected discharge in 1-2 days to prior living arrangement once after near baseline respiratory status and weaned off of supplemental oxygen during the day.     Entered: Santy Washington 11/26/2021, 3:16 PM     Incidental Findings/ Discharge Issues:            Interval History (Subjective):      Feeling quite a bit better.  Breathing is improved.                  Physical Exam:      Last Vital Signs:  /73 (BP Location: Left arm)   Pulse 91   Temp 97.8  F (36.6  C) (Oral)   Resp 20   Ht 1.651 m  "(5' 5\")   Wt 76.2 kg (167 lb 14.4 oz)   SpO2 95%   BMI 27.94 kg/m  On 2 L of oxygen      Vital signs reviewed  General:  Alert, calm, NAD  CV: regular rate and rhythm, no murmurs or rubs  Lungs:  Clear to ascultation bilaterally, normal respiratory effort  HEENT:  Pupil round, equal, conjuctivae, sclerae and lids normal, neck is supple  Abdomen:  Soft, nontender, nondistended, no masses, normal bowel sounds  Extremities:  No edema  Neuro: normal strength and sensation in all 4 extremities, cranial nerves grossly intact  Psychiatric:  Mood and affect within normal limits             Medications:      All current medications were reviewed with changes reflected in problem list.         Data:      All new lab and imaging data was reviewed.   Labs:  WBC 3.9  Glucose 143  "

## 2021-11-26 NOTE — H&P
[facility]    Hospitalist Admission Note    Name: Pooja Trinidad    MRN: [id number]  YOB: 1955    Age: 66 year old  Date of admission: 11/25/2021  Primary care provider: Nino Mac           Assessment:       Brief summary of admission assessment:    Pooja Trinidad is a 66 year old  female with a significant past medical history of chronic obstructive pulmonary disease, MDD, hypertension , PAF who presents with worsening of sob.    ED evaluation revealed  BP of 170/82, HR 88, Sat 89-93% afebrile.    Interventions in ED   1818 Duoneb 6 mL Nebulization  1829 Solu-medrol 125 mg V   1831 Magnesium sulfate 2 g IV   1841 Albuterol 2.5 mg Nebulization     Admission diagnoses:       #1.Acute COPD exacerbation   -- Increased sob , wheezing  , sats 88-93% at home     #2.COPD -Severe COPD. Gold 3D   -- uses nocturnal oxygen 2 LPM   -- continues to smoke cigarettes but down to only 2 a day   -- a 50 pk/yr smoking history and a diagnosis of COPD (FEV1 32%) last hospitalized in 9/2021 with an exacerbation and atrial fib with RAPID VENTRICULAR RESPONSE now in sinus not anticoagulated due to head bleed history        Chronic medical conditions:    Acute vestibular neuronitis     Collagenous colitis     Degeneration of lumbar intervertebral disc     Generalized anxiety disorder     Hypertension, essential     Major depressive disorder, recurrent episode, moderate     Osteoarthritis of both hips     Paroxysmal atrial fibrillation (HCC)     h/o Cerebral hemorrhage (HCC)     Cerebral arteriovenous malformation (AVM)     Cerebral hemorrhage (HCC)       COVID-19 Testing : negative   COVID -19 Vaccination Status : vaccinated        Plan/MDM:       # Admission Status: Will admit patient to hospitalist service as inpatient as patient likely need over two mid night stay  in the hospital.     # Care plan:      Admit to medical floor , continue nebs , steroids and  supplemental oxygen as needed     Med rec     # Supportive care:Oxygen continued  Respiratory therapy    # Diet:Diet advanced    # Activity:Advance activity as tolerated    # Education/Counseling :Discussed treatment plan with the patient    # Consults:none     # VTE prophylactic measures:prophylaxis against venous thromboembolism with PCD     # Therapies:Respiratory therapy      # Additional orders:    --Care plan discussed with the patient/family and agreed to care plan   --Patient will be transferred to care of hospitalist attending for further evaluation and management as appropriate   --Old medical orders reviewed   --imaging result independently reviewed by me     (See orders placed for this visit by me )     - Home medication reviewed and will be continued as appropriate once pharmacy reconciliation is completed         Code Status/Disposition:     # Code Status:Full Code      # Disposition:anticipate discharge to home and Anticipate discharge in 2-3 days        Disclaimer: This note consists of symbols derived from keyboarding, dictation and/or voice recognition software. As a result, there may be errors in the script that have gone undetected. Please consider this when interpreting information found in this chart.             Chief Complaint:       Sob      History is obtained from the patient          History of Present Illness:      This patient is a 66 year old  female with a significant past medical history of chronic obstructive pulmonary disease who presents with the following condition requiring a hospital admission:      Acute COPD exacerbation     Pooja Trinidad is a 66 year old female with history of COPD who presents for evaluation of shortness of breath.      History of COPD normally wears 2 litres of  oxygen at night but has had worsening shortness of breath and cough over the last day.  No fever body aches or sore throat.  Has been using oxygen throughout the day.  Has been compliant  with her chronic pulmonary medications including her 4 times a day duo nebs.  No chest pain, no vomiting, no diarrhea melena or hematochezia.  No bladder changes.  Has been vaccinated against Covid including a booster.     Recently saw pulmonologist through the [facility] system.     Hospitalized at [facility] in September for 5 days related to a COPD exacerbation.  Has never been intubated for a COPD exacerbation         Past Medical History:     Acute vestibular neuronitis     Collagenous colitis     Degeneration of lumbar intervertebral disc     Generalized anxiety disorder     Hypertension, essential     Major depressive disorder, recurrent episode, moderate     Osteoarthritis of both hips     Paroxysmal atrial fibrillation (HCC)     h/o Cerebral hemorrhage (HCC)     Cerebral arteriovenous malformation (AVM)     Cerebral hemorrhage (HCC)          Past Surgical History:     Past Surgical History:   Procedure Laterality Date     IR CAROTID ANGIOGRAM  3/5/2012     IR CAROTID ANGIOGRAM  3/5/2012     IR CAROTID ANGIOGRAM  3/6/2012     IR MISCELLANEOUS PROCEDURE  3/5/2012     IR MISCELLANEOUS PROCEDURE  3/5/2012     IR MISCELLANEOUS PROCEDURE  3/6/2012     IR MISCELLANEOUS PROCEDURE  3/6/2012             Social History:     Social History     Tobacco Use     Smoking status: Not on file     Smokeless tobacco: Not on file   Substance Use Topics     Alcohol use: Not on file             Family History:   Reviewed and non contributory        Allergies:     Allergies   Allergen Reactions     Nickel Rash     Contact dermatitis   Contact dermatitis   Contact dermatitis   Contact dermatitis                Medications:   albuterol (PROVENTIL) 0.083 % neb solution Inhale by mouth.     albuterol-ipratropium (DUONEB) (2.5-0.5 mg) in 3 mL NEBULIZATION solution Inhale 3 mL by mouth 4 times daily if needed.     aspirin (ECOTRIN) 81 mg enteric coated tablet Take 81 mg by mouth once daily.     aspirin-caffeine 500-32.5 mg  tab TAKE 2 TABLETS BY MOUTH TWI CE A DAY AS NEEDED (MOD.QUAN N 4-6)     benzonatate (TESSALON) 100 mg capsule Take 100 mg by mouth 2 times daily if needed.     calcium carbonate-vit D3, 600 mg-400 units, (CALTRATE PLUS 600 MG-400 UNIT TABLET) tablet Take 1 Tablet by mouth once daily with a meal.     cyclobenzaprine (FLEXERIL) 10 mg tablet Take 10 mg by mouth 3 times daily if needed.     fluticasone furoate-vilanteroL (Breo Ellipta) 100-25 mcg/dose inhaler Inhale 1 Puff by mouth once daily.     ipratropium-albuteroL (combivent respimat) ( mcg each actuation) mist inhaler INHALE 1 PUFF BY MOUTH 4 TIMES DAILY     metoprolol succinate (TOPROL XL) 50 mg sustained-release tablet Take 50 mg by mouth once daily.     Multivits,Ca,Minerals-Iron-FA 9 mg iron-400 mcg tablet Take 1 Tablet by mouth once daily.     sertraline (ZOLOFT) 25 mg tablet Take 25 mg by mouth once daily.           Review of Systems:     A Comprehensive greater than 10 system review of systems was carried out.  Pertinent positives and negatives are noted above in HPI.  Otherwise negative for contributory information.           Physical Exam:     Vital signs were reviewed    Pulse:  [88] 88  BP: (170)/(82) 170/82  SpO2:  [91 %-93 %] 93 %        GEN: awake, alert, cooperative, no apparent distress, oriented x 3    NECK:Supple ,no mass or thyromegaly     HEENT:  Normocephalic/atraumatic, no scleral icterus, no nasal discharge, mouth moist.    CV:  Regular rate and rhythm, no murmur or JVD.  S1 + S2 noted, no S3 or S4.    LUNGS:  Mild distress , scanty wheezing diffusely   ABD:  Active bowel sounds, soft, non-tender/non-distended.  No rebound/guarding/rigidity.    EXT:  No edema.  No cyanosis.  No joint synovitis noted.Lower extremity pulses are normal bilaterally and     LGS: No cervical or axillary lymphadenopathy     SKIN:  Dry to touch, warm ,no exanthems noted in the visualized areas.    Neurologic:Grossly intact,non focal . No acute focal neurologic  deficit     Psychaitric exam: Mood and affect normal              Data:       All laboratory and imaging data in the past 24 hours reviewed     Results for orders placed or performed during the hospital encounter of 11/25/21   XR Chest Port 1 View     Status: None    Narrative    EXAM: XR CHEST PORT 1 VIEW  LOCATION: [facility]  DATE/TIME: 11/25/2021 6:21 PM    INDICATION: Cough  COMPARISON: Chest x-ray 03/05/2012      Impression    IMPRESSION: Minimal right basilar atelectasis versus scarring. 10 mm nodular opacity at the left lung base, likely a prominent nipple shadow. 2 cm asymmetric opacity within the right mid lung, perhaps developing infiltrates. However, an intrapulmonary   lesion cannot be excluded. At least short interval x-ray follow-up to assess for resolution is recommended. No pleural effusion. Normal heart size.   Basic metabolic panel     Status: Abnormal   Result Value Ref Range    Sodium 140 133 - 144 mmol/L    Potassium 4.2 3.4 - 5.3 mmol/L    Chloride 106 94 - 109 mmol/L    Carbon Dioxide (CO2) 32 20 - 32 mmol/L    Anion Gap 2 (L) 3 - 14 mmol/L    Urea Nitrogen 14 7 - 30 mg/dL    Creatinine 0.52 0.52 - 1.04 mg/dL    Calcium 9.4 8.5 - 10.1 mg/dL    Glucose 108 (H) 70 - 99 mg/dL    GFR Estimate >90 >60 mL/min/1.73m2   Troponin I     Status: Normal   Result Value Ref Range    Troponin I High Sensitivity 10 <54 ng/L   Symptomatic Influenza A/B & SARS-CoV2 (COVID-19) Virus PCR Multiplex Nasopharyngeal     Status: Normal    Specimen: Nasopharyngeal; Swab   Result Value Ref Range    Influenza A PCR Negative Negative    Influenza B PCR Negative Negative    SARS CoV2 PCR Negative Negative    Narrative    Testing was performed using the joni SARS-CoV-2 & Influenza A/B Assay on the joni Melina System. This test should be ordered for the detection of SARS-CoV-2 and influenza viruses in individuals who meet clinical and/or epidemiological criteria. Test performance is unknown in  asymptomatic patients. This test is for in vitro diagnostic use under the FDA EUA for laboratories certified under CLIA to perform moderate and/or high complexity testing. This test has not been FDA cleared or approved. A negative result does not rule out the presence of PCR inhibitors in the specimen or target RNA in concentration below the limit of detection for the assay. If only one viral target is positive but coinfection with multiple targets is suspected, the sample should be re-tested with another FDA cleared, approved or authorized test, if coinfection would change clinical management. [facility] are certified under the Clinical Laboratory Improvement Amendments of 1988 (CLIA-88) as  qualified to perform moderate and/or high complexity laboratory testing.   CBC with platelets and differential     Status: Abnormal   Result Value Ref Range    WBC Count 9.0 4.0 - 11.0 10e3/uL    RBC Count 4.38 3.80 - 5.20 10e6/uL    Hemoglobin 14.5 11.7 - 15.7 g/dL    Hematocrit 45.3 35.0 - 47.0 %     (H) 78 - 100 fL    MCH 33.1 (H) 26.5 - 33.0 pg    MCHC 32.0 31.5 - 36.5 g/dL    RDW 12.2 10.0 - 15.0 %    Platelet Count 414 150 - 450 10e3/uL    % Neutrophils 57 %    % Lymphocytes 28 %    % Monocytes 12 %    % Eosinophils 2 %    % Basophils 1 %    % Immature Granulocytes 0 %    NRBCs per 100 WBC 0 <1 /100    Absolute Neutrophils 5.1 1.6 - 8.3 10e3/uL    Absolute Lymphocytes 2.6 0.8 - 5.3 10e3/uL    Absolute Monocytes 1.1 0.0 - 1.3 10e3/uL    Absolute Eosinophils 0.1 0.0 - 0.7 10e3/uL    Absolute Basophils 0.1 0.0 - 0.2 10e3/uL    Absolute Immature Granulocytes 0.0 <=0.0 10e3/uL    Absolute NRBCs 0.0 10e3/uL   iStat Gases (lactate) venous, POCT     Status: Abnormal   Result Value Ref Range    Lactic Acid POCT 1.0 <=2.0 mmol/L    Bicarbonate Venous POCT 31 (H) 21 - 28 mmol/L    O2 Sat, Venous POCT 53 (L) 94 - 100 %    pCO2V Venous POCT 59 (H) 40 - 50 mm Hg    pH Venous POCT 7.33 7.32 - 7.43    pO2  Venous POCT 31 25 - 47 mm Hg   Troponin I     Status: Normal   Result Value Ref Range    Troponin I <0.015 0.000 - 0.045 ug/L   EKG 12-lead, tracing only     Status: None (Preliminary result)   Result Value Ref Range    Systolic Blood Pressure  mmHg    Diastolic Blood Pressure  mmHg    Ventricular Rate 77 BPM    Atrial Rate 77 BPM    DE Interval 172 ms    QRS Duration 80 ms     ms    QTc 432 ms    P Axis 65 degrees    R AXIS -8 degrees    T Axis 60 degrees    Interpretation ECG       Sinus rhythm  Normal ECG  When compared with ECG of 06-MAR-2012 10:03,  No significant change was found     CBC with platelets differential     Status: Abnormal    Narrative    The following orders were created for panel order CBC with platelets differential.  Procedure                               Abnormality         Status                     ---------                               -----------         ------                     CBC with platelets and d...[[id number]]  Abnormal            Final result                 Please view results for these tests on the individual orders.          Recent Results (from the past 48 hour(s))   XR Chest Port 1 View    Narrative    EXAM: XR CHEST PORT 1 VIEW  LOCATION: [facility]  DATE/TIME: 11/25/2021 6:21 PM    INDICATION: Cough  COMPARISON: Chest x-ray 03/05/2012      Impression    IMPRESSION: Minimal right basilar atelectasis versus scarring. 10 mm nodular opacity at the left lung base, likely a prominent nipple shadow. 2 cm asymmetric opacity within the right mid lung, perhaps developing infiltrates. However, an intrapulmonary   lesion cannot be excluded. At least short interval x-ray follow-up to assess for resolution is recommended. No pleural effusion. Normal heart size.       EKG results: NSR     All imaging studies reviewed by me.         Patient`s old medical records reviewed and case discussed with the ED physician.    ED course-Reviewed  and care plan  discussed with Dr. Duffy

## 2021-11-26 NOTE — ED TRIAGE NOTES
"Shortness of breath today as well as \"achy\". States has been staying at her daughters and having to walk up stairs. Hx COPD. Satting 92% on RA, tachypneic upon arrival. Joking with staff while triaging. Telling lengthy jokes while this nurse trying to triage, and obtaining EKG. ABCD's intact;  A/O x 4.   "

## 2021-11-26 NOTE — ED PROVIDER NOTES
History   Chief Complaint:  Shortness of Breath     HPI   Pooja Trinidad is a 66 year old female with history of COPD who presents for evaluation of shortness of breath.     History of COPD normally wears oxygen at night but has had worsening shortness of breath and cough over the last day.  No fever body aches or sore throat.  Has been using oxygen throughout the day.  Has been compliant with her chronic pulmonary medications including her 4 times a day duo nebs.  No chest pain, no vomiting, no diarrhea melena or hematochezia.  No bladder changes.  Has been vaccinated against Covid including a booster.    Recently saw pulmonologist through the [employer] system.    Hospitalized at [facility] in September for 5 days related to a COPD exacerbation.  Has never been intubated for a COPD exacerbation    Review of Systems   ROS: 10 point ROS neg other than the symptoms noted above in the HPI.    Allergies:  No known drug allergies     Medications:  Aspirin   Albuterol nebulizer  Duoneb  Combivent respimat inhaler  Toprol-XL   Zoloft      Past Medical History:     COPD   Anxiety  Depression  Hypertension   Brain aneurysm AVM   Paroxysmal atrial fibrillation  Spinal stenosis   Osteoarthritis of both hips     Past Surgical History:    IR carotid angiogram  Tonsillectomy and adenoidectomy  Brain AVM repair  Eardrum revision  Colonoscopy  Ablation of dysrhythmic focus  Colonoscopy  Total hip arthroplasty right      Family History:    CAD - Mother   Osteoporosis - Mother     Social History:  Here alone.  The patient is vaccinated for COVID-19.     Physical Exam     Patient Vitals for the past 24 hrs:   BP Pulse SpO2   11/25/21 1945 -- -- (!) 89 %   11/25/21 1942 -- -- (!) 88 %   11/25/21 1940 -- -- 92 %   11/25/21 1837 -- -- 93 %   11/25/21 1836 (!) 170/82 88 91 %       Physical Exam    HENT:  mmm, no rhinorrhea  Eyes: periorbital tissues and sclera normal   Neck: supple, no abnormal swelling  Lungs:  Tachypnea, diffuse decreased air entry, prolonged expiratory phase, accessory muscle use.  Speaking in short sentences  CV: rrr, no m/r/g, ppi  Abd: soft, nontender, nondistended, no rebound/masses/guarding/hsm  Ext: no peripheral edema  Skin: warm, dry, well perfused, no rashes/bruising/lesions on exposed skin  Neuro: alert, MAEE, no gross motor or sensory deficits  Psych: Normal mood, normal affect      Emergency Department Course   ECG  ECG obtained at 18:11:12, ECG read at 1813  Normal sinus rhythm, Normal ECG.  Rate 77 bpm. CO interval 172 ms. QRS duration 80 ms. QT/QTc 382/432 ms. P-R-T axes 65 -8 60.     Imaging:  XR Chest Port:  IMPRESSION: Minimal right basilar atelectasis versus scarring. 10 mm nodular opacity at the left lung base, likely a prominent nipple shadow. 2 cm asymmetric opacity within the right mid lung, perhaps developing infiltrates. However, an intrapulmonary lesion cannot be excluded. At least short interval x-ray follow-up to assess for resolution is recommended. No pleural effusion. Normal heart size.  Per radiology.     Laboratory:  CBC: WBC 9.0,HGB 14.5,    BMP: Anion gap 2 low, Glucose 108 high, o/w WNL (Creatinine 0.52)   ISTAT gases lactate shannan POCT: Lactic acid 1.0, Bicarbonate 31 high, O2 sat 53 low, pCO2 59 high, pH 7.33, pO2 31   Troponin (Collected 1837): <0.015  Troponin (Collected 1837): TNIH 10     Symptomatic Influenza A/B & SARS-CoV2 (COVID19) Virus PCR Multiplex: Pending       Emergency Department Course:  Reviewed:  I reviewed nursing notes, vitals and past medical history    Assessments:  1804: I obtained history and examined the patient as noted above.     Consults:  1952: I spoke with Dr. Fajardo of the hospitalist service regarding patient's presentation, findings, and plan of care.     Interventions:  1818 Duoneb 6 mL Nebulization  1829 Solu-medrol 125 mg V   1831 Magnesium sulfate 2 g IV   1841 Albuterol 2.5 mg Nebulization     Disposition:  The patient was  admitted to the hospital under the care of Dr. Fajardo.     Impression & Plan     CMS Diagnoses:   and None    Medical Decision Makin-year-old female here with presentation consistent with COPD exacerbation.  Low suspicion for cardiogenic etiology or pulmonary embolism.  Radiograph showing no evidence of lobar pneumonia or pneumothorax.  Was a started on IV steroids, stacked bronchodilators, magnesium.  Work of breathing did improve with these interventions however will need to continued monitoring, scheduled bronchodilators and steroids.  We will plan on admission until improving to the point where she can transition to oral medications based breathing treatments at home.  Started on doxycycline for atypical bacterial coverage    Covid-19  Pooja Trinidad was evaluated during a global COVID-19 pandemic, which necessitated consideration that the patient might be at risk for infection with the SARS-CoV-2 virus that causes COVID-19.   Applicable protocols for evaluation were followed during the patient's care.   COVID-19 was considered as part of the patient's evaluation. The plan for testing is:  a test was obtained during this visit.     Diagnosis:    ICD-10-CM    1. COPD exacerbation (H)  J44.1         Scribe Disclosure:  I, Luis Oliveira, am serving as a scribe at 6:08 PM on 2021 to document services personally performed by Dr. Duffy, based on my observations and the provider's statements to me.      Martín Duffy MD  [phone]

## 2021-11-26 NOTE — PLAN OF CARE
End of Shift Summary  For vital signs and complete assessments, please see documentation flowsheets.     Pertinent assessments: Patient A&Ox4, up with SBA, VSS, denies pain, on oxygen 2LPM which is her baseline at night., LS wheezing expiratory, infrequent nonproductive cough. Skin intact with dry, scaling skin on both feet. BS active, LBM 11/25 per patient, voiding adequately.     Major Shift Events: Admission   Treatment Plan: Doxycycline, nebs, solumemedrol   Bedside Nurse: Donovan Jean RN

## 2021-11-26 NOTE — ED NOTES
.  [facility]  ED Nurse Handoff Report    Pooja Trinidad is a 66 year old female   ED Chief complaint: Shortness of Breath  . ED Diagnosis:   Final diagnoses:   COPD exacerbation (H)     Allergies:   Allergies   Allergen Reactions     Nickel Rash     Contact dermatitis   Contact dermatitis   Contact dermatitis   Contact dermatitis          Code Status: Full Code  Activity level - Baseline/Home:  Independent. Activity Level - Current:   Stand by Assist. Lift room needed: No. Bariatric: No   Needed: No   Isolation: Yes. Infection: Not Applicable  COVID r/o and special precautions.     Vital Signs:   Vitals:    11/25/21 2000 11/25/21 2030 11/25/21 2031 11/25/21 2034   BP: (!) 176/88 (!) 191/95     Pulse: 89 94     SpO2: 94% 90% 92% 93%       Cardiac Rhythm:  ,      Pain level:    Patient confused: No. Patient Falls Risk: Yes.   Elimination Status: Has voided   Patient Report - Initial Complaint: a 66 year old female with history of COPD who presents for evaluation of shortness of breath.      History of COPD normally wears oxygen at night but has had worsening shortness of breath and cough over the last day.  No fever body aches or sore throat.  Has been using oxygen throughout the day.  Has been compliant with her chronic pulmonary medications including her 4 times a day duo nebs.  No chest pain, no vomiting, no diarrhea melena or hematochezia.  No bladder changes.  Has been vaccinated against Covid including a booster.     Recently saw pulmonologist through the [facility] system.     Hospitalized at [facility] in September for 5 days related to a COPD exacerbation.  Has never been intubated for a COPD exacerbation   Focused Assessment:   Respiratory LA        Details:   Respiratory - Respiratory WDL: .WDL except; all  Rhythm/Pattern, Respiratory: shortness of breath; tachypnea  Expansion/Accessory Muscles/Retractions: abdominal muscle use  Breath Sounds: All Fields  Cough  Frequency: infrequent   Head To Toe Assessment - All Lung Fields Breath Sounds: Posterior:; wheezes, expiratory (RLL crackles)           Tests Performed: CXR, labs. Abnormal Results: .  Labs Ordered and Resulted from Time of ED Arrival to Time of ED Departure   BASIC METABOLIC PANEL - Abnormal       Result Value    Sodium 140      Potassium 4.2      Chloride 106      Carbon Dioxide (CO2) 32      Anion Gap 2 (*)     Urea Nitrogen 14      Creatinine 0.52      Calcium 9.4      Glucose 108 (*)     GFR Estimate >90     CBC WITH PLATELETS AND DIFFERENTIAL - Abnormal    WBC Count 9.0      RBC Count 4.38      Hemoglobin 14.5      Hematocrit 45.3       (*)     MCH 33.1 (*)     MCHC 32.0      RDW 12.2      Platelet Count 414      % Neutrophils 57      % Lymphocytes 28      % Monocytes 12      % Eosinophils 2      % Basophils 1      % Immature Granulocytes 0      NRBCs per 100 WBC 0      Absolute Neutrophils 5.1      Absolute Lymphocytes 2.6      Absolute Monocytes 1.1      Absolute Eosinophils 0.1      Absolute Basophils 0.1      Absolute Immature Granulocytes 0.0      Absolute NRBCs 0.0     ISTAT GASES LACTATE VENOUS POCT - Abnormal    Lactic Acid POCT 1.0      Bicarbonate Venous POCT 31 (*)     O2 Sat, Venous POCT 53 (*)     pCO2V Venous POCT 59 (*)     pH Venous POCT 7.33      pO2 Venous POCT 31     TROPONIN I - Normal    Troponin I High Sensitivity 10     INFLUENZA A/B & SARS-COV2 PCR MULTIPLEX - Normal    Influenza A PCR Negative      Influenza B PCR Negative      SARS CoV2 PCR Negative     TROPONIN I - Normal    Troponin I <0.015       .  XR Chest Port 1 View   Final Result   IMPRESSION: Minimal right basilar atelectasis versus scarring. 10 mm nodular opacity at the left lung base, likely a prominent nipple shadow. 2 cm asymmetric opacity within the right mid lung, perhaps developing infiltrates. However, an intrapulmonary    lesion cannot be excluded. At least short interval x-ray follow-up to assess for  resolution is recommended. No pleural effusion. Normal heart size.         Treatments provided: see ED meds below  Family Comments: NA  OBS brochure/video discussed/provided to patient:  N/A  ED Medications:   Medications   doxycycline hyclate (VIBRAMYCIN) capsule 100 mg (0 mg Oral Hold 11/25/21 2000)   ipratropium - albuterol 0.5 mg/2.5 mg/3 mL (DUONEB) neb solution 6 mL (6 mLs Nebulization Given 11/25/21 1818)   methylPREDNISolone sodium succinate (solu-MEDROL) injection 125 mg (125 mg Intravenous Given 11/25/21 1829)   magnesium sulfate 2 g in water intermittent infusion (0 g Intravenous Stopped 11/25/21 1931)   albuterol (PROVENTIL) neb solution 2.5 mg (2.5 mg Nebulization Given 11/25/21 1841)   albuterol (PROVENTIL) neb solution 2.5 mg (2.5 mg Nebulization Given 11/25/21 2011)     Drips infusing:  No  For the majority of the shift, the patient's behavior Green. Interventions performed were NA.    Sepsis treatment initiated: No     Patient tested for COVID 19 prior to admission: YES    ED Nurse Name/Phone Number: Joselin Batista RN,   8:24 PM  RECEIVING UNIT ED HANDOFF REVIEW    Above ED Nurse Handoff Report was reviewed: Yes  Reviewed by: Randi Wright RN on November 25, 2021 at 8:41 PM

## 2021-11-26 NOTE — CONSULTS
Care Management Initial Consult    General Information  Assessment completed with: Patient,    Type of CM/SW Visit: Initial Assessment    Primary Care Provider verified and updated as needed: Yes   Readmission within the last 30 days:        Reason for Consult: discharge planning  Advance Care Planning: Advance Care Planning Reviewed: no concerns identified          Communication Assessment  Patient's communication style: spoken language (English or Bilingual)    Hearing Difficulty or Deaf: no   Wear Glasses or Blind: no    Cognitive  Cognitive/Neuro/Behavioral: WDL                      Living Environment:   People in home: child(anaya), adult     Current living Arrangements:Currently living at daughter's, Coni and son-in-law, home. States her other daughter is also living with them.   Living in split level home( 8 steps up, 7 down).     Able to return to prior arrangements: yes       Family/Social Support:  Care provided by: self  Provides care for: no one  Marital Status:   Children          Description of Support System: Supportive,Involved  x3 adult children. Son who lives nearby. Daughters x2 in the cities.   Support Assessment: Adequate family and caregiver support    Current Resources:   Patient receiving home care services: No     Community Resources: None  Equipment currently used at home: none  Supplies currently used at home: Oxygen Tubing/Supplies    Employment/Financial:  Employment Status: retired        Financial Concerns: No concerns identified           Lifestyle & Psychosocial Needs:  Social Determinants of Health     Tobacco Use: Not on file   Alcohol Use: Not on file   Financial Resource Strain: Not on file   Food Insecurity: Not on file   Transportation Needs: Not on file   Physical Activity: Not on file   Stress: Not on file   Social Connections: Not on file   Intimate Partner Violence: Not on file   Depression: Not on file   Housing Stability: Not on file       Functional Status:  Prior  to admission patient needed assistance:   Dependent ADLs:: Independent  Dependent IADLs:: Independent       Mental Health Status:  Mental Health Status: No Current Concerns       Chemical Dependency Status:  Chemical Dependency Status:  Reports working on  quitting smoking. Current  Smoker, states 2 cig/day; uses nicotine patch. States was previously smoking 1.5 pk/day. Smoking cessation discussed with patient.    Values/Beliefs:  Spiritual, Cultural Beliefs, Latter day Practices, Values that affect care: no               Additional Information:  Patient admitted with COPD exacerbation. She is on continuous oxygen while inpt. Met with patient for discharge planning. Patient reports she uses oxygen 2 liters at night only. [facility] she has O2 concentrator and portable O2 device (she is unsure of DME provider). [facility] she has nebulizer, oxygen tubing and supplies. Patient reports she is from [place], but has been staying with her daughters in the [facility] for the past few weeks. Her  recently passed in Sept 2021. She anticipates being in the [facility] for a while.     She states her daughters are helping her to establish care with providers in the [facility]. [facility] she has established care with pulmonology and has follow up appointments already scheduled with pulmonology and cardiology.     Reviewed COPD education and information given to patient.Smoking cessation discussed with patient. Pt prefers her daughter schedule post hosp f/u appointments.     Daughter will bring current home oxygen supplier contact information and portable device. Any changes to home O2 needs to be coordinated with DME provider.     Will continue to follow for discharge plan of care.     Audra Yost RN Case Manager  Inpatient Care Coordination   [facility]   [phone]

## 2021-11-26 NOTE — PHARMACY-ADMISSION MEDICATION HISTORY
Admission medication history interview status for this patient is complete. See [facility] admission navigator for allergy information, prior to admission medications and immunization status.     Medication history interview done, indicate source(s): Patient  Medication history resources (including written lists, pill bottles, clinic record):[facility], dispensing records    Changes made to [facility] medication list:  Added: all    Additional medication history information: pt has own Combivent inhaler; pt can obtain Trelegy inhlaler tomorrow.    Prior to Admission medications    Medication Sig Last Dose Taking? Auth Provider   aspirin (ASA) 81 MG EC tablet Take 81 mg by mouth daily 11/25/2021 at am Yes Unknown, Entered By History   Aspirin-Caffeine 500-32.5 MG TABS Take 2 tablets by mouth 2 times daily as needed  Yes Unknown, Entered By History   azithromycin (ZITHROMAX) 250 MG tablet Take 1 tablet by mouth Every Mon, Wed, Fri Morning 11/24/2021 at Unknown time Yes Unknown, Entered By History   calcium carbonate 600 mg-vitamin D 400 units (CALTRATE) 600-400 MG-UNIT per tablet Take 1 tablet by mouth daily (with breakfast) 11/25/2021 at am Yes Unknown, Entered By History   Fluticasone-Umeclidin-Vilanterol (TRELEGY ELLIPTA) 100-62.5-25 MCG/INH oral inhaler Inhale 1 puff into the lungs daily 11/25/2021 at pt may obtain tomorrow Yes Unknown, Entered By History   ipratropium - albuterol 0.5 mg/2.5 mg/3 mL (DUONEB) 0.5-2.5 (3) MG/3ML neb solution Inhale 3 mLs into the lungs every 6 hours as needed  Yes Unknown, Entered By History   ipratropium-albuterol (COMBIVENT RESPIMAT)  MCG/ACT inhaler Inhale 1 puff into the lungs 4 times daily as needed has own inhaler Yes Unknown, Entered By History   metoprolol succinate ER (TOPROL-XL) 50 MG 24 hr tablet Take 50 mg by mouth 2 times daily 11/25/2021 at 2x Yes Unknown, Entered By History   multivitamin w/minerals (MULTIVITAMIN, THERAPEUTIC WITH MINERALS) tablet Take 1 tablet by mouth Every Mon,  Wed, Fri Morning 11/24/2021 at Unknown time Yes Unknown, Entered By History   sertraline (ZOLOFT) 25 MG tablet Take 25 mg by mouth daily 11/25/2021 at Unknown time Yes Unknown, Entered By History

## 2021-11-27 VITALS
RESPIRATION RATE: 18 BRPM | WEIGHT: 167.9 LBS | HEART RATE: 74 BPM | SYSTOLIC BLOOD PRESSURE: 139 MMHG | DIASTOLIC BLOOD PRESSURE: 84 MMHG | TEMPERATURE: 98.9 F | BODY MASS INDEX: 27.97 KG/M2 | HEIGHT: 65 IN | OXYGEN SATURATION: 93 %

## 2021-11-27 PROBLEM — J96.21 ACUTE ON CHRONIC RESPIRATORY FAILURE WITH HYPOXIA (H): Status: ACTIVE | Noted: 2021-11-27

## 2021-11-27 LAB
MAGNESIUM SERPL-MCNC: 2.4 MG/DL (ref 1.6–2.3)
POTASSIUM BLD-SCNC: 4.2 MMOL/L (ref 3.4–5.3)

## 2021-11-27 PROCEDURE — 94640 AIRWAY INHALATION TREATMENT: CPT

## 2021-11-27 PROCEDURE — 250N000013 HC RX MED GY IP 250 OP 250 PS 637: Performed by: INTERNAL MEDICINE

## 2021-11-27 PROCEDURE — 94640 AIRWAY INHALATION TREATMENT: CPT | Mod: 76

## 2021-11-27 PROCEDURE — 36415 COLL VENOUS BLD VENIPUNCTURE: CPT | Performed by: INTERNAL MEDICINE

## 2021-11-27 PROCEDURE — 99238 HOSP IP/OBS DSCHRG MGMT 30/<: CPT | Performed by: INTERNAL MEDICINE

## 2021-11-27 PROCEDURE — 83735 ASSAY OF MAGNESIUM: CPT | Performed by: INTERNAL MEDICINE

## 2021-11-27 PROCEDURE — 84132 ASSAY OF SERUM POTASSIUM: CPT | Performed by: INTERNAL MEDICINE

## 2021-11-27 PROCEDURE — 250N000009 HC RX 250: Performed by: INTERNAL MEDICINE

## 2021-11-27 PROCEDURE — 250N000012 HC RX MED GY IP 250 OP 636 PS 637: Performed by: INTERNAL MEDICINE

## 2021-11-27 PROCEDURE — 999N000157 HC STATISTIC RCP TIME EA 10 MIN

## 2021-11-27 RX ORDER — AZITHROMYCIN 250 MG/1
250 TABLET, FILM COATED ORAL
Status: ON HOLD
Start: 2021-12-06 | End: 2021-12-05

## 2021-11-27 RX ORDER — DOXYCYCLINE 100 MG/1
100 CAPSULE ORAL 2 TIMES DAILY
Qty: 14 CAPSULE | Refills: 0 | Status: ON HOLD | OUTPATIENT
Start: 2021-11-27 | End: 2021-12-05

## 2021-11-27 RX ORDER — PREDNISONE 20 MG/1
40 TABLET ORAL DAILY
Qty: 8 TABLET | Refills: 0 | Status: ON HOLD | OUTPATIENT
Start: 2021-11-28 | End: 2021-12-05

## 2021-11-27 RX ADMIN — METOPROLOL SUCCINATE 50 MG: 50 TABLET, EXTENDED RELEASE ORAL at 08:36

## 2021-11-27 RX ADMIN — IPRATROPIUM BROMIDE AND ALBUTEROL SULFATE 3 ML: 2.5; .5 SOLUTION RESPIRATORY (INHALATION) at 15:33

## 2021-11-27 RX ADMIN — DOXYCYCLINE HYCLATE 100 MG: 100 CAPSULE ORAL at 08:36

## 2021-11-27 RX ADMIN — PREDNISONE 40 MG: 20 TABLET ORAL at 08:36

## 2021-11-27 RX ADMIN — IPRATROPIUM BROMIDE AND ALBUTEROL SULFATE 3 ML: 2.5; .5 SOLUTION RESPIRATORY (INHALATION) at 08:16

## 2021-11-27 RX ADMIN — SERTRALINE HYDROCHLORIDE 25 MG: 25 TABLET ORAL at 08:36

## 2021-11-27 RX ADMIN — TERBINAFINE HYDROCHLORIDE: 1 CREAM TOPICAL at 08:38

## 2021-11-27 RX ADMIN — ASPIRIN 81 MG: 81 TABLET, COATED ORAL at 08:35

## 2021-11-27 RX ADMIN — Medication 1 TABLET: at 08:36

## 2021-11-27 RX ADMIN — FLUTICASONE FUROATE AND VILANTEROL TRIFENATATE 1 PUFF: 100; 25 POWDER RESPIRATORY (INHALATION) at 08:16

## 2021-11-27 ASSESSMENT — ACTIVITIES OF DAILY LIVING (ADL)
ADLS_ACUITY_SCORE: 6

## 2021-11-27 NOTE — PROGRESS NOTES
Oxygen Documentation:   I certify that this patient, Pooja Trinidad has been under my care (or a nurse practitioner or physican's assistant working with me). This is the face-to-face encounter for oxygen medical necessity.      Pooja Tirnidad is now in a chronic stable state and continues to require supplemental oxygen. Patient has continued oxygen desaturation due to Chronic Respiratory Failure with Hypoxia J93.11  COPD J44.9.    Alternative treatment(s) tried or considered and deemed clinically infective for treatment of Chronic Respiratory Failure with Hypoxia J93.11  COPD J44.9 include nebulizers, inhalers, steroids, pulmonary toileting, and pulmonary rehab.  If portability is ordered, is the patient mobile within the home? yes

## 2021-11-27 NOTE — PLAN OF CARE
Assessments: VSS. A&O x4.  On 1LNC, SpO2 in low to mid 90s. Some dyspnea on exertion, transfers independently. Pain 3-5/10 over left side of head, tylenol given x1.  Expiratory wheezes noted. Infrequent, nonproductive cough. Denies nausea.     Treatment Plan: Doxycycline, PO prednisone, and scheduled duonebs. Wean O2 as able.     Bedside Nurse: Claudio Mederos RN

## 2021-11-27 NOTE — DISCHARGE INSTRUCTIONS
Your home oxygen referrals was sent to [phone] #[phone]. Please call [phone] if you have questions about your oxygen equipment and service.

## 2021-11-27 NOTE — PROGRESS NOTES
Patient has been assessed for Home Oxygen needs. Oxygen readings:    *Pulse oximetry (SpO2) = 91% on room air at rest while awake.    *SpO2 = 85% on room air during activity/with exercise.    *SpO2 improved to 90% on 1liters/minute during activity/with exercise.

## 2021-11-27 NOTE — PROGRESS NOTES
Care Management Follow Up    Length of Stay (days): 2    Expected Discharge Date: 11/27/2021     Concerns to be Addressed: care coordination/care conferences,discharge planning     Patient plan of care discussed at interdisciplinary rounds: Yes    Anticipated Discharge Disposition: Home with family     Anticipated Discharge Services: None  Anticipated Discharge DME: Oxygen    Patient/family educated on Medicare website which has current facility and service quality ratings: no  Education Provided on the Discharge Plan:  yes  Patient/Family in Agreement with the Plan: yes    Additional Information:  Patient/daughter states home oxygen supplier is [facility] [employer] ([employer] [employer]) ph#[phone], on-call ph# [phone]. Spoke with home oxygen on-call staff, [facility]. On call staff will get baseline O2 orders, device capacity and call back with information. Oxygen discharge orders can be faxed to [facility] [employer] fax#[phone].     Audra Yost RN Case Manager  Inpatient Care Coordination   [facility]   [phone]    Update 1430: Received call back from [facility]- [employer]. States patient current in homeO2 concentrator can go up to 5 Liters. Patient also has portable device.     Update 1600: Discussed patient discharging home today. New orders for continuous O2 at 1L NC. [facility] [employer] will continue to follow for home oxygen needs.  Discharge oxygen orders faxed, including MD face to face and nursing O2 (walk test) documentation .       Audra Yost RN

## 2021-11-27 NOTE — DISCHARGE SUMMARY
[facility] Discharge Summary    Pooja Trinidad MRN# [id number]   Age: 66 year old YOB: 1955     Date of Admission:  11/25/2021  Date of Discharge::  11/27/2021  5:13 PM  Admitting Physician:  Mickey Fajardo MD  Discharge Physician:  Lowell Duncan MD     Home clinic: Not established locally          Admission Diagnoses:   COPD exacerbation (H) [J44.1]  Acute exacerbation of chronic obstructive pulmonary disease (COPD) (H) [J44.1]          Discharge Diagnosis:   Principal Problem:    Acute on chronic respiratory failure with hypoxia (H)  Active Problems:    COPD exacerbation (H)    Acute exacerbation of chronic obstructive pulmonary disease (COPD) (H)            Procedures:   CXR          Discharge Medications:     Discharge Medication List as of 11/27/2021  3:58 PM      START taking these medications    Details   doxycycline hyclate (VIBRAMYCIN) 100 MG capsule Take 1 capsule (100 mg) by mouth 2 times daily for 7 days, Disp-14 capsule, R-0, E-Prescribe      predniSONE (DELTASONE) 20 MG tablet Take 2 tablets (40 mg) by mouth daily for 4 days, Disp-8 tablet, R-0, E-Prescribe         CONTINUE these medications which have CHANGED    Details   azithromycin (ZITHROMAX) 250 MG tablet Take 1 tablet (250 mg) by mouth Every Mon, Wed, Fri Morning, No Print Out         CONTINUE these medications which have NOT CHANGED    Details   aspirin (ASA) 81 MG EC tablet Take 81 mg by mouth daily, Historical      Aspirin-Caffeine 500-32.5 MG TABS Take 2 tablets by mouth 2 times daily as needed, Historical      calcium carbonate 600 mg-vitamin D 400 units (CALTRATE) 600-400 MG-UNIT per tablet Take 1 tablet by mouth daily (with breakfast), Historical      Fluticasone-Umeclidin-Vilanterol (TRELEGY ELLIPTA) 100-62.5-25 MCG/INH oral inhaler Inhale 1 puff into the lungs daily, Historical      ipratropium - albuterol 0.5 mg/2.5 mg/3 mL (DUONEB) 0.5-2.5 (3) MG/3ML neb solution Inhale 3 mLs into the lungs  "every 6 hours as needed, Historical      ipratropium-albuterol (COMBIVENT RESPIMAT)  MCG/ACT inhaler Inhale 1 puff into the lungs 4 times daily as needed, Historical      metoprolol succinate ER (TOPROL-XL) 50 MG 24 hr tablet Take 50 mg by mouth 2 times daily, Historical      multivitamin w/minerals (MULTIVITAMIN, THERAPEUTIC WITH MINERALS) tablet Take 1 tablet by mouth Every Mon, Wed, Fri Morning, Historical      sertraline (ZOLOFT) 25 MG tablet Take 25 mg by mouth daily, Historical                   Consultations:   No consultations were requested during this admission          Hospital Course:   Pooja Trinidad is a 66 year old female admitted on 11/25/2021 with acute exacerbation of COPD.  Past medical history significant for COPD, depression, hypertension, paroxysmal atrial fibrillation, intracerebral hemorrhage, anxiety and hypertension.    Ms. Mast was admitted to a medical bed after stabilization in the emergency department.  Noting her Covid and influenza PCRs were negative, she was treated with usual management for COPD exacerbation.  Her oxygen saturation fell only to about 88% for which she was placed on 2 L by nasal cannula.    At baseline, the patient evidently uses oxygen at night only so it was not necessary to establish get additional oxygen appliances to her home on discharge.  Please note the nurse's documentation of patient's oxygen needs that are slightly above her baseline at this point.    /84 (BP Location: Left arm)   Pulse 74   Temp 98.9  F (37.2  C) (Oral)   Resp 18   Ht 1.651 m (5' 5\")   Wt 76.2 kg (167 lb 14.4 oz)   SpO2 93%   BMI 27.94 kg/m    I met Ms. Mast on the date of her discharge.  She appeared comfortable and alert.  Her daughter was at the bedside and indicated that she felt that the patient was also at her baseline.  We discussed indications for return to the hospital as well as follow-up.  We also talked about the completing a course of " antibiotics as well as steroids.  HEENT: No focal muscular asymmetry.  Chest: Diffuse but distant wheeze.  No increased work of breathing.  No rales or egophony.  Heart: Regular rate and rhythm without murmur.          Discharge Instructions and Follow-Up:   Discharge diet: Regular   Discharge activity: Activity as tolerated   Discharge follow-up:  Recommend the patient follow-up with her primary physician as soon as is possible.           Discharge Disposition:   Discharged to home      Attestation:  I have reviewed today's vital signs, notes, medications, labs and imaging.  Total time: 25 minutes    Lowell Duncan MD

## 2021-11-28 NOTE — PROGRESS NOTES
Pt states ready for discharge, IV removed per order with no complications, daughter at bedside for education, discharge education completed with pt and daughter, pt has order for oxygen on discharge, pt does not have a tank at bedside but does have tank at home, pt stated she will use the tank at home and will be okay until she goes home, pt instructed to call medical supply company for oxygen delivery upon arriving home, pt taken to main entrance via wheelchair, no acute changes

## 2021-11-29 ENCOUNTER — PATIENT OUTREACH (OUTPATIENT)
Dept: CARE COORDINATION | Facility: CLINIC | Age: 66
End: 2021-11-29

## 2021-11-29 ENCOUNTER — HOSPITAL ENCOUNTER (INPATIENT)
Facility: CLINIC | Age: 66
LOS: 6 days | Discharge: HOME | DRG: 190 | End: 2021-12-05
Attending: EMERGENCY MEDICINE | Admitting: INTERNAL MEDICINE
Payer: COMMERCIAL

## 2021-11-29 ENCOUNTER — APPOINTMENT (OUTPATIENT)
Dept: GENERAL RADIOLOGY | Facility: CLINIC | Age: 66
DRG: 190 | End: 2021-11-29
Attending: EMERGENCY MEDICINE
Payer: COMMERCIAL

## 2021-11-29 DIAGNOSIS — Z71.89 OTHER SPECIFIED COUNSELING: ICD-10-CM

## 2021-11-29 DIAGNOSIS — J44.1 COPD EXACERBATION (H): ICD-10-CM

## 2021-11-29 DIAGNOSIS — J96.21 ACUTE ON CHRONIC RESPIRATORY FAILURE WITH HYPOXIA (H): Primary | ICD-10-CM

## 2021-11-29 LAB
ANION GAP SERPL CALCULATED.3IONS-SCNC: 7 MMOL/L (ref 3–14)
ATRIAL RATE - MUSE: 90 BPM
BASOPHILS # BLD AUTO: 0.1 10E3/UL (ref 0–0.2)
BASOPHILS NFR BLD AUTO: 1 %
BUN SERPL-MCNC: 23 MG/DL (ref 7–30)
CALCIUM SERPL-MCNC: 9.5 MG/DL (ref 8.5–10.1)
CHLORIDE BLD-SCNC: 105 MMOL/L (ref 94–109)
CO2 SERPL-SCNC: 28 MMOL/L (ref 20–32)
CREAT SERPL-MCNC: 0.57 MG/DL (ref 0.52–1.04)
DIASTOLIC BLOOD PRESSURE - MUSE: NORMAL MMHG
EOSINOPHIL # BLD AUTO: 0 10E3/UL (ref 0–0.7)
EOSINOPHIL NFR BLD AUTO: 0 %
ERYTHROCYTE [DISTWIDTH] IN BLOOD BY AUTOMATED COUNT: 12.3 % (ref 10–15)
FLUAV RNA SPEC QL NAA+PROBE: NEGATIVE
FLUBV RNA RESP QL NAA+PROBE: NEGATIVE
GFR SERPL CREATININE-BSD FRML MDRD: >90 ML/MIN/1.73M2
GLUCOSE BLD-MCNC: 89 MG/DL (ref 70–99)
HCO3 BLDV-SCNC: 31 MMOL/L (ref 21–28)
HCT VFR BLD AUTO: 46.5 % (ref 35–47)
HGB BLD-MCNC: 14.9 G/DL (ref 11.7–15.7)
IMM GRANULOCYTES # BLD: 0.1 10E3/UL
IMM GRANULOCYTES NFR BLD: 0 %
INTERPRETATION ECG - MUSE: NORMAL
LACTATE BLD-SCNC: 1.4 MMOL/L
LYMPHOCYTES # BLD AUTO: 2 10E3/UL (ref 0.8–5.3)
LYMPHOCYTES NFR BLD AUTO: 15 %
MCH RBC QN AUTO: 33.3 PG (ref 26.5–33)
MCHC RBC AUTO-ENTMCNC: 32 G/DL (ref 31.5–36.5)
MCV RBC AUTO: 104 FL (ref 78–100)
MONOCYTES # BLD AUTO: 1.2 10E3/UL (ref 0–1.3)
MONOCYTES NFR BLD AUTO: 9 %
NEUTROPHILS # BLD AUTO: 10.1 10E3/UL (ref 1.6–8.3)
NEUTROPHILS NFR BLD AUTO: 75 %
NRBC # BLD AUTO: 0 10E3/UL
NRBC BLD AUTO-RTO: 0 /100
P AXIS - MUSE: 84 DEGREES
PCO2 BLDV: 56 MM HG (ref 40–50)
PH BLDV: 7.35 [PH] (ref 7.32–7.43)
PLATELET # BLD AUTO: 399 10E3/UL (ref 150–450)
PO2 BLDV: 34 MM HG (ref 25–47)
POTASSIUM BLD-SCNC: 3.9 MMOL/L (ref 3.4–5.3)
PR INTERVAL - MUSE: 148 MS
QRS DURATION - MUSE: 80 MS
QT - MUSE: 344 MS
QTC - MUSE: 420 MS
R AXIS - MUSE: 17 DEGREES
RBC # BLD AUTO: 4.47 10E6/UL (ref 3.8–5.2)
SAO2 % BLDV: 61 % (ref 94–100)
SARS-COV-2 RNA RESP QL NAA+PROBE: NEGATIVE
SODIUM SERPL-SCNC: 140 MMOL/L (ref 133–144)
SYSTOLIC BLOOD PRESSURE - MUSE: NORMAL MMHG
T AXIS - MUSE: 72 DEGREES
VENTRICULAR RATE- MUSE: 90 BPM
WBC # BLD AUTO: 13.5 10E3/UL (ref 4–11)

## 2021-11-29 PROCEDURE — 999N000157 HC STATISTIC RCP TIME EA 10 MIN

## 2021-11-29 PROCEDURE — 85025 COMPLETE CBC W/AUTO DIFF WBC: CPT | Performed by: EMERGENCY MEDICINE

## 2021-11-29 PROCEDURE — 250N000009 HC RX 250

## 2021-11-29 PROCEDURE — 96374 THER/PROPH/DIAG INJ IV PUSH: CPT

## 2021-11-29 PROCEDURE — 80048 BASIC METABOLIC PNL TOTAL CA: CPT | Performed by: EMERGENCY MEDICINE

## 2021-11-29 PROCEDURE — 71045 X-RAY EXAM CHEST 1 VIEW: CPT

## 2021-11-29 PROCEDURE — 94640 AIRWAY INHALATION TREATMENT: CPT

## 2021-11-29 PROCEDURE — 250N000013 HC RX MED GY IP 250 OP 250 PS 637: Performed by: HOSPITALIST

## 2021-11-29 PROCEDURE — 250N000009 HC RX 250: Performed by: PHYSICIAN ASSISTANT

## 2021-11-29 PROCEDURE — 99207 PR APP CREDIT; MD BILLING SHARED VISIT: CPT | Performed by: PHYSICIAN ASSISTANT

## 2021-11-29 PROCEDURE — 99285 EMERGENCY DEPT VISIT HI MDM: CPT | Mod: 25

## 2021-11-29 PROCEDURE — 99223 1ST HOSP IP/OBS HIGH 75: CPT | Mod: AI | Performed by: INTERNAL MEDICINE

## 2021-11-29 PROCEDURE — 120N000001 HC R&B MED SURG/OB

## 2021-11-29 PROCEDURE — C9803 HOPD COVID-19 SPEC COLLECT: HCPCS

## 2021-11-29 PROCEDURE — 250N000013 HC RX MED GY IP 250 OP 250 PS 637: Performed by: EMERGENCY MEDICINE

## 2021-11-29 PROCEDURE — 250N000011 HC RX IP 250 OP 636: Performed by: EMERGENCY MEDICINE

## 2021-11-29 PROCEDURE — 36415 COLL VENOUS BLD VENIPUNCTURE: CPT | Performed by: EMERGENCY MEDICINE

## 2021-11-29 PROCEDURE — 250N000011 HC RX IP 250 OP 636: Performed by: PHYSICIAN ASSISTANT

## 2021-11-29 PROCEDURE — 87636 SARSCOV2 & INF A&B AMP PRB: CPT | Performed by: PHYSICIAN ASSISTANT

## 2021-11-29 PROCEDURE — 250N000013 HC RX MED GY IP 250 OP 250 PS 637: Performed by: PHYSICIAN ASSISTANT

## 2021-11-29 PROCEDURE — 82803 BLOOD GASES ANY COMBINATION: CPT

## 2021-11-29 PROCEDURE — 250N000009 HC RX 250: Performed by: INTERNAL MEDICINE

## 2021-11-29 PROCEDURE — 93005 ELECTROCARDIOGRAM TRACING: CPT

## 2021-11-29 RX ORDER — METOPROLOL SUCCINATE 50 MG/1
50 TABLET, EXTENDED RELEASE ORAL
Status: DISCONTINUED | OUTPATIENT
Start: 2021-11-29 | End: 2021-12-05 | Stop reason: HOSPADM

## 2021-11-29 RX ORDER — ACETAMINOPHEN 325 MG/1
650 TABLET ORAL ONCE
Status: COMPLETED | OUTPATIENT
Start: 2021-11-29 | End: 2021-11-29

## 2021-11-29 RX ORDER — ACETAMINOPHEN 325 MG/1
650 TABLET ORAL EVERY 4 HOURS PRN
Status: DISCONTINUED | OUTPATIENT
Start: 2021-11-29 | End: 2021-12-05 | Stop reason: HOSPADM

## 2021-11-29 RX ORDER — IPRATROPIUM BROMIDE AND ALBUTEROL SULFATE 2.5; .5 MG/3ML; MG/3ML
3 SOLUTION RESPIRATORY (INHALATION) EVERY 4 HOURS
Status: DISCONTINUED | OUTPATIENT
Start: 2021-11-29 | End: 2021-11-29

## 2021-11-29 RX ORDER — IPRATROPIUM BROMIDE AND ALBUTEROL SULFATE 2.5; .5 MG/3ML; MG/3ML
3 SOLUTION RESPIRATORY (INHALATION) EVERY 4 HOURS
Status: DISCONTINUED | OUTPATIENT
Start: 2021-11-29 | End: 2021-12-05 | Stop reason: HOSPADM

## 2021-11-29 RX ORDER — ONDANSETRON 4 MG/1
4 TABLET, ORALLY DISINTEGRATING ORAL EVERY 6 HOURS PRN
Status: DISCONTINUED | OUTPATIENT
Start: 2021-11-29 | End: 2021-12-05 | Stop reason: HOSPADM

## 2021-11-29 RX ORDER — GUAIFENESIN 600 MG/1
1200 TABLET, EXTENDED RELEASE ORAL 2 TIMES DAILY
Status: DISCONTINUED | OUTPATIENT
Start: 2021-11-29 | End: 2021-12-05 | Stop reason: HOSPADM

## 2021-11-29 RX ORDER — AMOXICILLIN 250 MG
2 CAPSULE ORAL 2 TIMES DAILY PRN
Status: DISCONTINUED | OUTPATIENT
Start: 2021-11-29 | End: 2021-12-05 | Stop reason: HOSPADM

## 2021-11-29 RX ORDER — IPRATROPIUM BROMIDE AND ALBUTEROL SULFATE 2.5; .5 MG/3ML; MG/3ML
9 SOLUTION RESPIRATORY (INHALATION) ONCE
Status: COMPLETED | OUTPATIENT
Start: 2021-11-29 | End: 2021-11-29

## 2021-11-29 RX ORDER — ALBUTEROL SULFATE 90 UG/1
2 AEROSOL, METERED RESPIRATORY (INHALATION)
COMMUNITY

## 2021-11-29 RX ORDER — ASPIRIN 81 MG/1
81 TABLET ORAL DAILY
Status: DISCONTINUED | OUTPATIENT
Start: 2021-11-29 | End: 2021-12-05 | Stop reason: HOSPADM

## 2021-11-29 RX ORDER — METHYLPREDNISOLONE SODIUM SUCCINATE 40 MG/ML
40 INJECTION, POWDER, LYOPHILIZED, FOR SOLUTION INTRAMUSCULAR; INTRAVENOUS EVERY 12 HOURS
Status: DISCONTINUED | OUTPATIENT
Start: 2021-11-29 | End: 2021-12-05

## 2021-11-29 RX ORDER — IPRATROPIUM BROMIDE AND ALBUTEROL SULFATE 2.5; .5 MG/3ML; MG/3ML
SOLUTION RESPIRATORY (INHALATION)
Status: COMPLETED
Start: 2021-11-29 | End: 2021-11-29

## 2021-11-29 RX ORDER — SERTRALINE HYDROCHLORIDE 25 MG/1
25 TABLET, FILM COATED ORAL DAILY
Status: DISCONTINUED | OUTPATIENT
Start: 2021-11-29 | End: 2021-12-05 | Stop reason: HOSPADM

## 2021-11-29 RX ORDER — METHYLPREDNISOLONE SODIUM SUCCINATE 125 MG/2ML
125 INJECTION, POWDER, LYOPHILIZED, FOR SOLUTION INTRAMUSCULAR; INTRAVENOUS ONCE
Status: COMPLETED | OUTPATIENT
Start: 2021-11-29 | End: 2021-11-29

## 2021-11-29 RX ORDER — IPRATROPIUM BROMIDE AND ALBUTEROL SULFATE 2.5; .5 MG/3ML; MG/3ML
SOLUTION RESPIRATORY (INHALATION)
Status: DISCONTINUED
Start: 2021-11-29 | End: 2021-11-29 | Stop reason: HOSPADM

## 2021-11-29 RX ORDER — ALBUTEROL SULFATE 0.83 MG/ML
2.5 SOLUTION RESPIRATORY (INHALATION)
Status: DISCONTINUED | OUTPATIENT
Start: 2021-11-29 | End: 2021-12-05 | Stop reason: HOSPADM

## 2021-11-29 RX ORDER — ONDANSETRON 2 MG/ML
4 INJECTION INTRAMUSCULAR; INTRAVENOUS EVERY 6 HOURS PRN
Status: DISCONTINUED | OUTPATIENT
Start: 2021-11-29 | End: 2021-12-05 | Stop reason: HOSPADM

## 2021-11-29 RX ORDER — ACETAMINOPHEN 650 MG/1
650 SUPPOSITORY RECTAL EVERY 4 HOURS PRN
Status: DISCONTINUED | OUTPATIENT
Start: 2021-11-29 | End: 2021-12-05 | Stop reason: HOSPADM

## 2021-11-29 RX ORDER — AMOXICILLIN 250 MG
1 CAPSULE ORAL 2 TIMES DAILY PRN
Status: DISCONTINUED | OUTPATIENT
Start: 2021-11-29 | End: 2021-12-05 | Stop reason: HOSPADM

## 2021-11-29 RX ORDER — DOXYCYCLINE 100 MG/1
100 CAPSULE ORAL 2 TIMES DAILY
Status: COMPLETED | OUTPATIENT
Start: 2021-11-29 | End: 2021-12-02

## 2021-11-29 RX ADMIN — IPRATROPIUM BROMIDE AND ALBUTEROL SULFATE 3 ML: .5; 3 SOLUTION RESPIRATORY (INHALATION) at 14:40

## 2021-11-29 RX ADMIN — IPRATROPIUM BROMIDE AND ALBUTEROL SULFATE 3 ML: .5; 3 SOLUTION RESPIRATORY (INHALATION) at 19:57

## 2021-11-29 RX ADMIN — METOPROLOL SUCCINATE 50 MG: 50 TABLET, EXTENDED RELEASE ORAL at 16:10

## 2021-11-29 RX ADMIN — IPRATROPIUM BROMIDE AND ALBUTEROL SULFATE 9 ML: 2.5; .5 SOLUTION RESPIRATORY (INHALATION) at 08:02

## 2021-11-29 RX ADMIN — METHYLPREDNISOLONE SODIUM SUCCINATE 125 MG: 125 INJECTION, POWDER, FOR SOLUTION INTRAMUSCULAR; INTRAVENOUS at 07:58

## 2021-11-29 RX ADMIN — DOXYCYCLINE HYCLATE 100 MG: 100 CAPSULE ORAL at 19:46

## 2021-11-29 RX ADMIN — ACETAMINOPHEN 650 MG: 325 TABLET, FILM COATED ORAL at 23:57

## 2021-11-29 RX ADMIN — METHYLPREDNISOLONE SODIUM SUCCINATE 40 MG: 40 INJECTION, POWDER, FOR SOLUTION INTRAMUSCULAR; INTRAVENOUS at 19:47

## 2021-11-29 RX ADMIN — GUAIFENESIN 1200 MG: 600 TABLET, EXTENDED RELEASE ORAL at 19:46

## 2021-11-29 RX ADMIN — ACETAMINOPHEN 650 MG: 325 TABLET, FILM COATED ORAL at 14:23

## 2021-11-29 RX ADMIN — IPRATROPIUM BROMIDE AND ALBUTEROL SULFATE 9 ML: .5; 3 SOLUTION RESPIRATORY (INHALATION) at 08:02

## 2021-11-29 ASSESSMENT — ACTIVITIES OF DAILY LIVING (ADL)
ADLS_ACUITY_SCORE: 12
ADLS_ACUITY_SCORE: 12
ADLS_ACUITY_SCORE: 6
ADLS_ACUITY_SCORE: 6
ADLS_ACUITY_SCORE: 12
ADLS_ACUITY_SCORE: 12
ADLS_ACUITY_SCORE: 8
ADLS_ACUITY_SCORE: 12
ADLS_ACUITY_SCORE: 6
ADLS_ACUITY_SCORE: 6
ADLS_ACUITY_SCORE: 12

## 2021-11-29 ASSESSMENT — ENCOUNTER SYMPTOMS
FEVER: 0
COUGH: 1
SHORTNESS OF BREATH: 1

## 2021-11-29 NOTE — PHARMACY-ADMISSION MEDICATION HISTORY
Admission medication history interview status for this patient is complete. See [facility] admission navigator for allergy information, prior to admission medications and immunization status.     Medication history interview done, indicate source(s): Patient  Medication history resources (including written lists, pill bottles, clinic record): SureScripts, Care Everywhere, discharge summary 11/27/21 and medication history note 11/25/21    Changes made to PTA medication list:  Added: albuterol inhaler  Changed: none  Reported as Not Taking: none  Removed: none    Actions taken by pharmacist (provider contacted, etc):None     Additional medication history information: Patient is a reliable historian. Patient confirmed taking combivent respimat, Trelegy inhalers, duoneb, and just picked up albuterol inhaler that she has not yet started taking. All inhalers are present in emergency department and would be available for patient-supplied labeling if reordered while inpatient.     Medication reconciliation/reorder completed by provider prior to medication history?  N     Prior to Admission medications    Medication Sig Last Dose Taking? Auth Provider   albuterol (PROAIR HFA/PROVENTIL HFA/VENTOLIN HFA) 108 (90 Base) MCG/ACT inhaler Inhale 2 puffs into the lungs Take every 4-6 hours as needed  at not yet started Yes Unknown, Entered By History   aspirin (ASA) 81 MG EC tablet Take 81 mg by mouth daily 11/29/2021 at AM Yes Unknown, Entered By History   Aspirin-Caffeine 500-32.5 MG TABS Take 2 tablets by mouth 2 times daily as needed 11/28/2021 at PM Yes Unknown, Entered By History   azithromycin (ZITHROMAX) 250 MG tablet Take 1 tablet (250 mg) by mouth Every Mon, Wed, Fri Morning  at HOLD while taking doxycycline Yes Lowell Duncan MD   calcium carbonate 600 mg-vitamin D 400 units (CALTRATE) 600-400 MG-UNIT per tablet Take 1 tablet by mouth daily (with breakfast) 11/29/2021 at AM Yes Unknown, Entered By History   doxycycline hyclate  (VIBRAMYCIN) 100 MG capsule Take 1 capsule (100 mg) by mouth 2 times daily for 7 days 11/29/2021 at AM Yes Lowell Duncan MD   Fluticasone-Umeclidin-Vilanterol (TRELEGY ELLIPTA) 100-62.5-25 MCG/INH oral inhaler Inhale 1 puff into the lungs daily 11/29/2021 at AM Yes Unknown, Entered By History   ipratropium - albuterol 0.5 mg/2.5 mg/3 mL (DUONEB) 0.5-2.5 (3) MG/3ML neb solution Inhale 3 mLs into the lungs every 6 hours as needed 11/29/2021 at 0400 Yes Unknown, Entered By History   ipratropium-albuterol (COMBIVENT RESPIMAT)  MCG/ACT inhaler Inhale 1 puff into the lungs 4 times daily as needed Past Week at Unknown time Yes Unknown, Entered By History   metoprolol succinate ER (TOPROL-XL) 50 MG 24 hr tablet Take 50 mg by mouth 2 times daily 11/29/2021 at AM Yes Unknown, Entered By History   multivitamin w/minerals (MULTIVITAMIN, THERAPEUTIC WITH MINERALS) tablet Take 1 tablet by mouth Every Mon, Wed, Fri Morning 11/29/2021 at AM Yes Unknown, Entered By History   predniSONE (DELTASONE) 20 MG tablet Take 2 tablets (40 mg) by mouth daily for 4 days 11/29/2021 at AM Yes Lowell Duncan MD   sertraline (ZOLOFT) 25 MG tablet Take 25 mg by mouth daily 11/29/2021 at AM Yes Unknown, Entered By History       Paula Loo, PharmD

## 2021-11-29 NOTE — ED NOTES
RECEIVING UNIT ED HANDOFF REVIEW    Above ED Nurse Handoff Report was reviewed: Yes  Reviewed by: Sharon Waggoner RN on November 29, 2021 at 4:32 PM   [facility]  ED Nurse Handoff Report    Pooja Trinidad is a 66 year old female   ED Chief complaint: Shortness of Breath  . ED Diagnosis:   Final diagnoses:   COPD exacerbation (H)     Allergies:   Allergies   Allergen Reactions     Nickel Rash     Contact dermatitis   Contact dermatitis   Contact dermatitis   Contact dermatitis          Code Status: Full Code  Activity level - Baseline/Home:  Independent. Activity Level - Current:   Stand by Assist. Lift room needed: No. Bariatric: No   Needed: No   Isolation: No. Infection: Not Applicable.     Vital Signs:   Vitals:    11/29/21 0741   BP: (!) 165/100   Pulse: 96   Resp: 18   Temp: 99.2  F (37.3  C)   TempSrc: Temporal   SpO2: 93%       Cardiac Rhythm:  ,      Pain level:    Patient confused: No. Patient Falls Risk: Yes.   Elimination Status: Has voided   Patient Report - Initial Complaint: SOB. Focused Assessment: SOB, Tripoding, generalized weakness, tachypnea   Tests Performed:   Labs Ordered and Resulted from Time of ED Arrival to Time of ED Departure   CBC WITH PLATELETS AND DIFFERENTIAL - Abnormal       Result Value    WBC Count 13.5 (*)     RBC Count 4.47      Hemoglobin 14.9      Hematocrit 46.5       (*)     MCH 33.3 (*)     MCHC 32.0      RDW 12.3      Platelet Count 399      % Neutrophils 75      % Lymphocytes 15      % Monocytes 9      % Eosinophils 0      % Basophils 1      % Immature Granulocytes 0      NRBCs per 100 WBC 0      Absolute Neutrophils 10.1 (*)     Absolute Lymphocytes 2.0      Absolute Monocytes 1.2      Absolute Eosinophils 0.0      Absolute Basophils 0.1      Absolute Immature Granulocytes 0.1 (*)     Absolute NRBCs 0.0     ISTAT GASES LACTATE VENOUS POCT - Abnormal    Lactic Acid POCT 1.4      Bicarbonate Venous POCT 31 (*)     O2 Sat, Venous  POCT 61 (*)     pCO2V Venous POCT 56 (*)     pH Venous POCT 7.35      pO2 Venous POCT 34     BASIC METABOLIC PANEL - Normal    Sodium 140      Potassium 3.9      Chloride 105      Carbon Dioxide (CO2) 28      Anion Gap 7      Urea Nitrogen 23      Creatinine 0.57      Calcium 9.5      Glucose 89      GFR Estimate >90     INFLUENZA A/B & SARS-COV2 PCR MULTIPLEX     XR Chest Port 1 View   Preliminary Result   IMPRESSION: No significant interval change. Mild scarring and/or   atelectasis at both lung bases. No pneumothorax. Previously noted   possible nipple shadow overlying the left lung base is unchanged. A   short-term follow-up chest x-ray with nipple markers or chest CT may   be helpful to exclude a pulmonary nodule. Heart size is stable.   Pulmonary vascularity is within normal limits.        . Abnormal Results: See above.   Treatments provided: Oxygen, Duonebs, solumedrol, diagnostics, Listening to funny jokes  Family Comments: Daughter at bedside  OBS brochure/video discussed/provided to patient:  Yes  ED Medications:   Medications   ipratropium - albuterol 0.5 mg/2.5 mg/3 mL (DUONEB) neb solution 9 mL (9 mLs Nebulization Given 11/29/21 0802)   methylPREDNISolone sodium succinate (solu-MEDROL) injection 125 mg (125 mg Intravenous Given 11/29/21 0758)     Drips infusing:  No  For the majority of the shift, the patient's behavior Green. Interventions performed were N/A.    Sepsis treatment initiated: No     Patient tested for COVID 19 prior to admission: YES    ED Nurse Name/Phone Number: Arnoldo Donnelly RN,   9:12 AM

## 2021-11-29 NOTE — ED PROVIDER NOTES
History   Chief Complaint:  Shortness of Breath     The history is provided by the patient.      Pooja Trinidad is a 66 year old female with history of COPD who presents with shortness of breath.  Patient was admitted on November 25-27th for COPD exacerbation with negative COVID swab.  She was discharged home on doxycycline and prednisone which she has been compliant with.  She had been doing well up until about 5 AM this morning when she became acutely short of breath.  Her shortness of breath is severe, aggravated by any exertion and lying flat.  There are no alleviating factors.  She denies any fever or chest pain.  She does have a mild productive cough.    Review of Systems   Constitutional: Negative for fever.   Respiratory: Positive for cough and shortness of breath.    Cardiovascular: Negative for chest pain.   All other systems reviewed and are negative.      Allergies:  Nickel     Medications:  Aspirin 81 mg  Caltrate   Vibramycin  Trelegy ellipta  Duoneb  Combivent respimat  Toprol-xl  Deltasone  Zoloft   Proventil   Zithromax     Past Medical History:     COPD  Acute on chronic respiratory failure w/ hypoxia   Cerebral hemorrhage  Cerebral arteriovenous malformation  Paroxysmal atrial fibrillation  SVT  Depression  Anxiety   Osteoarthritis   Hypertension   Pneumonia  Spinal stenosis   Microcytic anemia     Past Surgical History:    Femur fracture surgery   Total hip x2  Ir ablation  Colonoscopy   Cerebral embolization  Laminectomy knee surgery   Epidural block   Carotid angiogram    Eardrum revision   Tonsillectomy   Adenoidectomy     Family History:    Mother: cardiovascular disease, osteoporosis     Social History:  Presents to ED alone    Physical Exam     Patient Vitals for the past 24 hrs:   BP Temp Temp src Pulse Resp SpO2   11/29/21 0741 (!) 165/100 99.2  F (37.3  C) Temporal 96 18 93 %       Physical Exam    General:    female in mild respiratory distress  HEENT:    Oropharynx is  moist  Eyes:    Conjunctiva normal  Neck:    Supple, no meningismus.     CV:     Regular rate and rhythm.      No murmurs, rubs or gallops.       No unilateral leg swelling.       2+ radial pulses bilateral.       No lower extremity edema.  PULM:    Diffuse expiratory wheezing.       Mild respiratory distress and tachypnea.      Diminished air exchange.     Prolonged expiratory phase.     Speaks in 2-3 word sentences.     No rales or rhonci.     No stridor.  ABD:    Soft, non-tender, non-distended.       No rebound, guarding or rigidity.  MSK:     No gross deformity to all four extremities.   LYMPH:   No cervical lymphadenopathy.  NEURO:   Alert     Speech is clear     Good muscle tone  Skin:    Warm, dry and intact.    Psych:    Mood is good and affect is appropriate.          Emergency Department Course   ECG  ECG obtained at 0743, ECG read at 0746  Normal sinus rhythm  Normal ECG   No significant change as compared to prior, dated 11/25/21.  Rate 90 bpm. NE interval 148 ms. QRS duration 80 ms. QT/QTc 344/420 ms. P-R-T axes 84 17 72.     Imaging:  XR Chest Port 1 View   Preliminary Result   IMPRESSION: No significant interval change. Mild scarring and/or   atelectasis at both lung bases. No pneumothorax. Previously noted   possible nipple shadow overlying the left lung base is unchanged. A   short-term follow-up chest x-ray with nipple markers or chest CT may   be helpful to exclude a pulmonary nodule. Heart size is stable.   Pulmonary vascularity is within normal limits.      Report per radiology    Laboratory:  Labs Ordered and Resulted from Time of ED Arrival to Time of ED Departure   CBC WITH PLATELETS AND DIFFERENTIAL - Abnormal       Result Value    WBC Count 13.5 (*)     RBC Count 4.47      Hemoglobin 14.9      Hematocrit 46.5       (*)     MCH 33.3 (*)     MCHC 32.0      RDW 12.3      Platelet Count 399      % Neutrophils 75      % Lymphocytes 15      % Monocytes 9      % Eosinophils 0      %  Basophils 1      % Immature Granulocytes 0      NRBCs per 100 WBC 0      Absolute Neutrophils 10.1 (*)     Absolute Lymphocytes 2.0      Absolute Monocytes 1.2      Absolute Eosinophils 0.0      Absolute Basophils 0.1      Absolute Immature Granulocytes 0.1 (*)     Absolute NRBCs 0.0     ISTAT GASES LACTATE VENOUS POCT - Abnormal    Lactic Acid POCT 1.4      Bicarbonate Venous POCT 31 (*)     O2 Sat, Venous POCT 61 (*)     pCO2V Venous POCT 56 (*)     pH Venous POCT 7.35      pO2 Venous POCT 34     BASIC METABOLIC PANEL - Normal    Sodium 140      Potassium 3.9      Chloride 105      Carbon Dioxide (CO2) 28      Anion Gap 7      Urea Nitrogen 23      Creatinine 0.57      Calcium 9.5      Glucose 89      GFR Estimate >90        Emergency Department Course:  Reviewed:  I reviewed nursing notes, vitals, past medical history and Care Everywhere    Assessments:  0740 I obtained history and examined the patient as noted above.   0849 I rechecked the patient and explained findings. Resolution of respiratory distress and tachypnea. On 4L at 93%. Mild to moderate residual wheezing.     Consults:  909 I spoke with Zuleika Zayas PA-C for Dr. Alonso from the Hospitalist service regarding patient's presentation, findings, and plan of care.     Interventions:  0758 Solu-medrol, 125 mg, IV  0802 Duoneb, 9 mL, Nebulization     Disposition:  The patient was admitted to the hospital under the care of Dr. Alonso.     Impression & Plan       Medical Decision Makin-year-old female recently admitted for COPD exacerbation presents to the ED with shortness of breath.  Her evaluation is consistent with COPD exacerbation.  She responded quite well to bronchodilators and steroids.  On reexamination, there is resolution of respiratory distress and tachypnea.  Chest x-ray without pneumonia, pneumothorax, pulmonary edema.  She had a recent negative COVID swab.  She has no features to suggest pulmonary embolism.  She is having increasing  oxygen requirements up to 3 L at this time.  Patient clearly unfit to be discharged home as she is failing outpatient management with oral prednisone.  Patient will be transferred to medical bed.    Diagnosis:    ICD-10-CM    1. COPD exacerbation (H)  J44.1        Scribe Disclosure:  Jalil RENTERIA, am serving as a scribe at 7:39 AM on 11/29/2021 to document services personally performed by Kota Bhatia MD based on my observations and the provider's statements to me.            Kota Bhatia MD  11/29/21 0914

## 2021-11-29 NOTE — PROGRESS NOTES
Clinic Care Coordination Contact    Background: Care Coordination referral placed from [facility] discharge report for reason of patient meeting criteria for a TCM outreach call by [facility] team.    Assessment: Upon chart review, [facility] Team member will cancel/close the referral for TCM outreach due to reason below:    Patient has presented to Emergency Department or has been readmitted to hospital    Plan: Care Coordination referral for TCM outreach canceled.    Tracy Kelley MA  [facility]

## 2021-11-29 NOTE — H&P
[facility]  Admission History and Physical Examination    NAME: Pooja Trinidad   : 1955   MRN: [id number]     Date of Admission:  2021    Assessment & Plan   Pooja Trinidad is a 66 year old vaccinated female with past medical history significant for COPD (chronic 2 L O2 at night), depression, hypertension, paroxysmal atrial fibrillation, intracerebral hemorrhage, anxiety and hypertension, who was just admitted to [facility] (-) for COPD exacerbation (Covid negative).   She was sent home with 7 days of doxycycline and 4 more days of prednisone, and instruction for 1 L of O2 during the day and resuming 2 L of O2 at night.   She was doing fairly well until this morning she acutely felt short of breath, she was quite wheezy and had difficulty catching her breath.  O2 sat went to the mid 80s which prompted her to come into the emergency room.    Upon initial arrival to the emergency room she was quite tachypneic and was saturating in the mid 80s.  Chest x-ray showed no significant interval change except for atelectasis at lung bases, and no pneumonia.  ABG performed showed very mild PCO2 elevation at 56 and excess bicarb 31 but normal pH at 7.35.  WBCs mildly elevated 13.5 but likely related from steroid use otherwise the rest of her labs were unremarkable.  She was given IV steroids along with 3 nebulizer and had improved symptoms.  She is now saturating 95% on 3 L facemask.  She is recommended for admission to the hospital for acute COPD exacerbation.       #Acute hypoxic respiratory failure due to COPD exacerbation   -Just discharged 2 days ago for COPD exacerbation, was doing well but had acute shortness of breath and hypoxia this morning  -No new findings concerning for infectious etiology, chest x-ray negative for pneumonia.    I suspect she just needs more time with IV steroids and aggressive nebs  -Symptoms improved after ED treatment, will continue IV Solu-Medrol 40  "mg IV twice daily  -Duo nebs every 4 hours for now and albuterol every 2 as needed  -Okay to resume home trilogy inhaler  -Continue doxycycline, she has 5 more days left (out of 7), no need to escalate antibiotic treatment for pneumonia at this time  -Start Mucinex twice daily given complaints of phlegm  -Aggressive incentive spirometry    #History of paroxysmal atrial fib  #Hypertension  -No reoccurrence recently, EKG shows normal sinus rhythm  -Last ECHO 9/2021 normal EF 60% Gd 1 Diastolic dysfxn  -Resume metoprolol as well as baby aspirin    #Depression/anxiety  -Continue Zoloft    #Tobacco abuse  - she is a smoker of 1.5 packs/day for 45 years  - She states her last cigarette was last Thursday and she \"quit for good\"  -Declined nicotine patch    #History of brain aneurysm 2012--remote  -S/p microsurgical resection of a complex inpatient AVM 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     DVT Prophylaxis: Low Risk/Ambulatory with no VTE prophylaxis indicated  Code Status: Full Code  COVID PCR TESTING STATUS: Negative, Asymptomatic. No precautions.       Expected discharge:  Unclear at least >2 mn     Zuleika Zayas PA-C    Primary Care Physician   Nino Mac    Chief Complaint   SOB, wheezing and hypoxia     History is obtained from the patient and ED doc.     History of Present Illness   Pooja Trinidad is a 66 year old vaccinated female with past medical history significant for COPD (chronic 2 L O2 at night), depression, hypertension, paroxysmal atrial fibrillation, intracerebral hemorrhage, anxiety and hypertension, who was just admitted to [facility] (11/25-11/27) for COPD exacerbation (Covid negative).   She was sent home with 7 days of doxycycline and 4 more days of prednisone, and instruction for 1 L " of O2 during the day and resuming 2 L of O2 at night.   She was doing fairly well until this morning she acutely felt short of breath, she was quite wheezy and had difficulty catching her breath.  O2 sat went to the mid 80s which prompted her to come into the emergency room.  She was hospitalized at [facility] in September for 5 days related to a COPD exacerbation.  Has never been intubated for a COPD exacerbation    Upon initial arrival to the emergency room she was quite tachypneic and was saturating in the mid 80s.  Chest x-ray showed no significant interval change except for atelectasis at lung bases, and no pneumonia.  ABG performed showed very mild PCO2 elevation at 56 and excess bicarb 31 but normal pH at 7.35.  WBCs mildly elevated 13.5 but likely related from steroid use otherwise the rest of her labs were unremarkable.  She was given IV steroids along with 3 nebulizer and had improved symptoms.  She is now saturating 95% on 3 L facemask.  She is recommended for admission to the hospital for acute COPD exacerbation.     Past Medical History    I have reviewed this patient's medical history and updated it with pertinent information if needed.     Past Surgical History   I have reviewed this patient's surgical history and updated it with pertinent information if needed.  Past Surgical History:   Procedure Laterality Date     IR CAROTID ANGIOGRAM  3/5/2012     IR CAROTID ANGIOGRAM  3/5/2012     IR CAROTID ANGIOGRAM  3/6/2012     IR MISCELLANEOUS PROCEDURE  3/5/2012     IR MISCELLANEOUS PROCEDURE  3/5/2012     IR MISCELLANEOUS PROCEDURE  3/6/2012     IR MISCELLANEOUS PROCEDURE  3/6/2012       Prior to Admission Medications   Prior to Admission Medications   Prescriptions Last Dose Informant Patient Reported? Taking?   Aspirin-Caffeine 500-32.5 MG TABS   Yes No   Sig: Take 2 tablets by mouth 2 times daily as needed   Fluticasone-Umeclidin-Vilanterol (TRELEGY ELLIPTA) 100-62.5-25 MCG/INH oral  inhaler   Yes No   Sig: Inhale 1 puff into the lungs daily   aspirin (ASA) 81 MG EC tablet   Yes No   Sig: Take 81 mg by mouth daily   azithromycin (ZITHROMAX) 250 MG tablet   No No   Sig: Take 1 tablet (250 mg) by mouth Every Mon, Wed, Fri Morning   calcium carbonate 600 mg-vitamin D 400 units (CALTRATE) 600-400 MG-UNIT per tablet   Yes No   Sig: Take 1 tablet by mouth daily (with breakfast)   doxycycline hyclate (VIBRAMYCIN) 100 MG capsule   No No   Sig: Take 1 capsule (100 mg) by mouth 2 times daily for 7 days   ipratropium - albuterol 0.5 mg/2.5 mg/3 mL (DUONEB) 0.5-2.5 (3) MG/3ML neb solution   Yes No   Sig: Inhale 3 mLs into the lungs every 6 hours as needed   ipratropium-albuterol (COMBIVENT RESPIMAT)  MCG/ACT inhaler   Yes No   Sig: Inhale 1 puff into the lungs 4 times daily as needed   metoprolol succinate ER (TOPROL-XL) 50 MG 24 hr tablet   Yes No   Sig: Take 50 mg by mouth 2 times daily   multivitamin w/minerals (MULTIVITAMIN, THERAPEUTIC WITH MINERALS) tablet   Yes No   Sig: Take 1 tablet by mouth Every Mon, Wed, Fri Morning   predniSONE (DELTASONE) 20 MG tablet   No No   Sig: Take 2 tablets (40 mg) by mouth daily for 4 days   sertraline (ZOLOFT) 25 MG tablet   Yes No   Sig: Take 25 mg by mouth daily      Facility-Administered Medications: None     Allergies   Allergies   Allergen Reactions     Nickel Rash     Contact dermatitis   Contact dermatitis   Contact dermatitis   Contact dermatitis          Social History   I have reviewed this patient's social history and updated it with pertinent information if needed. Pooja Trinidad      Family History   I have reviewed this patient's family history and updated it with pertinent information if needed.   No family history on file.    Review of Systems   The 10 point Review of Systems is negative other than noted in the HPI or here.     Physical Exam   Temp: 99.2  F (37.3  C) Temp src: Temporal BP: (!) 165/100 Pulse: 103   Resp: 18 SpO2: 96 % O2  Device: Oxymask Oxygen Delivery: 5 LPM  Vital Signs with Ranges  Temp:  [99.2  F (37.3  C)] 99.2  F (37.3  C)  Pulse:  [] 103  Resp:  [18] 18  BP: (165)/(100) 165/100  SpO2:  [93 %-96 %] 96 %  0 lbs 0 oz    Constitutional: Awake, alert,  no apparent distress.  Eyes: Conjunctiva and pupils examined and normal.  HEENT: Moist mucous membranes, normal dentition.  Respiratory: Coarse BS, scattered expiratory wheezing, no crackles or rales but no use of his olmesartan countertraction.  Cardiovascular: Regular rate and rhythm, normal S1 and S2, and no murmur noted.  GI: Soft, non-distended, non-tender, bowel sounds present. No rebound tenderness or guarding.  Lymph/Hematologic: No anterior cervical or supraclavicular adenopathy.  Skin: No rashes, no cyanosis, no edema.  Musculoskeletal: No deformities noted.  No erythema or tenderness. Moving all extremities.  Neurologic: No focal deficits noted. Speech is clear. Coordination and strength grossly normal.   Psychiatric: Appropriate affect.    Data   Data reviewed today:      EKG: Normal sinus rhythm    Imaging:   Recent Results (from the past 24 hour(s))   XR Chest Port 1 View    Narrative    CHEST ONE VIEW PORTABLE November 29, 2021 8:06 AM     HISTORY: Shortness of breath.    COMPARISON: 11/25/2021.      Impression    IMPRESSION: No significant interval change. Mild scarring and/or  atelectasis at both lung bases. No pneumothorax. Previously noted  possible nipple shadow overlying the left lung base is unchanged. A  short-term follow-up chest x-ray with nipple markers or chest CT may  be helpful to exclude a pulmonary nodule. Heart size is stable.  Pulmonary vascularity is within normal limits.       Recent Labs   Lab 11/29/21  0755 11/27/21  0739 11/26/21  0649 11/25/21  2137 11/25/21  1837   WBC 13.5*  --  3.9*  --  9.0   HGB 14.9  --  14.0  --  14.5   *  --  102*  --  103*     --  385  --  414     --  141  --  140   POTASSIUM 3.9 4.2 5.1   < >  4.2   CHLORIDE 105  --  108  --  106   CO2 28  --  29  --  32   BUN 23  --  12  --  14   CR 0.57  --  0.48*  --  0.52   ANIONGAP 7  --  4  --  2*   GENIA 9.5  --  9.1  --  9.4   GLC 89  --  143*  --  108*   TROPONIN  --   --   --   --  <0.015    < > = values in this interval not displayed.           Zuleika Zayas PA-C  [facility]  November 29, 2021  Securely message with the Vocera Web Console (learn more here)  Text page via [facility] Paging/Directory

## 2021-11-29 NOTE — ED TRIAGE NOTES
Patient presents to the ED with SOB. Discharged from the hospital on Saturday following a COPD admission. States shortness of breath got worse this morning.

## 2021-11-30 LAB
ANION GAP SERPL CALCULATED.3IONS-SCNC: 2 MMOL/L (ref 3–14)
BUN SERPL-MCNC: 17 MG/DL (ref 7–30)
CALCIUM SERPL-MCNC: 9.1 MG/DL (ref 8.5–10.1)
CHLORIDE BLD-SCNC: 105 MMOL/L (ref 94–109)
CO2 SERPL-SCNC: 33 MMOL/L (ref 20–32)
CREAT SERPL-MCNC: 0.55 MG/DL (ref 0.52–1.04)
ERYTHROCYTE [DISTWIDTH] IN BLOOD BY AUTOMATED COUNT: 12.1 % (ref 10–15)
GFR SERPL CREATININE-BSD FRML MDRD: >90 ML/MIN/1.73M2
GLUCOSE BLD-MCNC: 99 MG/DL (ref 70–99)
HCT VFR BLD AUTO: 43.8 % (ref 35–47)
HGB BLD-MCNC: 14.3 G/DL (ref 11.7–15.7)
MCH RBC QN AUTO: 33.6 PG (ref 26.5–33)
MCHC RBC AUTO-ENTMCNC: 32.6 G/DL (ref 31.5–36.5)
MCV RBC AUTO: 103 FL (ref 78–100)
PLATELET # BLD AUTO: 375 10E3/UL (ref 150–450)
POTASSIUM BLD-SCNC: 4.2 MMOL/L (ref 3.4–5.3)
RBC # BLD AUTO: 4.26 10E6/UL (ref 3.8–5.2)
SODIUM SERPL-SCNC: 140 MMOL/L (ref 133–144)
WBC # BLD AUTO: 9.3 10E3/UL (ref 4–11)

## 2021-11-30 PROCEDURE — 99233 SBSQ HOSP IP/OBS HIGH 50: CPT | Performed by: INTERNAL MEDICINE

## 2021-11-30 PROCEDURE — 85027 COMPLETE CBC AUTOMATED: CPT | Performed by: PHYSICIAN ASSISTANT

## 2021-11-30 PROCEDURE — 250N000013 HC RX MED GY IP 250 OP 250 PS 637: Performed by: PHYSICIAN ASSISTANT

## 2021-11-30 PROCEDURE — 250N000013 HC RX MED GY IP 250 OP 250 PS 637: Performed by: INTERNAL MEDICINE

## 2021-11-30 PROCEDURE — 120N000001 HC R&B MED SURG/OB

## 2021-11-30 PROCEDURE — 94640 AIRWAY INHALATION TREATMENT: CPT

## 2021-11-30 PROCEDURE — 250N000009 HC RX 250: Performed by: INTERNAL MEDICINE

## 2021-11-30 PROCEDURE — 80048 BASIC METABOLIC PNL TOTAL CA: CPT | Performed by: PHYSICIAN ASSISTANT

## 2021-11-30 PROCEDURE — 250N000011 HC RX IP 250 OP 636: Performed by: PHYSICIAN ASSISTANT

## 2021-11-30 PROCEDURE — 36415 COLL VENOUS BLD VENIPUNCTURE: CPT | Performed by: PHYSICIAN ASSISTANT

## 2021-11-30 PROCEDURE — 94640 AIRWAY INHALATION TREATMENT: CPT | Mod: 76

## 2021-11-30 PROCEDURE — 999N000157 HC STATISTIC RCP TIME EA 10 MIN

## 2021-11-30 RX ADMIN — IPRATROPIUM BROMIDE AND ALBUTEROL SULFATE 3 ML: .5; 3 SOLUTION RESPIRATORY (INHALATION) at 16:20

## 2021-11-30 RX ADMIN — IPRATROPIUM BROMIDE AND ALBUTEROL SULFATE 3 ML: .5; 3 SOLUTION RESPIRATORY (INHALATION) at 00:11

## 2021-11-30 RX ADMIN — METOPROLOL SUCCINATE 50 MG: 50 TABLET, EXTENDED RELEASE ORAL at 09:27

## 2021-11-30 RX ADMIN — DOXYCYCLINE HYCLATE 100 MG: 100 CAPSULE ORAL at 20:07

## 2021-11-30 RX ADMIN — IPRATROPIUM BROMIDE AND ALBUTEROL SULFATE 3 ML: .5; 3 SOLUTION RESPIRATORY (INHALATION) at 19:15

## 2021-11-30 RX ADMIN — METHYLPREDNISOLONE SODIUM SUCCINATE 40 MG: 40 INJECTION, POWDER, FOR SOLUTION INTRAMUSCULAR; INTRAVENOUS at 09:27

## 2021-11-30 RX ADMIN — IPRATROPIUM BROMIDE AND ALBUTEROL SULFATE 3 ML: .5; 3 SOLUTION RESPIRATORY (INHALATION) at 23:13

## 2021-11-30 RX ADMIN — FLUTICASONE FUROATE AND VILANTEROL TRIFENATATE 1 PUFF: 100; 25 POWDER RESPIRATORY (INHALATION) at 08:12

## 2021-11-30 RX ADMIN — METHYLPREDNISOLONE SODIUM SUCCINATE 40 MG: 40 INJECTION, POWDER, FOR SOLUTION INTRAMUSCULAR; INTRAVENOUS at 20:09

## 2021-11-30 RX ADMIN — ASPIRIN 81 MG: 81 TABLET, COATED ORAL at 09:28

## 2021-11-30 RX ADMIN — IPRATROPIUM BROMIDE AND ALBUTEROL SULFATE 3 ML: .5; 3 SOLUTION RESPIRATORY (INHALATION) at 11:53

## 2021-11-30 RX ADMIN — IPRATROPIUM BROMIDE AND ALBUTEROL SULFATE 3 ML: .5; 3 SOLUTION RESPIRATORY (INHALATION) at 04:03

## 2021-11-30 RX ADMIN — GUAIFENESIN 1200 MG: 600 TABLET, EXTENDED RELEASE ORAL at 20:08

## 2021-11-30 RX ADMIN — GUAIFENESIN 1200 MG: 600 TABLET, EXTENDED RELEASE ORAL at 09:27

## 2021-11-30 RX ADMIN — SERTRALINE HYDROCHLORIDE 25 MG: 25 TABLET ORAL at 09:27

## 2021-11-30 RX ADMIN — IPRATROPIUM BROMIDE AND ALBUTEROL SULFATE 3 ML: .5; 3 SOLUTION RESPIRATORY (INHALATION) at 08:12

## 2021-11-30 RX ADMIN — DOXYCYCLINE HYCLATE 100 MG: 100 CAPSULE ORAL at 09:28

## 2021-11-30 RX ADMIN — METOPROLOL SUCCINATE 50 MG: 50 TABLET, EXTENDED RELEASE ORAL at 17:45

## 2021-11-30 ASSESSMENT — ACTIVITIES OF DAILY LIVING (ADL)
ADLS_ACUITY_SCORE: 8

## 2021-11-30 NOTE — PROVIDER NOTIFICATION
11/29/21 1952   RCAT Assessment   Reason for Assessment COPD   Pulmonary Status 4   Surgical Status 0   Chest X-ray 4   Respiratory Pattern 2   Mental Status 0   Breath Sounds 4   Cough Effectiveness 1   Level of Activity 1   O2 Required for SpO2>=92% 1   Acuity Level (points) 17   Acuity Level  2   Re-eval Interval Guideline Every 3 days   Re-evaluation Date 12/02/21   Clinical Indications/Symptoms   Aerosol Therapy Physician order   Broncho-pulmonary Hygiene Productive cough   Volume Expansion Atelectasis   Aerosol Therapy Plan   RT Treatment Nebulizer   Broncho-Pulmonary Hygiene Plan   Broncho-Pulmonary Hygiene Treatment Coughing techniques   Broncho-Pulm Hygiene Frequency Acuity Level 2 : QID & PRN @noc-Moderate amounts of secretions   Volume Expansion Plan   Volume Expansion Treatment Incentive Spirometer   Volume Expansion Frequency Acuity Level 2 : QID-High risk for/with persistent atelectasis     Duoneb ordered Q4. RCAT re-eval on 12/2/21.    Modesta Bell, RT

## 2021-11-30 NOTE — PLAN OF CARE
[id number] RN    A&O.  VSS on 2L NC.  Denies pain.  CMS intact.  SBA pushing IV pole.  Voiding.  Bowels active.  Expiratory wheezes.  Intermittent cough.  Regular diet.  DuoNeb given Q4 per POC.  Will continue to monitor.

## 2021-11-30 NOTE — PLAN OF CARE
Patient arrived on unit at approximately 1750 on a cart. Walked from cart to bed with stand by assist using gait belt. Is A&Ox4. Patient wanted tylenol ordered for a headache order obtained. Is on 4L of oxygen via NC. Has expiratory wheezing, is getting scheduled nebs. Tolerated a regular diet. Is voiding adequately. Will continue to monitor.

## 2021-11-30 NOTE — CONSULTS
Care Management Note    Discharge Date: 12/02/2021       Discharge Disposition: Home    Discharge Services:      Discharge DME: Oxygen    Discharge Transportation: family or friend will provide    Private pay costs discussed: Not applicable      Handoff Referral Completed: Yes    Additional Information:  Pt readmission of less than 48 hours for COPD exacerbation. On her last admission COPD and smoking cessation education were discussed and reviewed with the pt. Pt uses home oxygen continuously at 1 liter, she has a portable tank as well that is able to go up to 5 liters. [employer] [phone] (f) [phone].    Will follow with discharge needs.    Corrina Wick RN, BSN CTS  Care Coordinator  [facility]   [phone]

## 2021-11-30 NOTE — PROGRESS NOTES
[facility]  Hospitalist Progress Note  Pranay Alonso MD 11/30/21    Reason for Stay (Diagnosis): copd         Assessment and Plan:      Summary of Stay: Pooja Trinidad is a 66 year old vaccinated female with past medical history significant for COPD (chronic 2 L O2 at night), depression, hypertension, paroxysmal atrial fibrillation, intracerebral hemorrhage, anxiety and hypertension, who was just admitted to [facility] (11/25-11/27) for COPD exacerbation (Covid negative).   She was sent home with 7 days of doxycycline and 4 more days of prednisone, and instruction for 1 L of O2 during the day and resuming 2 L of O2 at night.  She returned to the ER 11/29.     Upon initial arrival to the emergency room she was quite tachypneic and was saturating in the mid 80s.  Chest x-ray showed no significant interval change except for atelectasis at lung bases, and no pneumonia.  ABG performed showed very mild PCO2 elevation at 56 and excess bicarb 31 but normal pH at 7.35.  WBCs mildly elevated 13.5 but likely related from steroid use otherwise the rest of her labs were unremarkable.  She was given IV steroids along with 3 nebulizer and had improved symptoms.  She is now saturating 95% on 3 L facemask.  She was admitted to the hospital for acute COPD exacerbation.         #Acute hypoxic respiratory failure due to COPD exacerbation   -Just discharged 2 days prior to this admission for COPD exacerbation  -No new findings concerning for infectious etiology, chest x-ray negative for pneumonia.    -will continue IV Solu-Medrol 40 mg IV twice daily  -Duo nebs every 4 hours for now and albuterol every 2 as needed  -Okay to resume home trelegy inhaler  -Continue doxycycline, complete previously prescribed course.  -Started Mucinex twice daily given complaints of phlegm  -Aggressive incentive spirometry, flutter valve.  -likely will need to be on home O2, was on 1LPM during the day and 2LPM at night after her  "last discharge.     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 No precautions.               Interval History (Subjective):      Still on 2-3 LPM, very short of breath when up  Adding flutter valve  Feels better overall  Updated her daughter at the bedside.                  Physical Exam:      Last Vital Signs:  BP (!) 160/84 (BP Location: Left arm)   Pulse 70   Temp 96.8  F (36  C) (Temporal)   Resp 19   SpO2 94%       Intake/Output Summary (Last 24 hours) at 11/30/2021 0935  Last data filed at 11/30/2021 0900  Gross per 24 hour   Intake 480 ml   Output 1300 ml   Net -820 ml       General: Alert, awake, no acute distress.  HEENT: NC/AT, eyes anicteric, external occular movements intact, face symmetric.  Dentition WNL, MM moist.  Cardiac: RRR, S1, S2.  No murmurs appreciated.  Pulmonary: Normal chest rise, normal work of breathing.  Lungs CTA BL  Abdomen: soft, non-tender, non-distended.  Bowel Sounds Present.  No guarding.  Extremities: no deformities.  Warm, well perfused.  Skin: no rashes or lesions noted.  Warm and Dry.  Neuro: No focal deficits noted.  Speech clear.  Coordination and strength grossly " normal.  Psych: Appropriate affect.         Medications:      All current medications were reviewed with changes reflected in problem list.         Data:      All new lab and imaging data was reviewed.   Labs:  Recent Labs   Lab 11/30/21  0732      POTASSIUM 4.2   CHLORIDE 105   CO2 33*   ANIONGAP 2*   GLC 99   BUN 17   CR 0.55   GFRESTIMATED >90   GENIA 9.1     Recent Labs   Lab 11/30/21  0732   WBC 9.3   HGB 14.3   HCT 43.8   *         Imaging:   Recent Results (from the past 48 hour(s))   XR Chest Port 1 View    Narrative    CHEST ONE VIEW PORTABLE November 29, 2021 8:06 AM     HISTORY: Shortness of breath.    COMPARISON: 11/25/2021.      Impression    IMPRESSION: No significant interval change. Mild scarring and/or  atelectasis at both lung bases. No pneumothorax. Previously noted  possible nipple shadow overlying the left lung base is unchanged. A  short-term follow-up chest x-ray with nipple markers or chest CT may  be helpful to exclude a pulmonary nodule. Heart size is stable.  Pulmonary vascularity is within normal limits.    NENITA SLAUGHTER MD         SYSTEM ID:  [id number]         Pranay Alonso MD , MD.

## 2021-11-30 NOTE — PLAN OF CARE
A&O x4. VSS. Lungs clear and diminished at the bases. 93% 2 L oxygen via NS. Dyspnea on exertion. CMS intact. Tolerating regular diet. Voids spontaneously. Independent. Ambulated in hallway. Denies pain. Taking Vibramycin. Plan is to discharge home with oxygen when medically cleared.

## 2021-12-01 PROCEDURE — 250N000013 HC RX MED GY IP 250 OP 250 PS 637: Performed by: PHYSICIAN ASSISTANT

## 2021-12-01 PROCEDURE — 94640 AIRWAY INHALATION TREATMENT: CPT

## 2021-12-01 PROCEDURE — 120N000001 HC R&B MED SURG/OB

## 2021-12-01 PROCEDURE — 999N000105 HC STATISTIC NO DOCUMENTATION TO SUPPORT CHARGE

## 2021-12-01 PROCEDURE — 94640 AIRWAY INHALATION TREATMENT: CPT | Mod: 76

## 2021-12-01 PROCEDURE — 99233 SBSQ HOSP IP/OBS HIGH 50: CPT | Performed by: INTERNAL MEDICINE

## 2021-12-01 PROCEDURE — 250N000011 HC RX IP 250 OP 636: Performed by: PHYSICIAN ASSISTANT

## 2021-12-01 PROCEDURE — 250N000009 HC RX 250: Performed by: INTERNAL MEDICINE

## 2021-12-01 PROCEDURE — 999N000157 HC STATISTIC RCP TIME EA 10 MIN

## 2021-12-01 RX ADMIN — DOXYCYCLINE HYCLATE 100 MG: 100 CAPSULE ORAL at 19:48

## 2021-12-01 RX ADMIN — METHYLPREDNISOLONE SODIUM SUCCINATE 40 MG: 40 INJECTION, POWDER, FOR SOLUTION INTRAMUSCULAR; INTRAVENOUS at 19:47

## 2021-12-01 RX ADMIN — IPRATROPIUM BROMIDE AND ALBUTEROL SULFATE 3 ML: .5; 3 SOLUTION RESPIRATORY (INHALATION) at 11:34

## 2021-12-01 RX ADMIN — IPRATROPIUM BROMIDE AND ALBUTEROL SULFATE 3 ML: .5; 3 SOLUTION RESPIRATORY (INHALATION) at 23:43

## 2021-12-01 RX ADMIN — DOXYCYCLINE HYCLATE 100 MG: 100 CAPSULE ORAL at 08:07

## 2021-12-01 RX ADMIN — IPRATROPIUM BROMIDE AND ALBUTEROL SULFATE 3 ML: .5; 3 SOLUTION RESPIRATORY (INHALATION) at 19:43

## 2021-12-01 RX ADMIN — METOPROLOL SUCCINATE 50 MG: 50 TABLET, EXTENDED RELEASE ORAL at 08:07

## 2021-12-01 RX ADMIN — IPRATROPIUM BROMIDE AND ALBUTEROL SULFATE 3 ML: .5; 3 SOLUTION RESPIRATORY (INHALATION) at 08:32

## 2021-12-01 RX ADMIN — FLUTICASONE FUROATE AND VILANTEROL TRIFENATATE 1 PUFF: 100; 25 POWDER RESPIRATORY (INHALATION) at 08:32

## 2021-12-01 RX ADMIN — SERTRALINE HYDROCHLORIDE 25 MG: 25 TABLET ORAL at 08:07

## 2021-12-01 RX ADMIN — METHYLPREDNISOLONE SODIUM SUCCINATE 40 MG: 40 INJECTION, POWDER, FOR SOLUTION INTRAMUSCULAR; INTRAVENOUS at 08:08

## 2021-12-01 RX ADMIN — IPRATROPIUM BROMIDE AND ALBUTEROL SULFATE 3 ML: .5; 3 SOLUTION RESPIRATORY (INHALATION) at 16:10

## 2021-12-01 RX ADMIN — ASPIRIN 81 MG: 81 TABLET, COATED ORAL at 08:07

## 2021-12-01 RX ADMIN — IPRATROPIUM BROMIDE AND ALBUTEROL SULFATE 3 ML: .5; 3 SOLUTION RESPIRATORY (INHALATION) at 04:00

## 2021-12-01 RX ADMIN — GUAIFENESIN 1200 MG: 600 TABLET, EXTENDED RELEASE ORAL at 19:47

## 2021-12-01 RX ADMIN — GUAIFENESIN 1200 MG: 600 TABLET, EXTENDED RELEASE ORAL at 08:07

## 2021-12-01 RX ADMIN — METOPROLOL SUCCINATE 50 MG: 50 TABLET, EXTENDED RELEASE ORAL at 17:40

## 2021-12-01 ASSESSMENT — ACTIVITIES OF DAILY LIVING (ADL)
ADLS_ACUITY_SCORE: 8

## 2021-12-01 NOTE — PROGRESS NOTES
[facility]  Hospitalist Progress Note  Pranay Alonso MD 12/01/2021    Reason for Stay (Diagnosis): copd         Assessment and Plan:      Summary of Stay: Pooja Trinidad is a 66 year old vaccinated female with past medical history significant for COPD (chronic 2 L O2 at night), depression, hypertension, paroxysmal atrial fibrillation, intracerebral hemorrhage, anxiety and hypertension, who was just admitted to [facility] (11/25-11/27) for COPD exacerbation (Covid negative).   She was sent home with 7 days of doxycycline and 4 more days of prednisone, and instruction for 1 L of O2 during the day and resuming 2 L of O2 at night.  She returned to the ER 11/29.     Upon initial arrival to the emergency room she was quite tachypneic and was saturating in the mid 80s.  Chest x-ray showed no significant interval change except for atelectasis at lung bases, and no pneumonia.  ABG performed showed very mild PCO2 elevation at 56 and excess bicarb 31 but normal pH at 7.35.  WBCs mildly elevated 13.5 but likely related from steroid use otherwise the rest of her labs were unremarkable.  She was given IV steroids along with 3 nebulizer and had improved symptoms.  She is now admitted to the hospital for acute COPD exacerbation.         #Acute hypoxic respiratory failure due to COPD exacerbation   -Just discharged 2 days prior to this admission for COPD exacerbation  -No new findings concerning for infectious etiology, chest x-ray negative for pneumonia.    -will continue IV Solu-Medrol 40 mg IV twice daily  -Duo nebs every 4 hours for now and albuterol every 2 as needed  -Okay to resume home trelegy inhaler  -Continue doxycycline, complete previously prescribed course.  -Started Mucinex twice daily given complaints of phlegm  -Aggressive incentive spirometry, flutter valve.  -likely will need to be on home O2, was on 1LPM during the day and 2LPM at night after her last discharge.     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 No precautions.    dispo: home in 1-2 days.           Interval History (Subjective):      Still on 2 LPM, very short of breath when up  Continuing IV steroids at least one day more  Feels better overall  Updated her daughter at the bedside.                  Physical Exam:      Last Vital Signs:  BP (!) 145/71   Pulse 81   Temp 97.5  F (36.4  C) (Temporal)   Resp 18   Wt 74.4 kg (164 lb)   SpO2 96%   BMI 27.29 kg/m        Intake/Output Summary (Last 24 hours) at 11/30/2021 0935  Last data filed at 11/30/2021 0900  Gross per 24 hour   Intake 480 ml   Output 1300 ml   Net -820 ml       General: Alert, awake, no acute distress.  HEENT: NC/AT, eyes anicteric, external occular movements intact, face symmetric.   Cardiac: RRR, S1, S2.  No murmurs appreciated.  Pulmonary: Normal chest rise, normal work of breathing. Faint end exp wheeze, mildly reduced air movement.  Abdomen: soft, non-tender, non-distended.  Bowel Sounds Present.  No guarding.  Extremities: no deformities.  Warm, well perfused.  Skin: no rashes or lesions noted.  Warm and Dry.  Neuro: No focal deficits noted.  " Speech clear.  Coordination and strength grossly normal.  Psych: Appropriate affect.         Medications:      All current medications were reviewed with changes reflected in problem list.         Data:      All new lab and imaging data was reviewed.   Labs:  Recent Labs   Lab 11/30/21  0732      POTASSIUM 4.2   CHLORIDE 105   CO2 33*   ANIONGAP 2*   GLC 99   BUN 17   CR 0.55   GFRESTIMATED >90   GENIA 9.1     Recent Labs   Lab 11/30/21  0732   WBC 9.3   HGB 14.3   HCT 43.8   *         Imaging:   Recent Results (from the past 48 hour(s))   XR Chest Port 1 View    Narrative    CHEST ONE VIEW PORTABLE November 29, 2021 8:06 AM     HISTORY: Shortness of breath.    COMPARISON: 11/25/2021.      Impression    IMPRESSION: No significant interval change. Mild scarring and/or  atelectasis at both lung bases. No pneumothorax. Previously noted  possible nipple shadow overlying the left lung base is unchanged. A  short-term follow-up chest x-ray with nipple markers or chest CT may  be helpful to exclude a pulmonary nodule. Heart size is stable.  Pulmonary vascularity is within normal limits.    NENITA SLAUGHTER MD         SYSTEM ID:  [id number]         Pranay Alonso MD , MD.

## 2021-12-01 NOTE — PLAN OF CARE
Patient A&Ox4, denied pain during shift. Is independent in room but needs stand by assist walking in the hallway due to the O2. Is on oxygen 2L per NC.  Had some SOB after walking the hallways. Lungs are coarse, tolerated regular diet. Is voiding adequate amounts. Plan is to discharge home.

## 2021-12-01 NOTE — PLAN OF CARE
Pt has been up independent.  Pt states she has a non productive cough.  Pt states she  is OAKLEY.

## 2021-12-01 NOTE — PLAN OF CARE
Pt up independently. Denies pain. LS coarse and diminished with nonproductive cough. O2 at 2LPM. Dyspneic on exertion. On Solu-Medrol and Doxycycline. Plan is to go home once meets criteria.

## 2021-12-02 ENCOUNTER — APPOINTMENT (OUTPATIENT)
Dept: CT IMAGING | Facility: CLINIC | Age: 66
DRG: 190 | End: 2021-12-02
Attending: INTERNAL MEDICINE
Payer: COMMERCIAL

## 2021-12-02 LAB
ANION GAP SERPL CALCULATED.3IONS-SCNC: 5 MMOL/L (ref 3–14)
BASE EXCESS BLDV CALC-SCNC: 4.8 MMOL/L (ref -7.7–1.9)
BUN SERPL-MCNC: 26 MG/DL (ref 7–30)
CALCIUM SERPL-MCNC: 9.4 MG/DL (ref 8.5–10.1)
CHLORIDE BLD-SCNC: 104 MMOL/L (ref 94–109)
CO2 SERPL-SCNC: 29 MMOL/L (ref 20–32)
CREAT SERPL-MCNC: 0.49 MG/DL (ref 0.52–1.04)
D DIMER PPP FEU-MCNC: 0.61 UG/ML FEU (ref 0–0.5)
GFR SERPL CREATININE-BSD FRML MDRD: >90 ML/MIN/1.73M2
GLUCOSE BLD-MCNC: 108 MG/DL (ref 70–99)
HCO3 BLDV-SCNC: 32 MMOL/L (ref 21–28)
O2/TOTAL GAS SETTING VFR VENT: 96 %
PCO2 BLDV: 54 MM HG (ref 40–50)
PH BLDV: 7.38 [PH] (ref 7.32–7.43)
PO2 BLDV: 58 MM HG (ref 25–47)
POTASSIUM BLD-SCNC: 4.3 MMOL/L (ref 3.4–5.3)
PROCALCITONIN SERPL-MCNC: <0.05 NG/ML
SODIUM SERPL-SCNC: 138 MMOL/L (ref 133–144)

## 2021-12-02 PROCEDURE — 99233 SBSQ HOSP IP/OBS HIGH 50: CPT | Performed by: INTERNAL MEDICINE

## 2021-12-02 PROCEDURE — 250N000013 HC RX MED GY IP 250 OP 250 PS 637: Performed by: PHYSICIAN ASSISTANT

## 2021-12-02 PROCEDURE — 82803 BLOOD GASES ANY COMBINATION: CPT | Performed by: INTERNAL MEDICINE

## 2021-12-02 PROCEDURE — 250N000011 HC RX IP 250 OP 636: Performed by: PHYSICIAN ASSISTANT

## 2021-12-02 PROCEDURE — 85379 FIBRIN DEGRADATION QUANT: CPT | Performed by: INTERNAL MEDICINE

## 2021-12-02 PROCEDURE — 94640 AIRWAY INHALATION TREATMENT: CPT | Mod: 76

## 2021-12-02 PROCEDURE — 999N000157 HC STATISTIC RCP TIME EA 10 MIN

## 2021-12-02 PROCEDURE — 250N000009 HC RX 250: Performed by: INTERNAL MEDICINE

## 2021-12-02 PROCEDURE — 120N000001 HC R&B MED SURG/OB

## 2021-12-02 PROCEDURE — 84145 PROCALCITONIN (PCT): CPT | Performed by: INTERNAL MEDICINE

## 2021-12-02 PROCEDURE — 99207 PR CDG-CUT & PASTE-POTENTIAL IMPACT ON LEVEL: CPT | Performed by: INTERNAL MEDICINE

## 2021-12-02 PROCEDURE — 71250 CT THORAX DX C-: CPT

## 2021-12-02 PROCEDURE — 94640 AIRWAY INHALATION TREATMENT: CPT

## 2021-12-02 PROCEDURE — 250N000013 HC RX MED GY IP 250 OP 250 PS 637: Performed by: INTERNAL MEDICINE

## 2021-12-02 PROCEDURE — 36415 COLL VENOUS BLD VENIPUNCTURE: CPT | Performed by: INTERNAL MEDICINE

## 2021-12-02 PROCEDURE — 82374 ASSAY BLOOD CARBON DIOXIDE: CPT | Performed by: INTERNAL MEDICINE

## 2021-12-02 RX ADMIN — GUAIFENESIN 1200 MG: 600 TABLET, EXTENDED RELEASE ORAL at 19:44

## 2021-12-02 RX ADMIN — METHYLPREDNISOLONE SODIUM SUCCINATE 40 MG: 40 INJECTION, POWDER, FOR SOLUTION INTRAMUSCULAR; INTRAVENOUS at 08:37

## 2021-12-02 RX ADMIN — GUAIFENESIN 1200 MG: 600 TABLET, EXTENDED RELEASE ORAL at 08:37

## 2021-12-02 RX ADMIN — METHYLPREDNISOLONE SODIUM SUCCINATE 40 MG: 40 INJECTION, POWDER, FOR SOLUTION INTRAMUSCULAR; INTRAVENOUS at 19:44

## 2021-12-02 RX ADMIN — IPRATROPIUM BROMIDE AND ALBUTEROL SULFATE 3 ML: .5; 3 SOLUTION RESPIRATORY (INHALATION) at 11:31

## 2021-12-02 RX ADMIN — SERTRALINE HYDROCHLORIDE 25 MG: 25 TABLET ORAL at 08:37

## 2021-12-02 RX ADMIN — FLUTICASONE FUROATE AND VILANTEROL TRIFENATATE 1 PUFF: 100; 25 POWDER RESPIRATORY (INHALATION) at 07:59

## 2021-12-02 RX ADMIN — SENNOSIDES AND DOCUSATE SODIUM 1 TABLET: 50; 8.6 TABLET ORAL at 23:57

## 2021-12-02 RX ADMIN — METOPROLOL SUCCINATE 50 MG: 50 TABLET, EXTENDED RELEASE ORAL at 08:37

## 2021-12-02 RX ADMIN — ASPIRIN 81 MG: 81 TABLET, COATED ORAL at 08:37

## 2021-12-02 RX ADMIN — IPRATROPIUM BROMIDE AND ALBUTEROL SULFATE 3 ML: .5; 3 SOLUTION RESPIRATORY (INHALATION) at 16:48

## 2021-12-02 RX ADMIN — DOXYCYCLINE HYCLATE 100 MG: 100 CAPSULE ORAL at 19:44

## 2021-12-02 RX ADMIN — IPRATROPIUM BROMIDE AND ALBUTEROL SULFATE 3 ML: .5; 3 SOLUTION RESPIRATORY (INHALATION) at 07:57

## 2021-12-02 RX ADMIN — METOPROLOL SUCCINATE 50 MG: 50 TABLET, EXTENDED RELEASE ORAL at 15:58

## 2021-12-02 RX ADMIN — IPRATROPIUM BROMIDE AND ALBUTEROL SULFATE 3 ML: .5; 3 SOLUTION RESPIRATORY (INHALATION) at 03:48

## 2021-12-02 RX ADMIN — DOXYCYCLINE HYCLATE 100 MG: 100 CAPSULE ORAL at 09:21

## 2021-12-02 RX ADMIN — IPRATROPIUM BROMIDE AND ALBUTEROL SULFATE 3 ML: .5; 3 SOLUTION RESPIRATORY (INHALATION) at 19:16

## 2021-12-02 ASSESSMENT — ACTIVITIES OF DAILY LIVING (ADL)
ADLS_ACUITY_SCORE: 8

## 2021-12-02 NOTE — PROGRESS NOTES
CT chest reviewed, no major infiltrate.  Await formal read.  Continue current plan of care.    Pranay Alonso MD

## 2021-12-02 NOTE — PROGRESS NOTES
Neb given with MP  Lungs are CRS, nonproductive cough.  On 2l/m O2. SATs 92%.     BP (!) 140/80 (BP Location: Left arm)   Pulse 72   Temp 97.7  F (36.5  C) (Temporal)   Resp 18   Wt 74.4 kg (164 lb)   SpO2 92%   BMI 27.29 kg/m    Body mass index is 27.29 kg/m .

## 2021-12-02 NOTE — PLAN OF CARE
Pt is A&Ox4. VSS. On 2 L oxygen nasal cannula. Denies pain. OAKLEY. Expiratory wheezes in all lobes anterior and posterior. Encouraging IS. Able to achieve 1500 for 5 repetitions. Stating she is still unable to cough up phlegm. Scheduled mucinex given. Up independently in the room. Voiding spontaneously. No BM, BS active. Daughter at the bedside thie evening. Will continue to monitor.

## 2021-12-02 NOTE — PLAN OF CARE
Pt is up independent in her room.  Pt is OAKLEY . Pt states that she feels like she is not getting better.  Pt states she wishes she could cough something up. Pt states is the past when she was given the steroid things  moved out of her chest.  Pt will have a CT of her chest . Pt is voiding and tolerating diet.

## 2021-12-02 NOTE — PROGRESS NOTES
[facility]  Hospitalist Progress Note  Pranay Alonso MD 12/02/2021    Reason for Stay (Diagnosis): copd         Assessment and Plan:      Summary of Stay: Pooja Trinidad is a 66 year old vaccinated female with past medical history significant for COPD (chronic 2 L O2 at night), depression, hypertension, paroxysmal atrial fibrillation, intracerebral hemorrhage, anxiety and hypertension, who was just admitted to [facility] (11/25-11/27) for COPD exacerbation (Covid negative).   She was sent home with 7 days of doxycycline and 4 more days of prednisone, and instruction for 1 L of O2 during the day and resuming 2 L of O2 at night.  She returned to the ER 11/29.     Upon initial arrival to the emergency room she was quite tachypneic and was saturating in the mid 80s.  Chest x-ray showed no significant interval change except for atelectasis at lung bases, and no pneumonia.  ABG performed showed very mild PCO2 elevation at 56 and excess bicarb 31 but normal pH at 7.35.  WBCs mildly elevated 13.5 but likely related from steroid use otherwise the rest of her labs were unremarkable.  She was given IV steroids along with 3 nebulizer and had improved symptoms.  She is now admitted to the hospital for acute COPD exacerbation.         #Acute hypoxic respiratory failure due to COPD exacerbation   -Just discharged 2 days prior to this admission for COPD exacerbation  -No new findings concerning for infectious etiology, chest x-ray negative for pneumonia.    -will continue IV Solu-Medrol 40 mg IV twice daily  -Duo nebs every 4 hours for now and albuterol every 2 as needed  -Okay to resume home trelegy inhaler  -Continue doxycycline, complete previously prescribed course.  -Started Mucinex twice daily given complaints of phlegm  -Aggressive incentive spirometry, flutter valve.  -likely will need to be on home O2, was on 1LPM during the day and 2LPM at night after her last discharge.     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 No precautions.    dispo: home in 1-2 days.           Interval History (Subjective):      Still on 2 LPM, very short of breath when up  Checking d-dimer, likely CT chest later today, with or without contrast pending dimer                    Physical Exam:      Last Vital Signs:  BP (!) 140/80 (BP Location: Left arm)   Pulse 72   Temp 97.7  F (36.5  C) (Temporal)   Resp 18   Wt 74.4 kg (164 lb)   SpO2 96%   BMI 27.29 kg/m        Intake/Output Summary (Last 24 hours) at 11/30/2021 0935  Last data filed at 11/30/2021 0900  Gross per 24 hour   Intake 480 ml   Output 1300 ml   Net -820 ml       General: Alert, awake, no acute distress.  HEENT: NC/AT, eyes anicteric, external occular movements intact, face symmetric.   Cardiac: RRR, S1, S2.  No murmurs appreciated.  Pulmonary: Normal chest rise, normal work of breathing. Faint end exp wheeze, mildly reduced air movement.  Abdomen: soft, non-tender, non-distended.  Bowel Sounds Present.  No guarding.  Extremities: no deformities.  Warm, well perfused.  Skin: no rashes or lesions noted.  Warm and Dry.  Neuro: No focal deficits noted. "  Speech clear.  Coordination and strength grossly normal.  Psych: Appropriate affect.         Medications:      All current medications were reviewed with changes reflected in problem list.         Data:      All new lab and imaging data was reviewed.   Labs:  Recent Labs   Lab 11/30/21  0732      POTASSIUM 4.2   CHLORIDE 105   CO2 33*   ANIONGAP 2*   GLC 99   BUN 17   CR 0.55   GFRESTIMATED >90   GENIA 9.1     Recent Labs   Lab 11/30/21  0732   WBC 9.3   HGB 14.3   HCT 43.8   *         Imaging:   Recent Results (from the past 48 hour(s))   XR Chest Port 1 View    Narrative    CHEST ONE VIEW PORTABLE November 29, 2021 8:06 AM     HISTORY: Shortness of breath.    COMPARISON: 11/25/2021.      Impression    IMPRESSION: No significant interval change. Mild scarring and/or  atelectasis at both lung bases. No pneumothorax. Previously noted  possible nipple shadow overlying the left lung base is unchanged. A  short-term follow-up chest x-ray with nipple markers or chest CT may  be helpful to exclude a pulmonary nodule. Heart size is stable.  Pulmonary vascularity is within normal limits.    NENITA SLAUGHTER MD         SYSTEM ID:  [id number]         Pranay Alonso MD , MD.

## 2021-12-02 NOTE — PLAN OF CARE
Pt up independently. Denies pain. LS posteriorly - coarse/dim on L, and R was diminished with both inspiratory and expiratory wheezes. Good nonproductive cough. O2 at 2LPM with humidification. Dyspneic on exertion. On Solu-Medrol and Doxycycline. Plan is to go home once meets criteria.

## 2021-12-03 PROCEDURE — 258N000003 HC RX IP 258 OP 636: Performed by: INTERNAL MEDICINE

## 2021-12-03 PROCEDURE — 250N000011 HC RX IP 250 OP 636: Performed by: INTERNAL MEDICINE

## 2021-12-03 PROCEDURE — 250N000013 HC RX MED GY IP 250 OP 250 PS 637: Performed by: PHYSICIAN ASSISTANT

## 2021-12-03 PROCEDURE — 120N000001 HC R&B MED SURG/OB

## 2021-12-03 PROCEDURE — 99233 SBSQ HOSP IP/OBS HIGH 50: CPT | Performed by: INTERNAL MEDICINE

## 2021-12-03 PROCEDURE — 250N000011 HC RX IP 250 OP 636: Performed by: PHYSICIAN ASSISTANT

## 2021-12-03 PROCEDURE — 94640 AIRWAY INHALATION TREATMENT: CPT

## 2021-12-03 PROCEDURE — 250N000013 HC RX MED GY IP 250 OP 250 PS 637: Performed by: INTERNAL MEDICINE

## 2021-12-03 PROCEDURE — 250N000009 HC RX 250: Performed by: INTERNAL MEDICINE

## 2021-12-03 PROCEDURE — 94640 AIRWAY INHALATION TREATMENT: CPT | Mod: 76

## 2021-12-03 PROCEDURE — 999N000157 HC STATISTIC RCP TIME EA 10 MIN

## 2021-12-03 RX ORDER — AZITHROMYCIN 500 MG/5ML
500 INJECTION, POWDER, LYOPHILIZED, FOR SOLUTION INTRAVENOUS ONCE
Status: DISCONTINUED | OUTPATIENT
Start: 2021-12-03 | End: 2021-12-03

## 2021-12-03 RX ORDER — DIPHENHYDRAMINE HCL 25 MG
25 CAPSULE ORAL EVERY 6 HOURS PRN
Status: DISCONTINUED | OUTPATIENT
Start: 2021-12-03 | End: 2021-12-05 | Stop reason: HOSPADM

## 2021-12-03 RX ORDER — DIPHENHYDRAMINE HCL 25 MG
25 CAPSULE ORAL ONCE
Status: COMPLETED | OUTPATIENT
Start: 2021-12-03 | End: 2021-12-03

## 2021-12-03 RX ORDER — AZITHROMYCIN 250 MG/1
250 TABLET, FILM COATED ORAL DAILY
Status: DISCONTINUED | OUTPATIENT
Start: 2021-12-04 | End: 2021-12-03

## 2021-12-03 RX ORDER — CEFTRIAXONE 1 G/1
1 INJECTION, POWDER, FOR SOLUTION INTRAMUSCULAR; INTRAVENOUS EVERY 24 HOURS
Status: DISCONTINUED | OUTPATIENT
Start: 2021-12-03 | End: 2021-12-05

## 2021-12-03 RX ORDER — DIPHENHYDRAMINE HYDROCHLORIDE 50 MG/ML
25 INJECTION INTRAMUSCULAR; INTRAVENOUS EVERY 6 HOURS PRN
Status: DISCONTINUED | OUTPATIENT
Start: 2021-12-03 | End: 2021-12-05 | Stop reason: HOSPADM

## 2021-12-03 RX ADMIN — METHYLPREDNISOLONE SODIUM SUCCINATE 40 MG: 40 INJECTION, POWDER, FOR SOLUTION INTRAMUSCULAR; INTRAVENOUS at 08:36

## 2021-12-03 RX ADMIN — FLUTICASONE FUROATE AND VILANTEROL TRIFENATATE 1 PUFF: 100; 25 POWDER RESPIRATORY (INHALATION) at 08:09

## 2021-12-03 RX ADMIN — GUAIFENESIN 1200 MG: 600 TABLET, EXTENDED RELEASE ORAL at 08:35

## 2021-12-03 RX ADMIN — IPRATROPIUM BROMIDE AND ALBUTEROL SULFATE 3 ML: .5; 3 SOLUTION RESPIRATORY (INHALATION) at 03:55

## 2021-12-03 RX ADMIN — METOPROLOL SUCCINATE 50 MG: 50 TABLET, EXTENDED RELEASE ORAL at 17:01

## 2021-12-03 RX ADMIN — IPRATROPIUM BROMIDE AND ALBUTEROL SULFATE 3 ML: .5; 3 SOLUTION RESPIRATORY (INHALATION) at 11:30

## 2021-12-03 RX ADMIN — DIPHENHYDRAMINE HYDROCHLORIDE 25 MG: 25 CAPSULE ORAL at 09:41

## 2021-12-03 RX ADMIN — CEFTRIAXONE 1 G: 1 INJECTION, POWDER, FOR SOLUTION INTRAMUSCULAR; INTRAVENOUS at 12:09

## 2021-12-03 RX ADMIN — METOPROLOL SUCCINATE 50 MG: 50 TABLET, EXTENDED RELEASE ORAL at 08:35

## 2021-12-03 RX ADMIN — IPRATROPIUM BROMIDE AND ALBUTEROL SULFATE 3 ML: .5; 3 SOLUTION RESPIRATORY (INHALATION) at 20:04

## 2021-12-03 RX ADMIN — AZITHROMYCIN MONOHYDRATE 500 MG: 500 INJECTION, POWDER, LYOPHILIZED, FOR SOLUTION INTRAVENOUS at 08:53

## 2021-12-03 RX ADMIN — METHYLPREDNISOLONE SODIUM SUCCINATE 40 MG: 40 INJECTION, POWDER, FOR SOLUTION INTRAMUSCULAR; INTRAVENOUS at 20:49

## 2021-12-03 RX ADMIN — IPRATROPIUM BROMIDE AND ALBUTEROL SULFATE 3 ML: .5; 3 SOLUTION RESPIRATORY (INHALATION) at 08:09

## 2021-12-03 RX ADMIN — GUAIFENESIN 1200 MG: 600 TABLET, EXTENDED RELEASE ORAL at 20:49

## 2021-12-03 RX ADMIN — IPRATROPIUM BROMIDE AND ALBUTEROL SULFATE 3 ML: .5; 3 SOLUTION RESPIRATORY (INHALATION) at 00:07

## 2021-12-03 RX ADMIN — ASPIRIN 81 MG: 81 TABLET, COATED ORAL at 08:35

## 2021-12-03 RX ADMIN — SERTRALINE HYDROCHLORIDE 25 MG: 25 TABLET ORAL at 08:35

## 2021-12-03 ASSESSMENT — ACTIVITIES OF DAILY LIVING (ADL)
ADLS_ACUITY_SCORE: 8

## 2021-12-03 NOTE — PROGRESS NOTES
[facility]  Hospitalist Progress Note  Pranay Alonso MD 12/03/2021    Reason for Stay (Diagnosis): copd         Assessment and Plan:      Summary of Stay: Pooja Trinidad is a 66 year old vaccinated female with past medical history significant for COPD (chronic 2 L O2 at night), depression, hypertension, paroxysmal atrial fibrillation, intracerebral hemorrhage, anxiety and hypertension, who was just admitted to [facility] (11/25-11/27) for COPD exacerbation (Covid negative).   She was sent home with 7 days of doxycycline and 4 more days of prednisone, and instruction for 1 L of O2 during the day and resuming 2 L of O2 at night.  She returned to the ER 11/29.     Upon initial arrival to the emergency room she was quite tachypneic and was saturating in the mid 80s.  Chest x-ray showed no significant interval change except for atelectasis at lung bases, and no pneumonia.  ABG performed showed very mild PCO2 elevation at 56 and excess bicarb 31 but normal pH at 7.35.  WBCs mildly elevated 13.5 but likely related from steroid use otherwise the rest of her labs were unremarkable.  She was given IV steroids along with 3 nebulizer and had improved symptoms.  She is now admitted to the hospital for acute COPD exacerbation.     Her initial chest x-ray was negative for infection but due to slow improvement and ongoing cough she underwent CT scan of her chest on 12/2 which showed basilar predominant nodular opacities which are likely infectious.  Expanding antibiotic coverage from sole doxycycline to ceftriaxone and azithromycin.  However, shortly after starting IV azithromycin she felt a warm sensation in her stomach and some lip tingling.  Azithromycin was immediately stopped and she was given a dose of IV Benadryl and her symptoms seem to defervesce at that point.        #Acute hypoxic respiratory failure due to COPD exacerbation   -Just discharged 2 days prior to this admission for COPD  "exacerbation  -will continue IV Solu-Medrol 40 mg IV twice daily  -Duo nebs every 4 hours for now and albuterol every 2 as needed  -Okay to resume home trelegy inhaler  -Completed 7 days doxycycline, start ceftriaxone on 12/3.   -Started Mucinex twice daily given complaints of phlegm  -Aggressive incentive spirometry, flutter valve.  -likely will need to be on home O2, was on 1LPM during the day and 2LPM at night after her last discharge.     Suspected allergic reaction to azithromycin on 12/3: See above.  Symptoms resolved with IV Benadryl.  Added azithromycin to her allergy list.    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 No precautions.    dispo: home in 1-2 days.           Interval History (Subjective):      Still on 2 LPM, very short of breath when up  CT chest without contrast shows basilar predominant nodular opacities, likely infectious.  Expanding antibiotics today to include ceftriaxone   Appears to have had a reaction to azithromycin as noted above, given IV Benadryl and symptoms resolved                    Physical Exam:      Last Vital Signs:  BP (!) 164/93 (BP Location: Left arm)   Pulse 76   " Temp 97.2  F (36.2  C) (Temporal)   Resp 18   Wt 74.4 kg (164 lb)   SpO2 96%   BMI 27.29 kg/m        General: Alert, awake, no acute distress.  HEENT: NC/AT, eyes anicteric, external occular movements intact, face symmetric.   Cardiac: RRR, S1, S2.  No murmurs appreciated.  Pulmonary: Normal chest rise, normal work of breathing. Faint end exp wheeze, mildly reduced air movement.  Abdomen: soft, non-tender, non-distended.  Bowel Sounds Present.  No guarding.  Extremities: no deformities.  Warm, well perfused.  Skin: no rashes or lesions noted.  Warm and Dry.  Neuro: No focal deficits noted.  Speech clear.  Coordination and strength grossly normal.  Psych: Appropriate affect.         Medications:      All current medications were reviewed with changes reflected in problem list.         Data:      All new lab and imaging data was reviewed.   Labs:  Recent Labs   Lab 12/02/21  1035      POTASSIUM 4.3   CHLORIDE 104   CO2 29   ANIONGAP 5   *   BUN 26   CR 0.49*   GFRESTIMATED >90   GENIA 9.4     Recent Labs   Lab 11/30/21  0732   WBC 9.3   HGB 14.3   HCT 43.8   *         Imaging:   Recent Results (from the past 48 hour(s))   XR Chest Port 1 View    Narrative    CHEST ONE VIEW PORTABLE November 29, 2021 8:06 AM     HISTORY: Shortness of breath.    COMPARISON: 11/25/2021.      Impression    IMPRESSION: No significant interval change. Mild scarring and/or  atelectasis at both lung bases. No pneumothorax. Previously noted  possible nipple shadow overlying the left lung base is unchanged. A  short-term follow-up chest x-ray with nipple markers or chest CT may  be helpful to exclude a pulmonary nodule. Heart size is stable.  Pulmonary vascularity is within normal limits.    NENITA SLAUGHTER MD         SYSTEM ID:  [id number]         Pranay Alonso MD , MD.

## 2021-12-03 NOTE — PLAN OF CARE
Pt a/o x4. VSS. 2 L oxygen nasal canula. Denies pain. Independent in room. Allergic reaction to azithromycin this morning, medication stopped and discontinued and oral benadryl given. IV rocephin started today, pt tolerated fine. BM x1. Voiding adequately. Continue antibiotics for 1-2 more days.

## 2021-12-03 NOTE — PLAN OF CARE
Pt is A&Ox4. VSS. On 2 L oxygen nasal cannula. Denies pain. OAKLEY. Expiratory wheezes in all lobes anterior and posterior. Encouraging IS. Able to achieve 1500 for 3 repetitions. Stating she is still unable to cough up phlegm. Scheduled mucinex given. Up independently in the room. Ambulated in the salter. SpO2 88% while walking in the salter. Within 2 minutes of rest, SpO2 92%. Voiding spontaneously. No BM, BS active. CT scan complete. Daughter at the bedside thie evening. Will continue to monitor.

## 2021-12-03 NOTE — PLAN OF CARE
Pt up independently. Denies pain. LS posteriorly - coarse/dim with expiratory wheezes.  Good nonproductive cough. O2 at 2LPM with humidification. Dyspneic on exertion. On Solu-Medrol and Doxycycline. Son brought in acapella flutter valve so pt can use it here. 88% after coming back from bathroom on the 2LPM. Pt stated that she did not feel as short of breath that time. Plan is to go home once meets criteria.

## 2021-12-03 NOTE — PLAN OF CARE
"Azithromycin was starting this morning for pt but stopped because pt complained of arm and lips feeling tingly. Pt also stated that her stomach started feeling \"hot\" and she was getting nauseous. Dr. Alonso was at bedside when symptoms first started and he stopped the medication. Benadryl was ordered and given to pt.   "

## 2021-12-04 PROCEDURE — 94640 AIRWAY INHALATION TREATMENT: CPT

## 2021-12-04 PROCEDURE — 250N000009 HC RX 250: Performed by: INTERNAL MEDICINE

## 2021-12-04 PROCEDURE — 120N000001 HC R&B MED SURG/OB

## 2021-12-04 PROCEDURE — 99232 SBSQ HOSP IP/OBS MODERATE 35: CPT | Performed by: INTERNAL MEDICINE

## 2021-12-04 PROCEDURE — 94640 AIRWAY INHALATION TREATMENT: CPT | Mod: 76

## 2021-12-04 PROCEDURE — 999N000157 HC STATISTIC RCP TIME EA 10 MIN

## 2021-12-04 PROCEDURE — 250N000011 HC RX IP 250 OP 636: Performed by: INTERNAL MEDICINE

## 2021-12-04 PROCEDURE — 250N000011 HC RX IP 250 OP 636: Performed by: PHYSICIAN ASSISTANT

## 2021-12-04 PROCEDURE — 250N000013 HC RX MED GY IP 250 OP 250 PS 637: Performed by: PHYSICIAN ASSISTANT

## 2021-12-04 RX ADMIN — CEFTRIAXONE 1 G: 1 INJECTION, POWDER, FOR SOLUTION INTRAMUSCULAR; INTRAVENOUS at 12:12

## 2021-12-04 RX ADMIN — SERTRALINE HYDROCHLORIDE 25 MG: 25 TABLET ORAL at 08:03

## 2021-12-04 RX ADMIN — IPRATROPIUM BROMIDE AND ALBUTEROL SULFATE 3 ML: .5; 3 SOLUTION RESPIRATORY (INHALATION) at 19:21

## 2021-12-04 RX ADMIN — METOPROLOL SUCCINATE 50 MG: 50 TABLET, EXTENDED RELEASE ORAL at 08:03

## 2021-12-04 RX ADMIN — METHYLPREDNISOLONE SODIUM SUCCINATE 40 MG: 40 INJECTION, POWDER, FOR SOLUTION INTRAMUSCULAR; INTRAVENOUS at 08:03

## 2021-12-04 RX ADMIN — IPRATROPIUM BROMIDE AND ALBUTEROL SULFATE 3 ML: .5; 3 SOLUTION RESPIRATORY (INHALATION) at 12:11

## 2021-12-04 RX ADMIN — FLUTICASONE FUROATE AND VILANTEROL TRIFENATATE 1 PUFF: 100; 25 POWDER RESPIRATORY (INHALATION) at 07:28

## 2021-12-04 RX ADMIN — METOPROLOL SUCCINATE 50 MG: 50 TABLET, EXTENDED RELEASE ORAL at 16:10

## 2021-12-04 RX ADMIN — ASPIRIN 81 MG: 81 TABLET, COATED ORAL at 08:03

## 2021-12-04 RX ADMIN — GUAIFENESIN 1200 MG: 600 TABLET, EXTENDED RELEASE ORAL at 20:47

## 2021-12-04 RX ADMIN — GUAIFENESIN 1200 MG: 600 TABLET, EXTENDED RELEASE ORAL at 08:02

## 2021-12-04 RX ADMIN — IPRATROPIUM BROMIDE AND ALBUTEROL SULFATE 3 ML: .5; 3 SOLUTION RESPIRATORY (INHALATION) at 07:28

## 2021-12-04 RX ADMIN — METHYLPREDNISOLONE SODIUM SUCCINATE 40 MG: 40 INJECTION, POWDER, FOR SOLUTION INTRAMUSCULAR; INTRAVENOUS at 20:47

## 2021-12-04 ASSESSMENT — ACTIVITIES OF DAILY LIVING (ADL)
ADLS_ACUITY_SCORE: 8

## 2021-12-04 NOTE — PLAN OF CARE
A/O x4. VSS. 2L nasal cannula. OAKLEY but feels okay at rest. Wheezing noted to lungs, continues on scheduled nebs and solumedrol. Continues on IV rocephin. Pt denies any ongoing symptoms from zithromax given today which has since been d/c'd .Independent. Regular diet. Denies pain. Plans to discharge home in 1-2 days.

## 2021-12-04 NOTE — PLAN OF CARE
Pt a/o x4. VSS. 2L oxygen nasal canula. Denies pain. LS wheezes and course. BM x1. Walked in hallway. Treating with antibiotics. Possible discharge home tomorrow.

## 2021-12-04 NOTE — PLAN OF CARE
Slept well. Denies cough. States does get SOB with activity. O2 @ 2L/NC. Sat =97%. Lungs with faint expiratory wheeze left anterior, Bibasilar crackles.

## 2021-12-04 NOTE — PROGRESS NOTES
[facility]  Hospitalist Progress Note  Pranay Alonso MD 12/04/2021    Reason for Stay (Diagnosis): copd         Assessment and Plan:      Summary of Stay: Pooja Trinidad is a 66 year old vaccinated female with past medical history significant for COPD (chronic 2 L O2 at night), depression, hypertension, paroxysmal atrial fibrillation, intracerebral hemorrhage, anxiety and hypertension, who was just admitted to [facility] (11/25-11/27) for COPD exacerbation (Covid negative).   She was sent home with 7 days of doxycycline and 4 more days of prednisone, and instruction for 1 L of O2 during the day and resuming 2 L of O2 at night.  She returned to the ER 11/29.     Upon initial arrival to the emergency room she was quite tachypneic and was saturating in the mid 80s.  Chest x-ray showed no significant interval change except for atelectasis at lung bases, and no pneumonia.  ABG performed showed very mild PCO2 elevation at 56 and excess bicarb 31 but normal pH at 7.35.  WBCs mildly elevated 13.5 but likely related from steroid use otherwise the rest of her labs were unremarkable.  She was given IV steroids along with 3 nebulizer and had improved symptoms.  She is now admitted to the hospital for acute COPD exacerbation.     Her initial chest x-ray was negative for infection but due to slow improvement and ongoing cough she underwent CT scan of her chest on 12/2 which showed basilar predominant nodular opacities which are likely infectious.  Expanded antibiotic coverage from sole doxycycline to ceftriaxone and azithromycin 12/3.  However, shortly after starting IV azithromycin she felt a warm sensation in her stomach and some lip tingling.  Azithromycin was immediately stopped and she was given a dose of IV Benadryl and her symptoms seem to defervesce at that point.    Hopefully home Sunday.        #Acute hypoxic respiratory failure due to COPD exacerbation, pneumonia with unspecified  "organism.  -Just discharged 2 days prior to this admission for COPD exacerbation  -will continue IV Solu-Medrol 40 mg IV twice daily  -Duo nebs every 4 hours for now and albuterol every 2 as needed  -Okay to resume home trelegy inhaler  -Completed 7 days doxycycline, started ceftriaxone on 12/3.   -Started Mucinex twice daily given complaints of phlegm  -Aggressive incentive spirometry, flutter valve.  -likely will need to be on home O2, was on 1LPM during the day and 2LPM at night after her last discharge.     Suspected allergic reaction to azithromycin on 12/3: See above.  Symptoms resolved with IV Benadryl.  Added azithromycin to her allergy list.    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 No precautions.    dispo: home in 1-2 days.           Interval History (Subjective):      Still on 2 LPM, less short of breath when up  Tolerating ceftriaxone  Reaction from azithro remains resolved                  Physical Exam:      Last Vital Signs:  BP (!) 164/92 (BP Location: Left arm)   Pulse 77   Temp 97.3  F (36.3  C) (Temporal)   Resp 14   Wt 74.4 kg (164 lb)   SpO2 95%   BMI 27.29 kg/m        General: " Alert, awake, no acute distress.  HEENT: NC/AT, eyes anicteric, external occular movements intact, face symmetric.   Cardiac: RRR, S1, S2.  No murmurs appreciated.  Pulmonary: Normal chest rise, normal work of breathing. Faint end exp wheeze, mildly reduced air movement.  Abdomen: soft, non-tender, non-distended.  Bowel Sounds Present.  No guarding.  Extremities: no deformities.  Warm, well perfused.  Skin: no rashes or lesions noted.  Warm and Dry.  Neuro: No focal deficits noted.  Speech clear.  Coordination and strength grossly normal.  Psych: Appropriate affect.         Medications:      All current medications were reviewed with changes reflected in problem list.         Data:      All new lab and imaging data was reviewed.   Labs:  Recent Labs   Lab 12/02/21  1035      POTASSIUM 4.3   CHLORIDE 104   CO2 29   ANIONGAP 5   *   BUN 26   CR 0.49*   GFRESTIMATED >90   GENIA 9.4     Recent Labs   Lab 11/30/21  0732   WBC 9.3   HGB 14.3   HCT 43.8   *         Imaging:   Recent Results (from the past 48 hour(s))   XR Chest Port 1 View    Narrative    CHEST ONE VIEW PORTABLE November 29, 2021 8:06 AM     HISTORY: Shortness of breath.    COMPARISON: 11/25/2021.      Impression    IMPRESSION: No significant interval change. Mild scarring and/or  atelectasis at both lung bases. No pneumothorax. Previously noted  possible nipple shadow overlying the left lung base is unchanged. A  short-term follow-up chest x-ray with nipple markers or chest CT may  be helpful to exclude a pulmonary nodule. Heart size is stable.  Pulmonary vascularity is within normal limits.    NENITA SLAUGHTER MD         SYSTEM ID:  [id number]         Pranay Alonso MD , MD.

## 2021-12-05 ENCOUNTER — HEALTH MAINTENANCE LETTER (OUTPATIENT)
Age: 66
End: 2021-12-05

## 2021-12-05 VITALS
WEIGHT: 164 LBS | BODY MASS INDEX: 27.29 KG/M2 | RESPIRATION RATE: 20 BRPM | HEART RATE: 76 BPM | OXYGEN SATURATION: 95 % | TEMPERATURE: 97 F | DIASTOLIC BLOOD PRESSURE: 94 MMHG | SYSTOLIC BLOOD PRESSURE: 173 MMHG

## 2021-12-05 PROCEDURE — 99239 HOSP IP/OBS DSCHRG MGMT >30: CPT | Performed by: INTERNAL MEDICINE

## 2021-12-05 PROCEDURE — 94640 AIRWAY INHALATION TREATMENT: CPT

## 2021-12-05 PROCEDURE — 250N000011 HC RX IP 250 OP 636: Performed by: INTERNAL MEDICINE

## 2021-12-05 PROCEDURE — 250N000013 HC RX MED GY IP 250 OP 250 PS 637: Performed by: PHYSICIAN ASSISTANT

## 2021-12-05 PROCEDURE — 94640 AIRWAY INHALATION TREATMENT: CPT | Mod: 76

## 2021-12-05 PROCEDURE — 250N000011 HC RX IP 250 OP 636: Performed by: PHYSICIAN ASSISTANT

## 2021-12-05 PROCEDURE — 999N000157 HC STATISTIC RCP TIME EA 10 MIN

## 2021-12-05 PROCEDURE — 250N000009 HC RX 250: Performed by: INTERNAL MEDICINE

## 2021-12-05 RX ORDER — CEFTRIAXONE 1 G/1
1 INJECTION, POWDER, FOR SOLUTION INTRAMUSCULAR; INTRAVENOUS EVERY 24 HOURS
Status: DISCONTINUED | OUTPATIENT
Start: 2021-12-05 | End: 2021-12-05 | Stop reason: HOSPADM

## 2021-12-05 RX ORDER — CEFUROXIME AXETIL 500 MG/1
500 TABLET ORAL 2 TIMES DAILY
Qty: 8 TABLET | Refills: 0 | Status: SHIPPED | OUTPATIENT
Start: 2021-12-06 | End: 2021-12-10

## 2021-12-05 RX ORDER — PREDNISONE 10 MG/1
TABLET ORAL
Qty: 20 TABLET | Refills: 0 | Status: ON HOLD | OUTPATIENT
Start: 2021-12-06 | End: 2023-11-02

## 2021-12-05 RX ADMIN — IPRATROPIUM BROMIDE AND ALBUTEROL SULFATE 3 ML: .5; 3 SOLUTION RESPIRATORY (INHALATION) at 11:38

## 2021-12-05 RX ADMIN — SERTRALINE HYDROCHLORIDE 25 MG: 25 TABLET ORAL at 08:05

## 2021-12-05 RX ADMIN — METHYLPREDNISOLONE SODIUM SUCCINATE 40 MG: 40 INJECTION, POWDER, FOR SOLUTION INTRAMUSCULAR; INTRAVENOUS at 08:07

## 2021-12-05 RX ADMIN — ASPIRIN 81 MG: 81 TABLET, COATED ORAL at 08:06

## 2021-12-05 RX ADMIN — IPRATROPIUM BROMIDE AND ALBUTEROL SULFATE 3 ML: .5; 3 SOLUTION RESPIRATORY (INHALATION) at 08:30

## 2021-12-05 RX ADMIN — CEFTRIAXONE 1 G: 1 INJECTION, POWDER, FOR SOLUTION INTRAMUSCULAR; INTRAVENOUS at 11:18

## 2021-12-05 RX ADMIN — GUAIFENESIN 1200 MG: 600 TABLET, EXTENDED RELEASE ORAL at 08:05

## 2021-12-05 RX ADMIN — FLUTICASONE FUROATE AND VILANTEROL TRIFENATATE 1 PUFF: 100; 25 POWDER RESPIRATORY (INHALATION) at 08:10

## 2021-12-05 RX ADMIN — METOPROLOL SUCCINATE 50 MG: 50 TABLET, EXTENDED RELEASE ORAL at 08:05

## 2021-12-05 ASSESSMENT — ACTIVITIES OF DAILY LIVING (ADL)
ADLS_ACUITY_SCORE: 8

## 2021-12-05 NOTE — PLAN OF CARE
Patient has been assessed for Home Oxygen needs. Oxygen readings:    *Pulse oximetry (SpO2) = 87% on room air at rest while awake.    *SpO2 improved to 94% on 2 liters/minute at rest.    *SpO2 = 85% on room air during activity/with exercise.    *SpO2 improved to 91% on 2 liters/minute during activity/with exercise.

## 2021-12-05 NOTE — PLAN OF CARE
A&O x4. VSS. 2L NC. Ind. Diminished lung sounds with expiratory wheezing. OAKLEY but pt states it is better than yesterday. Continues on IV solumedrol and IV rocephin. Denies pain. Possible discharge home tomorrow.

## 2021-12-05 NOTE — PLAN OF CARE
A&Ox4.   VSS. 02 - 94% on 2L via Nasal canula with humidifier. LS - Coarse crackles bilateral upper lobes, diminished bilateral lower lobes. Expiratory wheezing. No nebs requested this shift.  Pt reports that SOB gets worse over course of day.  Pt denies pain. CMS intact. IV saline locked.

## 2021-12-05 NOTE — PROGRESS NOTES
"Care Management Discharge Note    Discharge Date: 12/05/2021     Discharge Disposition: Home    Discharge Services:  None    Discharge DME: Oxygen    Discharge Transportation: family or friend will provide    Private pay costs discussed: Not applicable    Education Provided on the Discharge Plan:  Yes  Persons Notified of Discharge Plans: RN, Patient, SW & CM  Patient/Family in Agreement with the Plan:  Yes    Handoff Referral Completed: No    Additional Information:  CM aware of discharge orders in place with new home oxygen orders. Face to face progress note placed. Paged bedside RN to enter \"walk test\" as required. She will do it now. Contacted [facility]: [phone]. LM with patient information updating them that she will discharge on a higher liter flow of 2LPM. Faxed discharge orders to [phone].    RN CC will continue to follow for discharge planning.    Lizzeth Bell RN, BSN, CPHN, CM  Inpatient Care Coordination - [facility]  [phone]                "

## 2021-12-05 NOTE — PLAN OF CARE
Pt a/o x4. VSS. Discharged home on 2 L oxygen nasal canula. Denied pain. Went over discharge instruction with patient. Pt verbalized understanding. Medications sent home with pt. All belongings sent home with pt. Pt discharged home with daughter.

## 2021-12-05 NOTE — PROGRESS NOTES
Oxygen Documentation:   I certify that this patient, Pooja Trinidad has been under my care (or a nurse practitioner or physican's assistant working with me). This is the face-to-face encounter for oxygen medical necessity.      Pooja Trinidad is now in a chronic stable state and continues to require supplemental oxygen. Patient has continued oxygen desaturation due to COPD J44.9.    Alternative treatment(s) tried or considered and deemed clinically infective for treatment of COPD J44.9 include nebulizers, inhalers, steroids, and pulmonary toileting.  If portability is ordered, is the patient mobile within the home? yes    **Patients who qualify for home O2 coverage under the CMS guidelines require ABG tests or O2 sat readings obtained closest to, but no earlier than 2 days prior to the discharge, as evidence of the need for home oxygen therapy. Testing must be performed while patient is in the chronic stable state. See notes for O2 sats.**

## 2021-12-05 NOTE — DISCHARGE SUMMARY
[facility]  Discharge Summary  Name: Pooja Trinidad    MRN: [id number]  YOB: 1955    Age: 66 year old  Date of Discharge:  12/5/2021  Date of Admission: 11/29/2021  Primary Care Provider: Nino Mac  Discharge Physician:  Stephen Alonso MD  Discharging Service:  Hospitalist      Hospital Course/Discharge Diagnoses:   Pooja Trinidad is a 66 year old vaccinated female with past medical history significant for COPD (chronic 2 L O2 at night), depression, hypertension, paroxysmal atrial fibrillation, intracerebral hemorrhage, anxiety and hypertension, who was just admitted to [facility] (11/25-11/27) for COPD exacerbation (Covid negative).   She was sent home with 7 days of doxycycline and 4 more days of prednisone, and instruction for 1 L of O2 during the day and resuming 2 L of O2 at night.  She returned to the ER 11/29.     Upon initial arrival to the emergency room she was quite tachypneic and was saturating in the mid 80s.  Chest x-ray showed no significant interval change except for atelectasis at lung bases, and no pneumonia.  ABG performed showed very mild PCO2 elevation at 56 and excess bicarb 31 but normal pH at 7.35.  WBCs mildly elevated 13.5 but likely related from steroid use otherwise the rest of her labs were unremarkable.  She was given IV steroids along with 3 nebulizer and had improved symptoms.  She is now admitted to the hospital for acute COPD exacerbation.      Her initial chest x-ray was negative for infection but due to slow improvement and ongoing cough she underwent CT scan of her chest on 12/2 which showed basilar predominant nodular opacities which are likely infectious.  Expanded antibiotic coverage from sole doxycycline to ceftriaxone and azithromycin 12/3.  However, shortly after starting IV azithromycin she felt a warm sensation in her stomach and some lip tingling.  Azithromycin was immediately stopped and she was given a dose of IV Benadryl  "and her symptoms seem to defervesce at that point.     At this point she feels significantly better during exertion and she has been transitioned to oral prednisone.  She will discharge home on 2 L/min supplemental oxygen and wean as tolerated at home.  She will complete a course of Ceftin at home as well as an 8-day prednisone taper given fairly heavy recent steroid exposure     #Acute hypoxic respiratory failure due to COPD exacerbation, pneumonia with unspecified organism.  -Just discharged 2 days prior to this admission for COPD exacerbation  -Treated with IV Solu-Medrol 40 mg IV twice daily, now transition to prednisone and taper over 8 days.  -Continuing bronchodilators  -Continue home trelegy inhaler  -Completed 7 days doxycycline, started ceftriaxone on 12/3, will complete beta-lactam therapy at home with Ceftin.  -Started Mucinex twice daily given complaints of phlegm while here in the hospital, stop upon discharge.  -Aggressive incentive spirometry, flutter valve.     Possible allergic reaction to azithromycin on 12/3: See above.  Symptoms resolved with IV Benadryl.  Added azithromycin to her allergy list though I discussed with she and her daughter that this is unusual since she has tolerated oral azithromycin multiple times in the past.     #History of paroxysmal atrial fib  #Hypertension  -No reoccurrence recently, EKG shows normal sinus rhythm  -Last ECHO 9/2021 normal EF 60% Gd 1 Diastolic dysfxn  -Resume metoprolol as well as baby aspirin     #Depression/anxiety  -Continue Zoloft     #Tobacco abuse  - she is a smoker of 1.5 packs/day for 45 years  - She states her last cigarette was last Thursday prior to admission and she \"quit for good\"  -Declined nicotine patch     #History of brain aneurysm 2012--remote  -S/p microsurgical resection of a complex inpatient AVM microsurgical clipping of 2 distinct anterior temporal artery aneurysms  -Was on short-term Keppra for a while but no longer  -Also resume " on anticoagulation with no issues      Discharge Disposition:  Discharged to home     Allergies:  Allergies   Allergen Reactions     Azithromycin Other (See Comments)     Tolerated oral azithromycin in the past, but on 12/3/21 developed lip tingling and a warm feeling in her stomach shortly after starting IV azithromycin.  Symptoms resolved with IV Benadryl     Nickel Rash     Contact dermatitis   Contact dermatitis   Contact dermatitis   Contact dermatitis           Discharge Medications:        Review of your medicines      START taking      Dose / Directions   cefuroxime 500 MG tablet  Commonly known as: CEFTIN  Used for: Acute on chronic respiratory failure with hypoxia (H)      Dose: 500 mg  Start taking on: December 6, 2021  Take 1 tablet (500 mg) by mouth 2 times daily for 4 days  Quantity: 8 tablet  Refills: 0        CONTINUE these medicines which may have CHANGED, or have new prescriptions. If we are uncertain of the size of tablets/capsules you have at home, strength may be listed as something that might have changed.      Dose / Directions   predniSONE 10 MG tablet  Commonly known as: DELTASONE  This may have changed:     medication strength    how much to take    how to take this    when to take this    additional instructions  Used for: Acute on chronic respiratory failure with hypoxia (H)      Start taking on: December 6, 2021  4 tabs daily for 2 days, then 3 tabs daily for 2 days, then 2 tabs daily for 2 days, then 1 tab daily for 2 days, then stop  Quantity: 20 tablet  Refills: 0        CONTINUE these medicines which have NOT CHANGED      Dose / Directions   albuterol 108 (90 Base) MCG/ACT inhaler  Commonly known as: PROAIR HFA/PROVENTIL HFA/VENTOLIN HFA      Dose: 2 puff  Inhale 2 puffs into the lungs Take every 4-6 hours as needed  Refills: 0     aspirin 81 MG EC tablet  Commonly known as: ASA      Dose: 81 mg  Take 81 mg by mouth daily  Refills: 0     Aspirin-Caffeine 500-32.5 MG Tabs      Dose: 2  tablet  Take 2 tablets by mouth 2 times daily as needed  Refills: 0     calcium carbonate 600 mg-vitamin D 400 units 600-400 MG-UNIT per tablet  Commonly known as: CALTRATE      Dose: 1 tablet  Take 1 tablet by mouth daily (with breakfast)  Refills: 0     * ipratropium - albuterol 0.5 mg/2.5 mg/3 mL 0.5-2.5 (3) MG/3ML neb solution  Commonly known as: DUONEB      Dose: 3 mL  Inhale 3 mLs into the lungs every 6 hours as needed  Refills: 0     * Combivent Respimat  MCG/ACT inhaler  Generic drug: ipratropium-albuterol      Dose: 1 puff  Inhale 1 puff into the lungs 4 times daily as needed  Refills: 0     metoprolol succinate ER 50 MG 24 hr tablet  Commonly known as: TOPROL-XL      Dose: 50 mg  Take 50 mg by mouth 2 times daily  Refills: 0     multivitamin, therapeutic with minerals tablet      Dose: 1 tablet  Take 1 tablet by mouth Every Mon, Wed, Fri Morning  Refills: 0     sertraline 25 MG tablet  Commonly known as: ZOLOFT      Dose: 25 mg  Take 25 mg by mouth daily  Refills: 0     Trelegy Ellipta 100-62.5-25 MCG/INH oral inhaler  Generic drug: Fluticasone-Umeclidin-Vilanterol      Dose: 1 puff  Inhale 1 puff into the lungs daily  Refills: 0         * This list has 2 medication(s) that are the same as other medications prescribed for you. Read the directions carefully, and ask your doctor or other care provider to review them with you.            STOP taking    azithromycin 250 MG tablet  Commonly known as: ZITHROMAX        doxycycline hyclate 100 MG capsule  Commonly known as: VIBRAMYCIN              Where to get your medicines      These medications were sent to [facility] [address] [facility]  [address] [facility] [address]    Phone: [phone]     cefuroxime 500 MG tablet    predniSONE 10 MG tablet         Condition on Discharge:  Discharge condition: Stable       Code status on discharge: Full Code     History of Illness:  See detailed admission note for  "full details.    Physical Exam:  Vital signs:  Temp: 97  F (36.1  C) Temp src: Temporal BP: (!) 173/94 Pulse: 73   Resp: 24 SpO2: 96 % O2 Device: Nasal cannula Oxygen Delivery: 2 LPM   Weight: 74.4 kg (164 lb)  Estimated body mass index is 27.29 kg/m  as calculated from the following:    Height as of 11/25/21: 1.651 m (5' 5\").    Weight as of this encounter: 74.4 kg (164 lb).    Wt Readings from Last 1 Encounters:   11/30/21 74.4 kg (164 lb)     General: Alert, awake, no acute distress.  HEENT: NC/AT, eyes anicteric, external occular movements intact, face symmetric.    Cardiac: RRR, S1, S2.  No murmurs appreciated.  Pulmonary: Normal chest rise, normal work of breathing.  Lungs with somewhat distant lung sounds, faint end expiratory wheezes.  Abdomen: soft, non-tender, non-distended.  Bowel Sounds Present.  No guarding.  Extremities: no deformities.  Warm, well perfused.  Skin: no rashes or lesions noted.  Warm and Dry.  Neuro: No focal deficits noted.  Speech clear.  Coordination and strength grossly normal.  Psych: Appropriate affect.    Procedures other than Imaging:  none     Imaging:  Results for orders placed or performed during the hospital encounter of 11/29/21   XR Chest Port 1 View    Narrative    CHEST ONE VIEW PORTABLE November 29, 2021 8:06 AM     HISTORY: Shortness of breath.    COMPARISON: 11/25/2021.      Impression    IMPRESSION: No significant interval change. Mild scarring and/or  atelectasis at both lung bases. No pneumothorax. Previously noted  possible nipple shadow overlying the left lung base is unchanged. A  short-term follow-up chest x-ray with nipple markers or chest CT may  be helpful to exclude a pulmonary nodule. Heart size is stable.  Pulmonary vascularity is within normal limits.    NENITA SLAUGHTER MD         SYSTEM ID:  [id number]   CT Chest w/o Contrast    Narrative    CT CHEST WITHOUT CONTRAST  12/2/2021 4:20 PM    HISTORY:  Pneumonia, effusion or abscess suspected, x-ray " done.    TECHNIQUE:  Scans obtained from the apices through the diaphragm  without IV contrast. Radiation dose for this scan was reduced using  automated exposure control, adjustment of the mA and/or kV according  to patient size, or iterative reconstruction technique.    COMPARISON:  Chest x-ray on 11/29/2021.    FINDINGS:   Chest/mediastinum: Heterogenous thyroid gland. No cardiomegaly or  significant pericardial effusion. Extensive atherosclerotic vascular  calcification of the coronary arteries. No significant mediastinal,  hilar or axillary lymphadenopathy.    Lung/pleura: No pleural effusion or pneumothorax. Upper lobe  predominant emphysematous changes. Bibasilar bronchiectatic changes  with associated atelectasis/consolidation. Scattered areas of mucoid  impaction. Few patchy nodular pulmonary opacities in the right lower  lobe (series 5 image 155), likely infectious.    Upper abdomen: Limited evaluation of the upper abdomen due to lack of  coverage and contrast.    Bones and soft tissue: Multilevel degenerative changes of the spine  with multiple age indeterminate compression deformities of the  thoracic spine most prominent at T6, T7, with approximately 50%  vertebral height loss and to lesser extent of T8, T10 and T11.      Impression    IMPRESSION:    1. Basilar predominant small patchy nodular pulmonary opacities,  likely infectious.  2. Bibasilar bronchiectatic changes with associated areas of mucoid  impaction.  3. Extensive atherosclerotic vascular calcification of the coronary  arteries.  4. Multiple age indeterminate compression deformities of the thoracic  spine most significant at T6 and T7 with approximately 50% vertebral  height loss.     TE GA MD         SYSTEM ID:  [id number]          Recent Lab Results:  Recent Labs   Lab 11/30/21  0732 11/29/21  0755   WBC 9.3 13.5*   HGB 14.3 14.9   HCT 43.8 46.5   * 104*    399          Lab Results   Component Value Date      12/02/2021     11/30/2021     11/29/2021    Lab Results   Component Value Date    CHLORIDE 104 12/02/2021    CHLORIDE 105 11/30/2021    CHLORIDE 105 11/29/2021    Lab Results   Component Value Date    BUN 26 12/02/2021    BUN 17 11/30/2021    BUN 23 11/29/2021      Lab Results   Component Value Date    POTASSIUM 4.3 12/02/2021    POTASSIUM 4.2 11/30/2021    POTASSIUM 3.9 11/29/2021    Lab Results   Component Value Date    CO2 29 12/02/2021    CO2 33 11/30/2021    CO2 28 11/29/2021    Lab Results   Component Value Date    CR 0.49 12/02/2021    CR 0.55 11/30/2021    CR 0.57 11/29/2021        No results for input(s): AST, ALT, GGT, ALKPHOS, BILITOTAL, BILICONJ, BILIDIRECT, JOSS in the last 168 hours.    Invalid input(s): BILIRUBININDIRECT  No results for input(s): INR in the last 168 hours.       Pending Results:    Unresulted Labs Ordered in the Past 30 Days of this Admission     No orders found from 10/30/2021 to 11/30/2021.           Discharge Instructions and Follow-Up:   Discharge Procedure Orders   Reason for your hospital stay   Order Comments: You were admitted for COPD exacerbation due to suspected pneumonia     Follow-up and recommended labs and tests    Order Comments: Follow up with primary care provider, Nino Mac, within 7 days for hospital follow- up.  Please recheck oxygen level at that time     Activity   Order Comments: Your activity upon discharge: activity as tolerated     Order Specific Question Answer Comments   Is discharge order? Yes      Oxygen Adult/Peds   Order Comments: Oxygen Documentation:   I certify that this patient, Pooja Trinidad has been under my care (or a nurse practitioner or physican's assistant working with me). This is the face-to-face encounter for oxygen medical necessity.      Pooja Trinidad is now in a chronic stable state and continues to require supplemental oxygen. Patient has continued oxygen desaturation due to COPD  J44.9.    Alternative treatment(s) tried or considered and deemed clinically infective for treatment of COPD J44.9 include nebulizers, inhalers, steroids, and pulmonary toileting.  If portability is ordered, is the patient mobile within the home? yes    **Patients who qualify for home O2 coverage under the CMS guidelines require ABG tests or O2 sat readings obtained closest to, but no earlier than 2 days prior to the discharge, as evidence of the need for home oxygen therapy. Testing must be performed while patient is in the chronic stable state. See notes for O2 sats.**     Order Specific Question Answer Comments   DME Provider: [facility]    Type: Resume    Did the patient have SpO2 (sat) testing (only needed for new oxgyen or liter flow changes)? Yes    Length of Need: Lifetime    Frequency of Use: Continuous    Mode of Delivery - Continuous Nasal Cannula    Liter Flow - Continuous (LPM): 2    Need for Portability: Yes    Evaluate for Conserving Device: Yes    Maintain Sats >= 90%    The face to face evaluation was performed on: 12/5/2021      Diet   Order Comments: Follow this diet upon discharge: Orders Placed This Encounter      Combination Diet Regular Diet Adult     Order Specific Question Answer Comments   Is discharge order? Yes        Total time spent in face to face contact with the patient and coordinating discharge was:  50 Minutes.

## 2022-01-02 ENCOUNTER — HOSPITAL ENCOUNTER (EMERGENCY)
Facility: CLINIC | Age: 67
Discharge: HOME | End: 2022-01-02
Attending: EMERGENCY MEDICINE | Admitting: EMERGENCY MEDICINE
Payer: COMMERCIAL

## 2022-01-02 ENCOUNTER — APPOINTMENT (OUTPATIENT)
Dept: CT IMAGING | Facility: CLINIC | Age: 67
End: 2022-01-02
Attending: EMERGENCY MEDICINE
Payer: COMMERCIAL

## 2022-01-02 VITALS
SYSTOLIC BLOOD PRESSURE: 132 MMHG | TEMPERATURE: 97.8 F | HEART RATE: 95 BPM | DIASTOLIC BLOOD PRESSURE: 81 MMHG | OXYGEN SATURATION: 96 % | RESPIRATION RATE: 20 BRPM

## 2022-01-02 DIAGNOSIS — I25.10 CORONARY ARTERY DISEASE INVOLVING NATIVE HEART WITHOUT ANGINA PECTORIS, UNSPECIFIED VESSEL OR LESION TYPE: ICD-10-CM

## 2022-01-02 DIAGNOSIS — R07.89 CHEST WALL PAIN: ICD-10-CM

## 2022-01-02 LAB
ALBUMIN SERPL-MCNC: 3.3 G/DL (ref 3.4–5)
ALP SERPL-CCNC: 109 U/L (ref 40–150)
ALT SERPL W P-5'-P-CCNC: 28 U/L (ref 0–50)
ANION GAP SERPL CALCULATED.3IONS-SCNC: 4 MMOL/L (ref 3–14)
AST SERPL W P-5'-P-CCNC: 16 U/L (ref 0–45)
BASOPHILS # BLD AUTO: 0.1 10E3/UL (ref 0–0.2)
BASOPHILS NFR BLD AUTO: 1 %
BILIRUB SERPL-MCNC: 0.2 MG/DL (ref 0.2–1.3)
BUN SERPL-MCNC: 15 MG/DL (ref 7–30)
CALCIUM SERPL-MCNC: 8.8 MG/DL (ref 8.5–10.1)
CHLORIDE BLD-SCNC: 108 MMOL/L (ref 94–109)
CO2 SERPL-SCNC: 30 MMOL/L (ref 20–32)
CREAT SERPL-MCNC: 0.67 MG/DL (ref 0.52–1.04)
EOSINOPHIL # BLD AUTO: 0.2 10E3/UL (ref 0–0.7)
EOSINOPHIL NFR BLD AUTO: 3 %
ERYTHROCYTE [DISTWIDTH] IN BLOOD BY AUTOMATED COUNT: 12.1 % (ref 10–15)
FLUAV RNA SPEC QL NAA+PROBE: NEGATIVE
FLUBV RNA RESP QL NAA+PROBE: NEGATIVE
GFR SERPL CREATININE-BSD FRML MDRD: >90 ML/MIN/1.73M2
GLUCOSE BLD-MCNC: 111 MG/DL (ref 70–99)
HCT VFR BLD AUTO: 43 % (ref 35–47)
HGB BLD-MCNC: 13.3 G/DL (ref 11.7–15.7)
HOLD SPECIMEN: NORMAL
IMM GRANULOCYTES # BLD: 0 10E3/UL
IMM GRANULOCYTES NFR BLD: 0 %
LYMPHOCYTES # BLD AUTO: 2.8 10E3/UL (ref 0.8–5.3)
LYMPHOCYTES NFR BLD AUTO: 48 %
MCH RBC QN AUTO: 32.4 PG (ref 26.5–33)
MCHC RBC AUTO-ENTMCNC: 30.9 G/DL (ref 31.5–36.5)
MCV RBC AUTO: 105 FL (ref 78–100)
MONOCYTES # BLD AUTO: 0.7 10E3/UL (ref 0–1.3)
MONOCYTES NFR BLD AUTO: 12 %
NEUTROPHILS # BLD AUTO: 2.1 10E3/UL (ref 1.6–8.3)
NEUTROPHILS NFR BLD AUTO: 36 %
NRBC # BLD AUTO: 0 10E3/UL
NRBC BLD AUTO-RTO: 0 /100
NT-PROBNP SERPL-MCNC: 119 PG/ML (ref 0–900)
PLATELET # BLD AUTO: 385 10E3/UL (ref 150–450)
POTASSIUM BLD-SCNC: 4.2 MMOL/L (ref 3.4–5.3)
PROT SERPL-MCNC: 6.2 G/DL (ref 6.8–8.8)
RBC # BLD AUTO: 4.11 10E6/UL (ref 3.8–5.2)
SARS-COV-2 RNA RESP QL NAA+PROBE: NEGATIVE
SODIUM SERPL-SCNC: 142 MMOL/L (ref 133–144)
TROPONIN I SERPL HS-MCNC: 7 NG/L
WBC # BLD AUTO: 5.8 10E3/UL (ref 4–11)

## 2022-01-02 PROCEDURE — 84484 ASSAY OF TROPONIN QUANT: CPT | Performed by: EMERGENCY MEDICINE

## 2022-01-02 PROCEDURE — 96374 THER/PROPH/DIAG INJ IV PUSH: CPT | Mod: 59

## 2022-01-02 PROCEDURE — 250N000013 HC RX MED GY IP 250 OP 250 PS 637: Performed by: EMERGENCY MEDICINE

## 2022-01-02 PROCEDURE — 85004 AUTOMATED DIFF WBC COUNT: CPT | Performed by: EMERGENCY MEDICINE

## 2022-01-02 PROCEDURE — 99285 EMERGENCY DEPT VISIT HI MDM: CPT | Mod: 25

## 2022-01-02 PROCEDURE — 36415 COLL VENOUS BLD VENIPUNCTURE: CPT | Performed by: EMERGENCY MEDICINE

## 2022-01-02 PROCEDURE — 80053 COMPREHEN METABOLIC PANEL: CPT | Performed by: EMERGENCY MEDICINE

## 2022-01-02 PROCEDURE — 82040 ASSAY OF SERUM ALBUMIN: CPT | Performed by: EMERGENCY MEDICINE

## 2022-01-02 PROCEDURE — 71275 CT ANGIOGRAPHY CHEST: CPT

## 2022-01-02 PROCEDURE — 93005 ELECTROCARDIOGRAM TRACING: CPT

## 2022-01-02 PROCEDURE — C9803 HOPD COVID-19 SPEC COLLECT: HCPCS

## 2022-01-02 PROCEDURE — 94640 AIRWAY INHALATION TREATMENT: CPT

## 2022-01-02 PROCEDURE — 87636 SARSCOV2 & INF A&B AMP PRB: CPT | Performed by: EMERGENCY MEDICINE

## 2022-01-02 PROCEDURE — 250N000011 HC RX IP 250 OP 636: Performed by: EMERGENCY MEDICINE

## 2022-01-02 PROCEDURE — 83880 ASSAY OF NATRIURETIC PEPTIDE: CPT | Performed by: EMERGENCY MEDICINE

## 2022-01-02 RX ORDER — METHYLPREDNISOLONE SODIUM SUCCINATE 125 MG/2ML
125 INJECTION, POWDER, LYOPHILIZED, FOR SOLUTION INTRAMUSCULAR; INTRAVENOUS ONCE
Status: COMPLETED | OUTPATIENT
Start: 2022-01-02 | End: 2022-01-02

## 2022-01-02 RX ORDER — ALBUTEROL SULFATE 90 UG/1
6 AEROSOL, METERED RESPIRATORY (INHALATION) ONCE
Status: COMPLETED | OUTPATIENT
Start: 2022-01-02 | End: 2022-01-02

## 2022-01-02 RX ORDER — LIDOCAINE 50 MG/G
1 PATCH TOPICAL EVERY 24 HOURS
Qty: 10 PATCH | Refills: 0 | Status: SHIPPED | OUTPATIENT
Start: 2022-01-02 | End: 2022-01-12

## 2022-01-02 RX ORDER — LIDOCAINE 4 G/G
1 PATCH TOPICAL ONCE
Status: DISCONTINUED | OUTPATIENT
Start: 2022-01-02 | End: 2022-01-02 | Stop reason: HOSPADM

## 2022-01-02 RX ORDER — IOPAMIDOL 755 MG/ML
70 INJECTION, SOLUTION INTRAVASCULAR ONCE
Status: COMPLETED | OUTPATIENT
Start: 2022-01-02 | End: 2022-01-02

## 2022-01-02 RX ADMIN — ALBUTEROL SULFATE 6 PUFF: 90 AEROSOL, METERED RESPIRATORY (INHALATION) at 17:44

## 2022-01-02 RX ADMIN — IOPAMIDOL 70 ML: 755 INJECTION, SOLUTION INTRAVENOUS at 18:08

## 2022-01-02 RX ADMIN — METHYLPREDNISOLONE SODIUM SUCCINATE 125 MG: 125 INJECTION, POWDER, FOR SOLUTION INTRAMUSCULAR; INTRAVENOUS at 17:42

## 2022-01-02 RX ADMIN — LIDOCAINE 1 PATCH: 560 PATCH PERCUTANEOUS; TOPICAL; TRANSDERMAL at 19:30

## 2022-01-02 NOTE — ED PROVIDER NOTES
History   Chief Complaint:  Rib Pain and Shortness of Breath       The history is provided by the patient.      Pooja Trinidad is a 66 year old female with history of hypertension, COPD, paroxysmal atrial fibrillation, and brain aneurysm who presents for evaluation of right-sided chest pain.  Her symptoms have been ongoing for about 5 days or so.  She is concerned for a rib fracture she has had rib fractures in the past without trauma.  She has pain that wraps around on the right lateral wall into the right anterior chest.  It has been constant for 5 days.  It is slightly worse with movement.  She is occasionally felt shortness of breath but she is not sure that secondary to pain.  She had no trauma.  She has no diaphoresis, nausea, or vomiting.  She has no fever, chills, or cough, however, her son-in-law who lives in their house has tested positive for COVID-19.    Review of Systems   Constitutional: Negative for chills and fever.   Respiratory: Positive for wheezing. Negative for cough and shortness of breath.    Cardiovascular: Positive for chest pain.   Gastrointestinal: Negative for abdominal pain, diarrhea and vomiting.   Skin: Negative for rash and wound.   All other systems reviewed and are negative.      Allergies:  Azithromycin  Nickel  Environmental allergies      Medications:  albuterol (PROAIR HFA/PROVENTIL HFA/VENTOLIN HFA) 108 (90 Base) MCG/ACT inhaler  aspirin (ASA) 81 MG EC tablet  albuterol-ipratropium (DUONEB) (2.5-0.5 mg) in 3 mL NEBULIZATION solution    Aspirin-Caffeine 500-32.5 MG TABS  calcium carbonate 600 mg-vitamin D 400 units (CALTRATE) 600-400 MG-UNIT per tablet  Fluticasone-Umeclidin-Vilanterol (TRELEGY ELLIPTA) 100-62.5-25 MCG/INH oral inhaler  ipratropium - albuterol 0.5 mg/2.5 mg/3 mL (DUONEB) 0.5-2.5 (3) MG/3ML neb solution  ipratropium-albuterol (COMBIVENT RESPIMAT)  MCG/ACT inhaler  metoprolol succinate ER (TOPROL-XL) 50 MG 24 hr tablet  multivitamin w/minerals  (MULTIVITAMIN, THERAPEUTIC WITH MINERALS) tablet  predniSONE (DELTASONE) 10 MG tablet  sertraline (ZOLOFT) 25 MG tablet  Multivits,Ca,Minerals-Iron-FA 9 mg iron-400 mcg tablet    cyclobenzaprine (FLEXERIL) 10 mg tablet    benzonatate (TESSALON) 100 mg capsule    azithromycin (ZITHROMAX) 250 mg tablet    Sertraline (ZOLOFT) 25 mg tablet     Past Medical History:     Cerebral arteriovenous malformation (AVM)  COPD (chronic obstructive pulmonary disease)  Hypertension, essential  Acute on chronic respiratory failure with hypoxia   Brain aneurysm   Depression with anxiety    PAF (paroxysmal atrial fibrillation)   Depression, recurrent Compression fracture of T6 vertebra with delayed healing     Past Surgical History:    IR Carotid Angiogram x3  IR Miscellaneous Procedure     Physical Exam     Patient Vitals for the past 24 hrs:   BP Temp Temp src Pulse Resp SpO2   01/02/22 1820 (!) 154/88 -- -- -- -- 94 %   01/02/22 1810 (!) 142/77 -- -- -- -- --   01/02/22 1800 -- -- -- -- -- 96 %   01/02/22 1750 126/83 -- -- 85 -- 96 %   01/02/22 1700 109/73 -- -- (!) 41 -- 94 %   01/02/22 1537 119/83 97.8  F (36.6  C) Temporal 95 20 99 %       Physical Exam  Constitutional: Well appearing.  HEENT: Atraumatic.  Moist mucous membranes.  Neck: Soft.  Supple.  No JVD.  Cardiac: Regular rate and rhythm.  No murmur or rub.  No visible rash or bruising of the chest wall.  Minimal reproducible tenderness to palpation.  Respiratory: Clear to auscultation bilaterally.  No respiratory distress.  No wheezing, rhonchi, or rales.  Abdomen: Soft and nontender.  Nondistended.  Musculoskeletal: No edema.  Normal range of motion.  Neurologic: Alert and oriented x3.  Normal tone and bulk.   Normal gait.  Skin: No rashes.  No edema.  Psych: Normal affect.  Normal behavior.            Emergency Department Course   ECG  ECG obtained at 1632, ECG read at 1634  Normal sinus rhythm    Normal ECG  No significant change as compared to prior, dated  10/29/21.  Rate 73 bpm. HI interval 176 ms. QRS duration 68 ms. QT/QTc 376/414 ms. P-R-T axes 64 15 57.     Imaging:  CT Chest Pulmonary Embolism w Contrast   Final Result   IMPRESSION:   1.  No pulmonary embolus, aortic dissection or aneurysm.   2.  Moderate emphysema. Bilateral discoid atelectasis.   3.  Severe coronary artery disease and atherosclerotic vascular disease.        Report per radiology    Laboratory:  Labs Ordered and Resulted from Time of ED Arrival to Time of ED Departure   COMPREHENSIVE METABOLIC PANEL - Abnormal       Result Value    Sodium 142      Potassium 4.2      Chloride 108      Carbon Dioxide (CO2) 30      Anion Gap 4      Urea Nitrogen 15      Creatinine 0.67      Calcium 8.8      Glucose 111 (*)     Alkaline Phosphatase 109      AST 16      ALT 28      Protein Total 6.2 (*)     Albumin 3.3 (*)     Bilirubin Total 0.2      GFR Estimate >90     CBC WITH PLATELETS AND DIFFERENTIAL - Abnormal    WBC Count 5.8      RBC Count 4.11      Hemoglobin 13.3      Hematocrit 43.0       (*)     MCH 32.4      MCHC 30.9 (*)     RDW 12.1      Platelet Count 385      % Neutrophils 36      % Lymphocytes 48      % Monocytes 12      % Eosinophils 3      % Basophils 1      % Immature Granulocytes 0      NRBCs per 100 WBC 0      Absolute Neutrophils 2.1      Absolute Lymphocytes 2.8      Absolute Monocytes 0.7      Absolute Eosinophils 0.2      Absolute Basophils 0.1      Absolute Immature Granulocytes 0.0      Absolute NRBCs 0.0     TROPONIN I - Normal    Troponin I High Sensitivity 7     INFLUENZA A/B & SARS-COV2 PCR MULTIPLEX - Normal    Influenza A PCR Negative      Influenza B PCR Negative      SARS CoV2 PCR Negative     NT PROBNP INPATIENT - Normal    N terminal Pro BNP Inpatient 119        Emergency Department Course:    Reviewed:  I reviewed nursing notes, vitals, past medical history, Care Everywhere and [facility]    Assessments:  1606 I obtained history and examined the patient as noted above.    1926 I rechecked the patient and explained findings.     Interventions:  1742  methylPREDNISolone sodium succinate (solu-MEDROL) injection 125 mg, IV  1744  albuterol (PROVENTIL HFA/VENTOLIN HFA) inhaler, Inhalation  1930  Lidocaine (LIDOCARE) 4 % Patch 1 patch, Topical     Disposition:  The patient was discharged to home.     Impression & Plan     Medical Decision Making:  Pooja Trinidad is a 66-year-old woman who is afebrile and hemodynamically stable.  Her EKG demonstrates a normal sinus rhythm with no acute ischemic changes on my read.  Lab work-up is noted as above and is grossly unrevealing.  She has had pain for multiple days with a normal EKG and troponin this excludes ACS.  This is very atypical for coronary disease and ACS symptoms.  Given her exposure to Covid, we obtain a CT PE study revealed no evidence of PE and no other acute abnormality to explain her symptoms.  She did have very faint minimal wheezing initially that improved with an inhaler.  She has a nebulizer at home.  I discussed potential prednisone burst, however, she declined.  She has been exposed to Covid with someone in the household living with her and I discussed that she should continue to quarantine and have repeat testing if symptoms continue to worsen.  There is no evidence of herpes zoster at this time to explain her symptoms.  This seems muscular as it hurts with movement and reproducible with palpation.  She feels countable discharging home.  I discussed plan for home with Lidoderm patch and she declined the need for any further pain medication here.  I discussed plan for discharge with close primary care follow-up and she was given strict return precautions.  She is in agreement her questions were answered.  She was in no distress at time of discharge.    Diagnosis:    ICD-10-CM    1. Chest wall pain  R07.89    2. Coronary artery disease involving native heart without angina pectoris, unspecified vessel or lesion type   I25.10        Discharge Medications:  New Prescriptions    LIDOCAINE (LIDODERM) 5 % PATCH    Place 1 patch onto the skin every 24 hours for 10 days       Scribe Disclosure:  I, China Rivas, am serving as a scribe at 4:06 PM on 1/2/2022 to document services personally performed by Ray Li MD based on my observations and the provider's statements to me.     I, Danni Shah, am serving as a scribe at 4:40 PM on 1/2/2022 to document services personally performed by Ray Li MD based on my observations and the provider's statements to me.      Ray Li MD  01/05/22 [id number]

## 2022-01-02 NOTE — ED TRIAGE NOTES
Patient complain of right sided rib pain and SOB started on [place]. ABC intact alert and no distress. Patient states she is worried about rib fracture. Patient states she was also exposed to COVID - family member that lives with her is positive. ABC intact alert and no distress.

## 2022-01-03 LAB
ATRIAL RATE - MUSE: 73 BPM
DIASTOLIC BLOOD PRESSURE - MUSE: NORMAL MMHG
INTERPRETATION ECG - MUSE: NORMAL
P AXIS - MUSE: 64 DEGREES
PR INTERVAL - MUSE: 176 MS
QRS DURATION - MUSE: 68 MS
QT - MUSE: 376 MS
QTC - MUSE: 414 MS
R AXIS - MUSE: 15 DEGREES
SYSTOLIC BLOOD PRESSURE - MUSE: NORMAL MMHG
T AXIS - MUSE: 57 DEGREES
VENTRICULAR RATE- MUSE: 73 BPM

## 2022-01-03 ASSESSMENT — ENCOUNTER SYMPTOMS
WOUND: 0
SHORTNESS OF BREATH: 0
ABDOMINAL PAIN: 0
CHILLS: 0
DIARRHEA: 0
WHEEZING: 1
VOMITING: 0
COUGH: 0
FEVER: 0

## 2022-01-03 NOTE — DISCHARGE INSTRUCTIONS
Discharge Instructions  Chest Pain    You have been seen today for chest pain or discomfort.  At this time, your provider has found no signs that your chest pain is due to a serious or life-threatening condition, (or you have declined more testing and/or admission to the hospital). However, sometimes there is a serious problem that does not show up right away. Your evaluation today may not be complete and you may need further testing and evaluation.     Generally, every Emergency Department visit should have a follow-up clinic visit with either a primary or a specialty clinic/provider. Please follow-up as instructed by your emergency provider today.  Return to the Emergency Department if:  Your chest pain changes, gets worse, starts to happen more often, or comes with less activity.  You are newly short of breath.  You get very weak or tired.  You pass out or faint.  You have any new symptoms, like fever, cough, numb legs, or you cough up blood.  You have anything else that worries you.    Until you follow-up with your regular provider, please do the following:  Take one aspirin daily unless you have an allergy or are told not to by your provider.  If a stress test appointment has been made, go to the appointment.  If you have questions, contact your regular provider.  Follow-up with your regular provider/clinic as directed; this is very important.    If you were given a prescription for medicine here today, be sure to read all of the information (including the package insert) that comes with your prescription.  This will include important information about the medicine, its side effects, and any warnings that you need to know about.  The pharmacist who fills the prescription can provide more information and answer questions you may have about the medicine.  If you have questions or concerns that the pharmacist cannot address, please call or return to the Emergency Department.       Remember that you can always come  back to the Emergency Department if you are not able to see your regular provider in the amount of time listed above, if you get any new symptoms, or if there is anything that worries you.    Discharge Instructions  Chest Injury    You have been seen today because of a chest injury.  You may have contusion (bruise) of the chest or a rib fracture (broken bone).  Rib fractures can be hard to see on x-ray, so we cannot always be sure whether your rib is broken or bruised. Fortunately, the treatment of these injuries is usually the same, and includes pain control and preventing complications.    Generally, every Emergency Department visit should have a follow-up clinic visit with either a primary or a specialty clinic/provider. Please follow-up as instructed by your emergency provider today.    Return to the Emergency Department if:  You become short of breath.  You develop a fever over 101.5 F.  You pass out or become very weak or pale.  You have abdominal (belly) pain that is new or increasing.  You cough up blood.  You have new symptoms or anything that worries you.    Follow-up with your provider:  As directed by your provider today.  If you are not improved in two weeks.  If you need more pain medicine, since we do not refill pain pills through the Emergency Department.    Home care instructions:  Chest injuries can be painful.  You may take an over-the-counter pain medication such as Tylenol  (acetaminophen), Advil  (ibuprofen), Motrin  (ibuprofen) or Aleve  (naproxen).  Applying ice packs to the painful area can help your pain.   Holding a pillow against your chest can help with pain when you need to move or cough.  You may need to rest and avoid lifting particularly in the first few days after your injury.  Prevention of pneumonia (lung infection) is also a part of managing chest injuries.  Because it can hurt to take deep breaths, you could develop collapsed areas of lung that can develop infection.  To prevent  this, you need to take ten very deep breaths every hour while you are awake. Sometimes you will be given a device called an incentive spirometer to help with this. You also need to make yourself cough every hour.  Rib belts or binders are not generally recommended, since they may increase the risk of pneumonia. If you do use one, use it for only short periods of time.   If you were given a prescription for medicine here today, be sure to read all of the information (including the package insert) that comes with your prescription.  This will include important information about the medicine, its side effects, and any warnings that you need to know about.  The pharmacist who fills the prescription can provide more information and answer questions you may have about the medicine.  If you have questions or concerns that the pharmacist cannot address, please call or return to the Emergency Department.   Remember that you can always come back to the Emergency Department if you are not able to see your regular provider in the amount of time listed above, if you get any new symptoms, or if there is anything that worries you.

## 2022-01-29 ENCOUNTER — LAB (OUTPATIENT)
Dept: URGENT CARE | Facility: URGENT CARE | Age: 67
End: 2022-01-29
Attending: FAMILY MEDICINE
Payer: COMMERCIAL

## 2022-01-29 DIAGNOSIS — Z20.822 SUSPECTED 2019 NOVEL CORONAVIRUS INFECTION: ICD-10-CM

## 2022-01-29 PROCEDURE — U0005 INFEC AGEN DETEC AMPLI PROBE: HCPCS

## 2022-01-29 PROCEDURE — U0003 INFECTIOUS AGENT DETECTION BY NUCLEIC ACID (DNA OR RNA); SEVERE ACUTE RESPIRATORY SYNDROME CORONAVIRUS 2 (SARS-COV-2) (CORONAVIRUS DISEASE [COVID-19]), AMPLIFIED PROBE TECHNIQUE, MAKING USE OF HIGH THROUGHPUT TECHNOLOGIES AS DESCRIBED BY CMS-2020-01-R: HCPCS

## 2022-01-30 LAB — SARS-COV-2 RNA RESP QL NAA+PROBE: POSITIVE

## 2022-01-31 ENCOUNTER — TELEPHONE (OUTPATIENT)
Dept: NURSING | Facility: CLINIC | Age: 67
End: 2022-01-31
Payer: COMMERCIAL

## 2022-01-31 NOTE — TELEPHONE ENCOUNTER
"Coronavirus (COVID-19) Notification    Caller Name (Patient, parent, daughter/son, grandparent, etc)  patient    Reason for call  Notify of Positive Coronavirus (COVID-19) lab results, assess symptoms,  review [facility] recommendations    Lab Result    Lab test:  2019-nCoV rRt-PCR or SARS-CoV-2 PCR    Oropharyngeal AND/OR nasopharyngeal swabs is POSITIVE for 2019-nCoV RNA/SARS-COV-2 PCR (COVID-19 virus)    RN Recommendations/Instructions per [facility] Coronavirus COVID-19 recommendations    Brief introduction script  Introduce self then review script:  \"I am calling on behalf of [employer].  We were notified that your Coronavirus test (COVID-19) for was POSITIVE for the virus.  I have some information to relay to you but first I wanted to mention that the [place] Dept of Health will be contacting you shortly [it's possible MD already called Patient] to talk to you more about how you are feeling and other people you have had contact with who might now also have the virus.  Also, [facility] is Partnering with the [facility] for Covid-19 research, you may be contacted directly by research staff.\"      Assessment (Inquire about Patient's current symptoms)   Assessment   Current Symptoms at time of phone call: (if no symptoms, document No symptoms] yes   Symptoms onset (if applicable) Sinus pressure, loss of taste and smell. Fever, cough, sore throat     If at time of call, Patients symptoms hare worsened, the Patient should contact 911 or have someone drive them to Emergency Dept promptly:      If Patient calling 911, inform 911 personal that you have tested positive for the Coronavirus (COVID-19).  Place mask on and await 911 to arrive.    If Emergency Dept, If possible, please have another adult drive you to the Emergency Dept but you need to wear mask when in contact with other people.          Treatment Options:   Patient classified as COVID treatment eligible by Epic high risk " algorithm: Yes  Is the patient symptomatic at the time of result notification? Yes. Was the onset of symptoms within the last 5 days? Yes.   Inform patient they may be eligible for a therapeutic treatment option that may reduce complications and hospitalization risk related to COVID19. These options would be discussed during a virtual visit with a provider.   Does the patient agree to have a visit with a provider to discuss treatment options? No.  You may be eligible to receive a new treatment with a monoclonal antibody for preventing hospitalization in patients at high risk for complications from COVID-19.   This medication is still experimental and available on a limited basis; it is given through an IV and must be given at an infusion center. Please note that not all people who are eligible will receive the medication since it is in limited supply. Are you interested in being considered for this medication?   No.     Review information with Patient    Your result was positive. This means you have COVID-19 (coronavirus).  We have sent you a letter that reviews the information that I'll be reviewing with you now.    How can I protect others?    If you have symptoms: stay home and away from others (self-isolate) until:    You've had no fever--and no medicine that reduces fever--for 1 full day (24 hours). And       Your other symptoms have gotten better. For example, your cough or breathing has improved. And     At least 10 days have passed since your symptoms started. (If you've been told by a doctor that you have a weak immune system, wait 20 days.)     If you don't have symptoms: Stay home and away from others (self-isolate) until at least 10 days have passed since your first positive COVID-19 test. (Date test collected)    During this time:    Stay in your own room, including for meals. Use your own bathroom if you can.    Stay away from others in your home. No hugging, kissing or shaking hands. No visitors.      Don't go to work, school or anywhere else.     Clean  high touch  surfaces often (doorknobs, counters, handles, etc.). Use a household cleaning spray or wipes. You'll find a full list on the EPA website at www.epa.gov/pesticide-registration/list-n-disinfectants-use-against-sars-cov-2.     Cover your mouth and nose with a mask, tissue or other face covering to avoid spreading germs.    Wash your hands and face often with soap and water.    Make a list of people you have been in close contact with recently, even if either of you wore a face covering.   - Start your list from 2 days before you became ill or had a positive test.  - Include anyone that was within 6 feet of you for a cumulative total of 15 minutes or more in 24 hours. (Example: if you sat next to Yang for 5 minutes in the morning and 10 minutes in the afternoon, then you were in close contact for 15 minutes total that day. Yang would be added to your list.)    A public health worker will call or text you. It is important that you answer. They will ask you questions about possible exposures to COVID-19, such as people you have been in direct contact with and places you have visited.    Tell the people on your list that you have COVID-19; they should stay away from others for 14 days starting from the last time they were in contact with you (unless you are told something different from a public health worker).     Caregivers in these groups are at risk for severe illness due to COVID-19:  o People 65 years and older  o People who live in a nursing home or long-term care facility  o People with chronic disease (lung, heart, cancer, diabetes, kidney, liver, immunologic)  o People who have a weakened immune system, including those who:  - Are in cancer treatment  - Take medicine that weakens the immune system, such as corticosteroids  - Had a bone marrow or organ transplant  - Have an immune deficiency  - Have poorly controlled HIV or AIDS  - Are obese  (body mass index of 40 or higher)  - Smoke regularly    Caregivers should wear gloves while washing dishes, handling laundry and cleaning bedrooms and bathrooms.    Wash and dry laundry with special caution. Don't shake dirty laundry, and use the warmest water setting you can.    If you have a weakened immune system, ask your doctor about other actions you should take.    For more tips, go to www.cdc.gov/coronavirus/2019-ncov/downloads/10Things.pdf.    You should not go back to work until you meet the guidelines above for ending your home isolation. You don't need to be retested for COVID-19 before going back to work--studies show that you won't spread the virus if it's been at least 10 days since your symptoms started (or 20 days, if you have a weak immune system).    Employers: This document serves as formal notice of your employee's medical guidelines for going back to work. They must meet the above guidelines before going back to work in person.    How can I take care of myself?    1. Get lots of rest. Drink extra fluids (unless a doctor has told you not to).    2. Take Tylenol (acetaminophen) for fever or pain. If you have liver or kidney problems, ask your family doctor if it's okay to take Tylenol.     Take either:     650 mg (two 325 mg pills) every 4 to 6 hours, or     1,000 mg (two 500 mg pills) every 8 hours as needed.     Note: Don't take more than 3,000 mg in one day. Acetaminophen is found in many medicines (both prescribed and over-the-counter medicines). Read all labels to be sure you don't take too much.    For children, check the Tylenol bottle for the right dose (based on their age or weight).    3. If you have other health problems (like cancer, heart failure, an organ transplant or severe kidney disease): Call your specialty clinic if you don't feel better in the next 2 days.    4. Know when to call 911: Emergency warning signs include:    Trouble breathing or shortness of breath    Pain or  pressure in the chest that doesn't go away    Feeling confused like you haven't felt before, or not being able to wake up    Bluish-colored lips or face    5. Sign up for [employer]. We know it's scary to hear that you have COVID-19. We want to track your symptoms to make sure you're okay over the next 2 weeks. Please look for an email from [employer]--this is a free, online program that we'll use to keep in touch. To sign up, follow the link in the email. Learn more at [email or web address].    Where can I get more information?    [facility]: [email or web address]    Coronavirus Basics: www.health.[facility].[place]/diseases/coronavirus/basics.html    What to Do If You're Sick: www.cdc.gov/coronavirus/2019-ncov/about/steps-when-sick.html    Ending Home Isolation: www.cdc.gov/coronavirus/2019-ncov/hcp/disposition-in-home-patients.html     Caring for Someone with COVID-19: www.cdc.gov/coronavirus/2019-ncov/if-you-are-sick/care-for-someone.html     [facility] clinical trials (COVID-19 research studies): [email or web address].[email or web address]-[email or web address]     A Positive COVID-19 letter will be sent via [employer] or the mail. (Exception, no letters sent to Presurgerical/Preprocedure Patients)    Debbie Abdullahi

## 2022-09-25 ENCOUNTER — HEALTH MAINTENANCE LETTER (OUTPATIENT)
Age: 67
End: 2022-09-25

## 2023-02-04 ENCOUNTER — HEALTH MAINTENANCE LETTER (OUTPATIENT)
Age: 68
End: 2023-02-04

## 2023-08-31 ENCOUNTER — APPOINTMENT (OUTPATIENT)
Dept: GENERAL RADIOLOGY | Facility: CLINIC | Age: 68
End: 2023-08-31
Attending: EMERGENCY MEDICINE
Payer: COMMERCIAL

## 2023-08-31 ENCOUNTER — HOSPITAL ENCOUNTER (EMERGENCY)
Facility: CLINIC | Age: 68
Discharge: HOME | End: 2023-08-31
Attending: EMERGENCY MEDICINE | Admitting: EMERGENCY MEDICINE
Payer: COMMERCIAL

## 2023-08-31 VITALS
RESPIRATION RATE: 20 BRPM | HEART RATE: 89 BPM | DIASTOLIC BLOOD PRESSURE: 96 MMHG | OXYGEN SATURATION: 97 % | TEMPERATURE: 97.8 F | SYSTOLIC BLOOD PRESSURE: 143 MMHG

## 2023-08-31 DIAGNOSIS — S52.502A CLOSED FRACTURE OF DISTAL END OF LEFT RADIUS, UNSPECIFIED FRACTURE MORPHOLOGY, INITIAL ENCOUNTER: ICD-10-CM

## 2023-08-31 DIAGNOSIS — S22.32XA CLOSED FRACTURE OF ONE RIB OF LEFT SIDE, INITIAL ENCOUNTER: ICD-10-CM

## 2023-08-31 PROCEDURE — 99284 EMERGENCY DEPT VISIT MOD MDM: CPT | Mod: 25

## 2023-08-31 PROCEDURE — 25600 CLTX DST RDL FX/EPHYS SEP WO: CPT | Mod: LT

## 2023-08-31 PROCEDURE — 73110 X-RAY EXAM OF WRIST: CPT | Mod: LT

## 2023-08-31 PROCEDURE — 71101 X-RAY EXAM UNILAT RIBS/CHEST: CPT | Mod: LT

## 2023-08-31 RX ORDER — OXYCODONE HYDROCHLORIDE 5 MG/1
5 TABLET ORAL EVERY 6 HOURS PRN
Qty: 12 TABLET | Refills: 0 | Status: ON HOLD | OUTPATIENT
Start: 2023-08-31 | End: 2023-11-02

## 2023-08-31 ASSESSMENT — ACTIVITIES OF DAILY LIVING (ADL): ADLS_ACUITY_SCORE: 35

## 2023-08-31 NOTE — ED TRIAGE NOTES
Pt to ER with c/o fall at home in bedroom, landed on hard wood floor, pt c/o pain to left wrist and left sided ribs

## 2023-08-31 NOTE — DISCHARGE INSTRUCTIONS
Take Tylenol/Acetaminophen 650 mg every 4 hours as needed for pain    Opioid Medication Information    You have been given a prescription for an opioid (narcotic) pain medicine and/or have received a pain medicine while here in the Emergency Department. These medicines can make you drowsy or impaired. You must not drive, operate dangerous equipment, or engage in any other dangerous activities while taking these medications. If you drive while taking these medications, you could be arrested for DUI, or driving under the influence. Do not drink any alcohol while you are taking these medications.   Opioid pain medications can cause addiction. If you have a history of chemical dependency of any type, you are at a higher risk of becoming addicted to pain medications.  Only take these prescribed medications to treat your pain when all other options have been tried. Take it for as short a time and as few doses as possible. Store your pain pills in a secure place, as they are frequently stolen and provide a dangerous opportunity for children or visitors in your house to start abusing these powerful medications. We will not replace any lost or stolen medicine.  As soon as your pain is better, you should flush all your remaining medication.   Many prescription pain medications contain Tylenol  (acetaminophen), including Vicodin , Tylenol #3 , Norco , Lortab , and Percocet .  You should not take any extra pills of Tylenol  if you are using these prescription medications or you can get very sick.  Do not ever take more than 4000 mg of acetaminophen in any 24 hour period.  All opioids tend to cause constipation. Drink plenty of water and eat foods that have a lot of fiber, such as fruits, vegetables, prune juice, apple juice and high fiber cereal.  Take a laxative if you don t move your bowels at least every other day. Miralax , Milk of Magnesia, Colace , or Senna  can be used to keep you regular.

## 2023-08-31 NOTE — ED PROVIDER NOTES
History     Chief Complaint:  No chief complaint on file.       HPI   Pooja Trinidad is a 68 year old female who arrives with chief complaint of left wrist pain and lower/anterior left rib pain after she sustained a mechanical fall when she got up to go the bathroom and lost her balance and fell backward at home in her bedroom landing on a hard wood floor landing on her left side with her left wrist under the left side of her ribs.  She denies any abdominal pain, vomiting, shortness of breath, numbness, weakness, head injury, headache, neck pain, nausea or vomiting.  She is ambulatory.      Independent Historian:   None - Patient Only    Review of External Notes:   I reviewed care everywhere for her past medical history which is stated below.      Medications:    oxyCODONE (ROXICODONE) 5 MG tablet  albuterol (PROAIR HFA/PROVENTIL HFA/VENTOLIN HFA) 108 (90 Base) MCG/ACT inhaler  aspirin (ASA) 81 MG EC tablet  Aspirin-Caffeine 500-32.5 MG TABS  calcium carbonate 600 mg-vitamin D 400 units (CALTRATE) 600-400 MG-UNIT per tablet  Fluticasone-Umeclidin-Vilanterol (TRELEGY ELLIPTA) 100-62.5-25 MCG/INH oral inhaler  ipratropium - albuterol 0.5 mg/2.5 mg/3 mL (DUONEB) 0.5-2.5 (3) MG/3ML neb solution  ipratropium-albuterol (COMBIVENT RESPIMAT)  MCG/ACT inhaler  metoprolol succinate ER (TOPROL-XL) 50 MG 24 hr tablet  multivitamin w/minerals (MULTIVITAMIN, THERAPEUTIC WITH MINERALS) tablet  predniSONE (DELTASONE) 10 MG tablet  sertraline (ZOLOFT) 25 MG tablet        Past Medical History:    Brain Aneurysm coiled  HTN  Atrial Fibrillation  Depression  COPD    Past Surgical History:    Past Surgical History:   Procedure Laterality Date    IR CAROTID ANGIOGRAM  3/5/2012    IR CAROTID ANGIOGRAM  3/5/2012    IR CAROTID ANGIOGRAM  3/6/2012    IR MISCELLANEOUS PROCEDURE  3/5/2012    IR MISCELLANEOUS PROCEDURE  3/5/2012    IR MISCELLANEOUS PROCEDURE  3/6/2012    IR MISCELLANEOUS PROCEDURE  3/6/2012        Physical Exam    Patient Vitals for the past 24 hrs:   BP Temp Temp src Pulse Resp SpO2   08/31/23 0619 (!) 143/96 97.8  F (36.6  C) Temporal 89 20 97 %        Physical Exam  Nursing note and vitals reviewed.  Constitutional:  Appears well-developed and well-nourished.   HENT:   Head:    Atraumatic.   Mouth/Throat:   Oropharynx is clear and moist. No oropharyngeal exudate.   Eyes:    Pupils are equal, round, and reactive to light.   Neck:    Nontender neck.  Normal range of motion. Neck supple.      No tracheal deviation present. No thyromegaly present.   Cardiovascular:  Normal rate, regular rhythm, no murmur   Pulmonary/Chest: There is mild tenderness to left anterior lower ribs approximately 8 and 9.  No crepitus or subcutaneous emphysema.  No visible bruising.  Breath sounds are clear and equal without wheezes or crackles.  Abdominal:   Soft. Bowel sounds are normal. Exhibits no distension and      no mass. There is no tenderness.      There is no rebound and no guarding.   Musculoskeletal:  Left arm: Tenderness with swelling to the distal radius and a small bruise to the distal ulna.  Range of motion of the wrist limited due to pain.  Good radial pulse.  Hand, scaphoid bone, proximal forearm and shoulder are nontender.  Sensation intact distally to the hand and wrist.  Lymphadenopathy:  No cervical adenopathy.   Neurological:   Alert and oriented to person, place, and time. GCS 15.  CN 2-12 intact.   Bilateral lower extremity strength strong and equal, including strong dorsiflexion and plantarflexion strength.  Sensation intact and equal to the face, arms and legs.  No facial droop or weakness. Normal speech.  Follows commands and answers questions normally.    Skin:    Skin is warm and dry. No rash noted. No pallor.       Emergency Department Course       Imaging:  Ribs XR, unilat 3 views + PA chest,  left   Final Result   IMPRESSION: Some stable fibrosis in both lung bases. No acute new findings in the lungs. Slight  irregularity left eighth rib anterior laterally at the area of the BB marker. This is concerning for a possible nondisplaced rib fracture. Old right eighth    rib fracture.      XR Wrist Left G/E 3 Views   Final Result   IMPRESSION: Acute fracture of the distal radial metaphysis with mild impaction at the metaphysis. Adjacent soft tissue swelling. No ulnar fracture is evident. Degenerative changes in the first CMC joint and third MCP joint.         Report per radiology    Laboratory:  Labs Ordered and Resulted from Time of ED Arrival to Time of ED Departure - No data to display     Procedures     Splint Placement     Procedure: Splint Placement     Indication: Fracture    Consent: Verbal     Location: Left Wrist    Procedure detail:   Splint was applied by Myself  Splint type: Thumb spica   Splint materilal: Fiberglass  After placement I checked and adjusted the fit as needed to ensure proper positioning/fit   Sensation and circulation are intact after splint placement     Patient Status: The patient tolerated the procedure well: Yes. There were no complications.      Emergency Department Course & Assessments:      Interventions:  Medications - No data to display       Independent Interpretation (X-rays, CTs, rhythm strip):  I reviewed her left wrist x-rays and she see the distal radius fracture which is slightly impacted but not significantly displaced.    Consultations/Discussion of Management or Tests:  None        Social Determinants of Health affecting care:   None    Disposition:  The patient was discharged to home.     Impression & Plan      Medical Decision Making:  This patient arrived to the emergency department due to injuries from a mechanical fall I found her to have a closed left distal radius fracture without any significant displacement and a closed left eighth rib fracture without any sign of pneumothorax, hemothorax, effusion.  She also does not have any abdominal pain or tenderness so I do not  feel there is any sign of solid abdominal organ injury or intra-abdominal bleeding.  She was placed in a short arm Ortho-Glass splint for her left distal radius fracture referred to follow-up with orthopedic hand surgery next week.  She was prescribed oxycodone to take as needed for closed treatment with the use of Tylenol for baseline for pain.  She was told not to drive a car or drink alcohol for 8 hours after taking the oxycodone and until she gets casted for her wrist fracture.  There was no sign of compartment syndrome or neurovascular injury.  She did not hit her head and denies any neck pain so there is no sign of intracranial bleed or cervical spine fracture.  I felt she could be safely discharged home.    Diagnosis:    ICD-10-CM    1. Closed fracture of one rib of left side, initial encounter  S22.32XA     8th rib      2. Closed fracture of distal end of left radius, unspecified fracture morphology, initial encounter  S52.502A            Discharge Medications:  Discharge Medication List as of 8/31/2023  8:06 AM        START taking these medications    Details   oxyCODONE (ROXICODONE) 5 MG tablet Take 1 tablet (5 mg) by mouth every 6 hours as needed for pain No driving a car or drinking alcohol for at least 8 hours after taking this medication., Disp-12 tablet, R-0, Local Print                Milagros Marquez MD  8/31/2023          Milagros Marquez MD  08/31/23 9099       Milagros Marquez MD  08/31/23 1028

## 2023-09-19 ENCOUNTER — TRANSFERRED RECORDS (OUTPATIENT)
Dept: HEALTH INFORMATION MANAGEMENT | Facility: CLINIC | Age: 68
End: 2023-09-19
Payer: COMMERCIAL

## 2023-11-01 ENCOUNTER — APPOINTMENT (OUTPATIENT)
Dept: GENERAL RADIOLOGY | Facility: CLINIC | Age: 68
End: 2023-11-01
Attending: EMERGENCY MEDICINE
Payer: COMMERCIAL

## 2023-11-01 ENCOUNTER — HOSPITAL ENCOUNTER (OUTPATIENT)
Facility: CLINIC | Age: 68
Setting detail: OBSERVATION
Discharge: HOME | End: 2023-11-03
Attending: EMERGENCY MEDICINE | Admitting: STUDENT IN AN ORGANIZED HEALTH CARE EDUCATION/TRAINING PROGRAM
Payer: COMMERCIAL

## 2023-11-01 DIAGNOSIS — J44.1 COPD EXACERBATION (H): ICD-10-CM

## 2023-11-01 DIAGNOSIS — U07.1 COVID-19: ICD-10-CM

## 2023-11-01 LAB
ANION GAP SERPL CALCULATED.3IONS-SCNC: 11 MMOL/L (ref 7–15)
BASOPHILS # BLD AUTO: 0.1 10E3/UL (ref 0–0.2)
BASOPHILS NFR BLD AUTO: 1 %
BUN SERPL-MCNC: 17.8 MG/DL (ref 8–23)
CALCIUM SERPL-MCNC: 9 MG/DL (ref 8.8–10.2)
CHLORIDE SERPL-SCNC: 102 MMOL/L (ref 98–107)
CREAT SERPL-MCNC: 0.65 MG/DL (ref 0.51–0.95)
DEPRECATED HCO3 PLAS-SCNC: 23 MMOL/L (ref 22–29)
EGFRCR SERPLBLD CKD-EPI 2021: >90 ML/MIN/1.73M2
EOSINOPHIL # BLD AUTO: 0 10E3/UL (ref 0–0.7)
EOSINOPHIL NFR BLD AUTO: 0 %
ERYTHROCYTE [DISTWIDTH] IN BLOOD BY AUTOMATED COUNT: 13.1 % (ref 10–15)
FLUAV RNA SPEC QL NAA+PROBE: NEGATIVE
FLUBV RNA RESP QL NAA+PROBE: NEGATIVE
GLUCOSE SERPL-MCNC: 107 MG/DL (ref 70–99)
HCT VFR BLD AUTO: 44.4 % (ref 35–47)
HGB BLD-MCNC: 14.6 G/DL (ref 11.7–15.7)
IMM GRANULOCYTES # BLD: 0 10E3/UL
IMM GRANULOCYTES NFR BLD: 0 %
LACTATE SERPL-SCNC: 1.2 MMOL/L (ref 0.7–2)
LYMPHOCYTES # BLD AUTO: 1.1 10E3/UL (ref 0.8–5.3)
LYMPHOCYTES NFR BLD AUTO: 11 %
MCH RBC QN AUTO: 31.7 PG (ref 26.5–33)
MCHC RBC AUTO-ENTMCNC: 32.9 G/DL (ref 31.5–36.5)
MCV RBC AUTO: 96 FL (ref 78–100)
MONOCYTES # BLD AUTO: 0.9 10E3/UL (ref 0–1.3)
MONOCYTES NFR BLD AUTO: 9 %
NEUTROPHILS # BLD AUTO: 8.2 10E3/UL (ref 1.6–8.3)
NEUTROPHILS NFR BLD AUTO: 79 %
NRBC # BLD AUTO: 0 10E3/UL
NRBC BLD AUTO-RTO: 0 /100
PLATELET # BLD AUTO: 290 10E3/UL (ref 150–450)
POTASSIUM SERPL-SCNC: 3.9 MMOL/L (ref 3.4–5.3)
RBC # BLD AUTO: 4.61 10E6/UL (ref 3.8–5.2)
RSV RNA SPEC NAA+PROBE: NEGATIVE
SARS-COV-2 RNA RESP QL NAA+PROBE: POSITIVE
SODIUM SERPL-SCNC: 136 MMOL/L (ref 135–145)
WBC # BLD AUTO: 10.3 10E3/UL (ref 4–11)

## 2023-11-01 PROCEDURE — 83605 ASSAY OF LACTIC ACID: CPT | Performed by: EMERGENCY MEDICINE

## 2023-11-01 PROCEDURE — 87637 SARSCOV2&INF A&B&RSV AMP PRB: CPT | Performed by: STUDENT IN AN ORGANIZED HEALTH CARE EDUCATION/TRAINING PROGRAM

## 2023-11-01 PROCEDURE — 80048 BASIC METABOLIC PNL TOTAL CA: CPT | Performed by: EMERGENCY MEDICINE

## 2023-11-01 PROCEDURE — 96374 THER/PROPH/DIAG INJ IV PUSH: CPT

## 2023-11-01 PROCEDURE — 96361 HYDRATE IV INFUSION ADD-ON: CPT

## 2023-11-01 PROCEDURE — 82728 ASSAY OF FERRITIN: CPT | Performed by: STUDENT IN AN ORGANIZED HEALTH CARE EDUCATION/TRAINING PROGRAM

## 2023-11-01 PROCEDURE — 86140 C-REACTIVE PROTEIN: CPT | Performed by: STUDENT IN AN ORGANIZED HEALTH CARE EDUCATION/TRAINING PROGRAM

## 2023-11-01 PROCEDURE — 71046 X-RAY EXAM CHEST 2 VIEWS: CPT

## 2023-11-01 PROCEDURE — 250N000011 HC RX IP 250 OP 636: Mod: JZ | Performed by: EMERGENCY MEDICINE

## 2023-11-01 PROCEDURE — 36415 COLL VENOUS BLD VENIPUNCTURE: CPT | Performed by: EMERGENCY MEDICINE

## 2023-11-01 PROCEDURE — 87637 SARSCOV2&INF A&B&RSV AMP PRB: CPT | Performed by: EMERGENCY MEDICINE

## 2023-11-01 PROCEDURE — 258N000003 HC RX IP 258 OP 636: Performed by: EMERGENCY MEDICINE

## 2023-11-01 PROCEDURE — 250N000013 HC RX MED GY IP 250 OP 250 PS 637: Performed by: EMERGENCY MEDICINE

## 2023-11-01 PROCEDURE — 99285 EMERGENCY DEPT VISIT HI MDM: CPT | Mod: 25

## 2023-11-01 PROCEDURE — 96375 TX/PRO/DX INJ NEW DRUG ADDON: CPT

## 2023-11-01 PROCEDURE — 85025 COMPLETE CBC W/AUTO DIFF WBC: CPT | Performed by: EMERGENCY MEDICINE

## 2023-11-01 PROCEDURE — 84484 ASSAY OF TROPONIN QUANT: CPT | Performed by: STUDENT IN AN ORGANIZED HEALTH CARE EDUCATION/TRAINING PROGRAM

## 2023-11-01 PROCEDURE — G0378 HOSPITAL OBSERVATION PER HR: HCPCS

## 2023-11-01 PROCEDURE — 93005 ELECTROCARDIOGRAM TRACING: CPT

## 2023-11-01 PROCEDURE — 99222 1ST HOSP IP/OBS MODERATE 55: CPT | Performed by: STUDENT IN AN ORGANIZED HEALTH CARE EDUCATION/TRAINING PROGRAM

## 2023-11-01 RX ORDER — ALBUTEROL SULFATE 90 UG/1
2 AEROSOL, METERED RESPIRATORY (INHALATION) EVERY 4 HOURS PRN
Status: DISCONTINUED | OUTPATIENT
Start: 2023-11-01 | End: 2023-11-03 | Stop reason: HOSPADM

## 2023-11-01 RX ORDER — ASPIRIN 81 MG/1
81 TABLET ORAL DAILY
Status: DISCONTINUED | OUTPATIENT
Start: 2023-11-02 | End: 2023-11-03 | Stop reason: HOSPADM

## 2023-11-01 RX ORDER — DEXAMETHASONE SODIUM PHOSPHATE 10 MG/ML
8 INJECTION, SOLUTION INTRAMUSCULAR; INTRAVENOUS ONCE
Status: COMPLETED | OUTPATIENT
Start: 2023-11-01 | End: 2023-11-01

## 2023-11-01 RX ORDER — SERTRALINE HYDROCHLORIDE 25 MG/1
25 TABLET, FILM COATED ORAL DAILY
Status: DISCONTINUED | OUTPATIENT
Start: 2023-11-02 | End: 2023-11-02

## 2023-11-01 RX ORDER — ALBUTEROL SULFATE 90 UG/1
6 AEROSOL, METERED RESPIRATORY (INHALATION) ONCE
Status: COMPLETED | OUTPATIENT
Start: 2023-11-01 | End: 2023-11-01

## 2023-11-01 RX ORDER — METHYLPREDNISOLONE SODIUM SUCCINATE 125 MG/2ML
125 INJECTION, POWDER, LYOPHILIZED, FOR SOLUTION INTRAMUSCULAR; INTRAVENOUS ONCE
Status: COMPLETED | OUTPATIENT
Start: 2023-11-01 | End: 2023-11-01

## 2023-11-01 RX ORDER — METOPROLOL SUCCINATE 25 MG/1
50 TABLET, EXTENDED RELEASE ORAL
Status: DISCONTINUED | OUTPATIENT
Start: 2023-11-02 | End: 2023-11-03 | Stop reason: HOSPADM

## 2023-11-01 RX ADMIN — METHYLPREDNISOLONE SODIUM SUCCINATE 125 MG: 125 INJECTION, POWDER, FOR SOLUTION INTRAMUSCULAR; INTRAVENOUS at 20:02

## 2023-11-01 RX ADMIN — SODIUM CHLORIDE 1000 ML: 9 INJECTION, SOLUTION INTRAVENOUS at 18:55

## 2023-11-01 RX ADMIN — DEXAMETHASONE SODIUM PHOSPHATE 8 MG: 10 INJECTION, SOLUTION INTRAMUSCULAR; INTRAVENOUS at 22:17

## 2023-11-01 RX ADMIN — ALBUTEROL SULFATE 6 PUFF: 90 AEROSOL, METERED RESPIRATORY (INHALATION) at 20:06

## 2023-11-01 ASSESSMENT — ACTIVITIES OF DAILY LIVING (ADL)
ADLS_ACUITY_SCORE: 35

## 2023-11-01 NOTE — ED TRIAGE NOTES
Pt. Presents to ED with 1 day of increasing SOB, increasing cough, sore throat, fever (TMAX 100.7), body aches, nausea. Pt. Denies chest pain, palpitations, N/V/D. Reports she uses a NC @ night while sleeping, Hx of COPD. Denies sick contacts. Took sudafed @ 1230 today.      Triage Assessment (Adult)       Row Name 11/01/23 9251          Triage Assessment    Airway WDL WDL        Respiratory WDL    Respiratory WDL X;all     Rhythm/Pattern, Respiratory shortness of breath;dyspnea upon exertion;depth regular;pattern regular;unlabored     Expansion/Accessory Muscles/Retractions expansion symmetric;no retractions;no use of accessory muscles     Nailbeds no discoloration     Mucous Membranes intact;moist     Cough Frequency infrequent     Cough Type dry        Skin Circulation/Temperature WDL    Skin Circulation/Temperature WDL WDL        Cardiac WDL    Cardiac WDL WDL        Peripheral/Neurovascular WDL    Peripheral Neurovascular WDL WDL        Cognitive/Neuro/Behavioral WDL    Cognitive/Neuro/Behavioral WDL WDL

## 2023-11-01 NOTE — ED PROVIDER NOTES
History     Chief Complaint:  Cough, Fever, and Shortness of Breath       HPI   Pooja Trinidad is a 68 year old female with history of COPD who presents with 48 hours of influenza-like symptoms.  The patient developed body aches, runny nose, cough, and fever 2 days ago.  In the last 12 hours, she has had increased shortness of breath with exertion, sleepiness, and weakness.  She notes mild increase in wheezing consistent with previous COPD.  She denies chest pain, or any other concerns.  Patient uses oxygen at nighttime only.        Independent Historian:   none    Review of External Notes:   Reviewed pulmonology visit from 8/24/2023      Medications:    Albuterol pro-air  ASA 81  Caltrate  Trelegy  Combivent  Toprol  Roxicodone  Deltasone  Zoloft    Past Medical History:    COPD  Chronic respiratory failure with hypoxia    Past Surgical History:    Carotid angiogram       Physical Exam   Patient Vitals for the past 24 hrs:   BP Temp Temp src Pulse Resp SpO2 Height Weight   11/01/23 2055 (!) 147/81 -- -- 117 -- 90 % -- --   11/01/23 2050 -- -- -- -- -- 91 % -- --   11/01/23 2045 -- -- -- -- -- 94 % -- --   11/01/23 2040 -- -- -- -- -- 93 % -- --   11/01/23 2035 -- -- -- -- -- 94 % -- --   11/01/23 2030 -- -- -- -- -- 93 % -- --   11/01/23 2025 -- -- -- -- -- 92 % -- --   11/01/23 2020 -- -- -- -- -- 92 % -- --   11/01/23 2015 -- -- -- -- -- 94 % -- --   11/01/23 2010 -- -- -- -- -- 97 % -- --   11/01/23 2005 -- -- -- -- -- 90 % -- --   11/01/23 2000 -- -- -- -- -- 96 % -- --   11/1955 -- -- -- -- -- 95 % -- --   11/01/23 1950 -- -- -- -- -- 91 % -- --   11/01/23 1945 -- -- -- -- -- 91 % -- --   11/01/23 1940 -- -- -- -- -- 94 % -- --   11/01/23 1935 -- -- -- -- -- 92 % -- --   11/01/23 1930 -- -- -- -- -- 94 % -- --   11/01/23 1925 -- -- -- -- -- (!) 79 % -- --   11/01/23 1920 -- -- -- -- -- 94 % -- --   11/01/23 1915 -- -- -- -- -- 95 % -- --   11/01/23 1910 -- -- -- -- -- 92 % -- --   11/01/23 1905  "-- -- -- -- -- 95 % -- --   11/01/23 1900 -- -- -- -- -- 96 % -- --   11/01/23 1855 -- -- -- -- -- 92 % -- --   11/01/23 1825 -- -- -- -- -- 92 % -- --   11/01/23 1643 (!) 120/93 98.8  F (37.1  C) Temporal 108 18 93 % 1.651 m (5' 5\") 89.8 kg (198 lb)        Physical Exam  Constitutional: Alert, attentive, speaking full sentences  HENT:    Nose: Nose normal.    Mouth/Throat: Oropharynx is clear, mucous membranes are moist   Eyes: EOM are normal.   CV: regular rate and rhythm; no murmurs, rubs or gallups  Chest: Effort normal but end expiratory wheezes bilaterally  GI:  There is no tenderness. No distension. Normal bowel sounds  MSK: Normal range of motion.  No lower extremity edema  Neurological: Alert, attentive.  Generally weak, but able to transition and walk independently  Skin: Skin is warm and dry.      Emergency Department Course     Imaging:  Chest XR,  PA & LAT   Final Result   IMPRESSION: No change. Heart size and pulmonary vessels normal. Lungs appear mildly hyperinflated. Linear scarring at lung bases stable. Old right rib fracture.           Laboratory:  Labs Ordered and Resulted from Time of ED Arrival to Time of ED Departure   INFLUENZA A/B, RSV, & SARS-COV2 PCR - Abnormal       Result Value    Influenza A PCR Negative      Influenza B PCR Negative      RSV PCR Negative      SARS CoV2 PCR Positive (*)    BASIC METABOLIC PANEL - Abnormal    Sodium 136      Potassium 3.9      Chloride 102      Carbon Dioxide (CO2) 23      Anion Gap 11      Urea Nitrogen 17.8      Creatinine 0.65      GFR Estimate >90      Calcium 9.0      Glucose 107 (*)    LACTIC ACID WHOLE BLOOD - Normal    Lactic Acid 1.2     CBC WITH PLATELETS AND DIFFERENTIAL    WBC Count 10.3      RBC Count 4.61      Hemoglobin 14.6      Hematocrit 44.4      MCV 96      MCH 31.7      MCHC 32.9      RDW 13.1      Platelet Count 290      % Neutrophils 79      % Lymphocytes 11      % Monocytes 9      % Eosinophils 0      % Basophils 1      % Immature " Granulocytes 0      NRBCs per 100 WBC 0      Absolute Neutrophils 8.2      Absolute Lymphocytes 1.1      Absolute Monocytes 0.9      Absolute Eosinophils 0.0      Absolute Basophils 0.1      Absolute Immature Granulocytes 0.0      Absolute NRBCs 0.0         Emergency Department Course & Assessments:    Interventions:  Medications   albuterol (PROVENTIL HFA/VENTOLIN HFA) inhaler (has no administration in time range)   aspirin EC tablet 81 mg (has no administration in time range)   metoprolol succinate ER (TOPROL XL) 24 hr tablet 50 mg (has no administration in time range)   sertraline (ZOLOFT) tablet 25 mg (has no administration in time range)   nirmatrelvir and ritonavir (PAXLOVID) 300 mg/100 mg therapy pack 3 tablet (has no administration in time range)   dexAMETHasone (DECADRON) tablet 6 mg (has no administration in time range)   ipratropium-albuterol (COMBIVENT RESPIMAT) inhaler 1 puff (has no administration in time range)   sodium chloride 0.9% BOLUS 1,000 mL (0 mLs Intravenous Stopped 11/1/23 2217)   albuterol (PROVENTIL HFA/VENTOLIN HFA) inhaler (6 puffs Inhalation $Given 11/1/23 2006)   methylPREDNISolone sodium succinate (solu-MEDROL) injection 125 mg (125 mg Intravenous $Given 11/1/23 2002)   dexAMETHasone PF (DECADRON) injection 8 mg (8 mg Intravenous $Given 11/1/23 2217)        Assessments:  1823 I examined the patient and obtained history as noted above.  2051 I rechecked and updated the patient.  2135 I rechecked and updated the patient.    Independent Interpretation (X-rays, CTs, rhythm strip):  No infiltrate on chest x-ray    Consultations/Discussion of Management or Tests:  2210 I spoke with Dr. Thornton, of the hospitalist team, regarding the patient. They accepted the patient for admission.    Social Determinants of Health affecting care:   None    Disposition:  The patient was admitted to the hospital under the care of Dr. Thornton.     Impression & Plan      Medical Decision Making:  This a pleasant  68-year-old female who presents for evaluation of influenza-like illness and is found to have COVID-19.  Likely related to the same, the patient has significant weakness, relative hypoxemia with resting oxygen saturation of 90% and desaturation with ambulation, as well as COPD exacerbation.  Despite Solu-Medrol and bronchodilator therapy, the patient remains with the above symptomatology.  There are no other concerning hematologic or metabolic parameters and lactic acid is normal.  Chest x-ray shows no infiltrate.  Given COPD exacerbation and relative hypoxemia, as well as desaturation with ambulation, patiently admitted for continued cares to the observation unit.  She is medically stable and safe for admission at this time.      Diagnosis:    ICD-10-CM    1. COVID-19  U07.1       2. COPD exacerbation (H)  J44.1              Scribe Disclosure:  IDavid, am serving as a scribe at 6:11 PM on 11/1/2023 to document services personally performed by Yang Stanley MD based on my observations and the provider's statements to me.     11/1/2023   Yang Stanley MD Houghland, John Eric, MD  11/02/23 0041

## 2023-11-02 LAB
CRP SERPL-MCNC: 44.65 MG/L
D DIMER PPP FEU-MCNC: 0.79 UG/ML FEU (ref 0–0.5)
FERRITIN SERPL-MCNC: 73 NG/ML (ref 11–328)
INR PPP: 0.95 (ref 0.85–1.15)
TROPONIN T SERPL HS-MCNC: 15 NG/L

## 2023-11-02 PROCEDURE — 250N000009 HC RX 250: Performed by: STUDENT IN AN ORGANIZED HEALTH CARE EDUCATION/TRAINING PROGRAM

## 2023-11-02 PROCEDURE — G0378 HOSPITAL OBSERVATION PER HR: HCPCS

## 2023-11-02 PROCEDURE — 99232 SBSQ HOSP IP/OBS MODERATE 35: CPT | Performed by: INTERNAL MEDICINE

## 2023-11-02 PROCEDURE — 85610 PROTHROMBIN TIME: CPT | Performed by: STUDENT IN AN ORGANIZED HEALTH CARE EDUCATION/TRAINING PROGRAM

## 2023-11-02 PROCEDURE — 250N000013 HC RX MED GY IP 250 OP 250 PS 637: Performed by: STUDENT IN AN ORGANIZED HEALTH CARE EDUCATION/TRAINING PROGRAM

## 2023-11-02 PROCEDURE — 250N000013 HC RX MED GY IP 250 OP 250 PS 637: Performed by: INTERNAL MEDICINE

## 2023-11-02 PROCEDURE — 85379 FIBRIN DEGRADATION QUANT: CPT | Performed by: STUDENT IN AN ORGANIZED HEALTH CARE EDUCATION/TRAINING PROGRAM

## 2023-11-02 PROCEDURE — 36415 COLL VENOUS BLD VENIPUNCTURE: CPT | Performed by: STUDENT IN AN ORGANIZED HEALTH CARE EDUCATION/TRAINING PROGRAM

## 2023-11-02 PROCEDURE — 250N000012 HC RX MED GY IP 250 OP 636 PS 637: Performed by: STUDENT IN AN ORGANIZED HEALTH CARE EDUCATION/TRAINING PROGRAM

## 2023-11-02 RX ORDER — AMLODIPINE BESYLATE 5 MG/1
5 TABLET ORAL DAILY
Status: DISCONTINUED | OUTPATIENT
Start: 2023-11-02 | End: 2023-11-03 | Stop reason: HOSPADM

## 2023-11-02 RX ORDER — ROSUVASTATIN CALCIUM 5 MG/1
5 TABLET, COATED ORAL AT BEDTIME
Status: DISCONTINUED | OUTPATIENT
Start: 2023-11-02 | End: 2023-11-03 | Stop reason: HOSPADM

## 2023-11-02 RX ORDER — ACETAMINOPHEN 650 MG/1
650 SUPPOSITORY RECTAL EVERY 6 HOURS PRN
Status: DISCONTINUED | OUTPATIENT
Start: 2023-11-02 | End: 2023-11-03 | Stop reason: HOSPADM

## 2023-11-02 RX ORDER — AMLODIPINE BESYLATE 5 MG/1
5 TABLET ORAL DAILY
COMMUNITY
Start: 2023-07-21

## 2023-11-02 RX ORDER — DEXAMETHASONE 2 MG/1
6 TABLET ORAL DAILY
Status: DISCONTINUED | OUTPATIENT
Start: 2023-11-02 | End: 2023-11-02

## 2023-11-02 RX ORDER — AMOXICILLIN 250 MG
1 CAPSULE ORAL 2 TIMES DAILY PRN
Status: DISCONTINUED | OUTPATIENT
Start: 2023-11-02 | End: 2023-11-03 | Stop reason: HOSPADM

## 2023-11-02 RX ORDER — ONDANSETRON 4 MG/1
4 TABLET, ORALLY DISINTEGRATING ORAL EVERY 6 HOURS PRN
Status: DISCONTINUED | OUTPATIENT
Start: 2023-11-02 | End: 2023-11-03 | Stop reason: HOSPADM

## 2023-11-02 RX ORDER — AMOXICILLIN 250 MG
2 CAPSULE ORAL 2 TIMES DAILY PRN
Status: DISCONTINUED | OUTPATIENT
Start: 2023-11-02 | End: 2023-11-03 | Stop reason: HOSPADM

## 2023-11-02 RX ORDER — ROSUVASTATIN CALCIUM 5 MG/1
5 TABLET, COATED ORAL AT BEDTIME
Status: ON HOLD | COMMUNITY
Start: 2022-12-08 | End: 2023-11-03

## 2023-11-02 RX ORDER — ONDANSETRON 2 MG/ML
4 INJECTION INTRAMUSCULAR; INTRAVENOUS EVERY 6 HOURS PRN
Status: DISCONTINUED | OUTPATIENT
Start: 2023-11-02 | End: 2023-11-03 | Stop reason: HOSPADM

## 2023-11-02 RX ORDER — ACETAMINOPHEN 325 MG/1
650 TABLET ORAL EVERY 6 HOURS PRN
Status: DISCONTINUED | OUTPATIENT
Start: 2023-11-02 | End: 2023-11-03 | Stop reason: HOSPADM

## 2023-11-02 RX ADMIN — METOPROLOL SUCCINATE 50 MG: 25 TABLET, EXTENDED RELEASE ORAL at 16:02

## 2023-11-02 RX ADMIN — Medication 1 LOZENGE: at 09:11

## 2023-11-02 RX ADMIN — ACETAMINOPHEN 650 MG: 325 TABLET, FILM COATED ORAL at 10:47

## 2023-11-02 RX ADMIN — AMLODIPINE BESYLATE 5 MG: 5 TABLET ORAL at 12:33

## 2023-11-02 RX ADMIN — METOPROLOL SUCCINATE 50 MG: 25 TABLET, EXTENDED RELEASE ORAL at 09:11

## 2023-11-02 RX ADMIN — SERTRALINE HYDROCHLORIDE 50 MG: 50 TABLET ORAL at 12:33

## 2023-11-02 RX ADMIN — ACETAMINOPHEN 650 MG: 325 TABLET, FILM COATED ORAL at 19:48

## 2023-11-02 RX ADMIN — ALBUTEROL SULFATE 2 PUFF: 90 AEROSOL, METERED RESPIRATORY (INHALATION) at 19:41

## 2023-11-02 RX ADMIN — IPRATROPIUM BROMIDE AND ALBUTEROL 1 PUFF: 20; 100 SPRAY, METERED RESPIRATORY (INHALATION) at 01:55

## 2023-11-02 RX ADMIN — DEXAMETHASONE 6 MG: 2 TABLET ORAL at 09:11

## 2023-11-02 RX ADMIN — Medication 1 LOZENGE: at 19:51

## 2023-11-02 RX ADMIN — ASPIRIN 81 MG: 81 TABLET, COATED ORAL at 09:11

## 2023-11-02 RX ADMIN — ALBUTEROL SULFATE 2 PUFF: 90 AEROSOL, METERED RESPIRATORY (INHALATION) at 12:30

## 2023-11-02 ASSESSMENT — ACTIVITIES OF DAILY LIVING (ADL)
ADLS_ACUITY_SCORE: 20
ADLS_ACUITY_SCORE: 20
HEARING_DIFFICULTY_OR_DEAF: NO
ADLS_ACUITY_SCORE: 20
CHANGE_IN_FUNCTIONAL_STATUS_SINCE_ONSET_OF_CURRENT_ILLNESS/INJURY: NO
ADLS_ACUITY_SCORE: 20
ADLS_ACUITY_SCORE: 20
TOILETING_ISSUES: NO
ADLS_ACUITY_SCORE: 20
WEAR_GLASSES_OR_BLIND: NO
ADLS_ACUITY_SCORE: 20
DIFFICULTY_EATING/SWALLOWING: NO
ADLS_ACUITY_SCORE: 35
ADLS_ACUITY_SCORE: 20
ADLS_ACUITY_SCORE: 20
CONCENTRATING,_REMEMBERING_OR_MAKING_DECISIONS_DIFFICULTY: NO
DOING_ERRANDS_INDEPENDENTLY_DIFFICULTY: NO
FALL_HISTORY_WITHIN_LAST_SIX_MONTHS: NO
DIFFICULTY_COMMUNICATING: NO
DRESSING/BATHING_DIFFICULTY: NO
WALKING_OR_CLIMBING_STAIRS_DIFFICULTY: NO
ADLS_ACUITY_SCORE: 20
ADLS_ACUITY_SCORE: 20

## 2023-11-02 NOTE — ED NOTES
Failed Ambulation trail with o2 sat before ambulation sat at 91% with ambulation trail desat to 87% pt short of breath. Once at rest o2 sat at 92%

## 2023-11-02 NOTE — ED NOTES
[facility]  ED Nurse Handoff Report    ED Chief complaint: Cough, Fever, and Shortness of Breath  . ED Diagnosis:   Final diagnoses:   COVID-19   COPD exacerbation (H)       Allergies:   Allergies   Allergen Reactions    Azithromycin Other (See Comments)     Tolerated oral azithromycin in the past, but on 12/3/21 developed lip tingling and a warm feeling in her stomach shortly after starting IV azithromycin.  Symptoms resolved with IV Benadryl    Levofloxacin Diarrhea and GI Disturbance     Would like to avoid    Nickel Rash     Contact dermatitis   Contact dermatitis   Contact dermatitis   Contact dermatitis       Animal Dander Other (See Comments)    No Clinical Screening - See Comments Other (See Comments)     Environmental allergies    Other Environmental Allergy Other (See Comments)       Code Status: Full Code    Activity level - Baseline/Home:  independent.  Activity Level - Current:   standby.   Lift room needed: No.   Bariatric: No   Needed: No   Isolation: Yes.   Infection: Not Applicable  COVID r/o and special precautions.     Respiratory status: Room air, Pt uses 2L NC at bedtime at baseline    Vital Signs (within 30 minutes):   Vitals:    11/01/23 2040 11/01/23 2045 11/01/23 2050 11/01/23 2055   BP:    (!) 147/81   Pulse:    117   Resp:       Temp:       TempSrc:       SpO2: 93% 94% 91% 90%   Weight:       Height:           Cardiac Rhythm:  ,      Pain level:    Patient confused: No.   Patient Falls Risk: Pt/family education  Elimination Status: Has voided     Patient Report - Initial Complaint: Cough, shortness of breath.   Focused Assessment: Pt. Presents to ED with 1 day of increasing SOB, increasing cough, sore throat, fever (TMAX 100.7), body aches, nausea. Pt. Denies chest pain, palpitations, N/V/D. Reports she uses a NC @ night while sleeping, Hx of COPD. Denies sick contacts. Took sudafed @ 1230 today.   Increased work of breathing, dyspnea with exertion,  shortness of breath        Abnormal Results:   Labs Ordered and Resulted from Time of ED Arrival to Time of ED Departure   INFLUENZA A/B, RSV, & SARS-COV2 PCR - Abnormal       Result Value    Influenza A PCR Negative      Influenza B PCR Negative      RSV PCR Negative      SARS CoV2 PCR Positive (*)    BASIC METABOLIC PANEL - Abnormal    Sodium 136      Potassium 3.9      Chloride 102      Carbon Dioxide (CO2) 23      Anion Gap 11      Urea Nitrogen 17.8      Creatinine 0.65      GFR Estimate >90      Calcium 9.0      Glucose 107 (*)    LACTIC ACID WHOLE BLOOD - Normal    Lactic Acid 1.2     CBC WITH PLATELETS AND DIFFERENTIAL    WBC Count 10.3      RBC Count 4.61      Hemoglobin 14.6      Hematocrit 44.4      MCV 96      MCH 31.7      MCHC 32.9      RDW 13.1      Platelet Count 290      % Neutrophils 79      % Lymphocytes 11      % Monocytes 9      % Eosinophils 0      % Basophils 1      % Immature Granulocytes 0      NRBCs per 100 WBC 0      Absolute Neutrophils 8.2      Absolute Lymphocytes 1.1      Absolute Monocytes 0.9      Absolute Eosinophils 0.0      Absolute Basophils 0.1      Absolute Immature Granulocytes 0.0      Absolute NRBCs 0.0          Chest XR,  PA & LAT   Final Result   IMPRESSION: No change. Heart size and pulmonary vessels normal. Lungs appear mildly hyperinflated. Linear scarring at lung bases stable. Old right rib fracture.          Treatments provided: see MAR  Family Comments: daughter was at bedside, has since gone home  OBS brochure/video discussed/provided to patient:  Yes  ED Medications:   Medications   sodium chloride 0.9% BOLUS 1,000 mL (0 mLs Intravenous Stopped 11/1/23 2217)   albuterol (PROVENTIL HFA/VENTOLIN HFA) inhaler (6 puffs Inhalation $Given 11/1/23 2006)   methylPREDNISolone sodium succinate (solu-MEDROL) injection 125 mg (125 mg Intravenous $Given 11/1/23 2002)   dexAMETHasone PF (DECADRON) injection 8 mg (8 mg Intravenous $Given 11/1/23 2217)       Drips infusing:   No  For the majority of the shift this patient was Green.   Interventions performed were N/a.    Sepsis treatment initiated: No    Cares/treatment/interventions/medications to be completed following ED care:     ED Nurse Name: Sandy Dooley RN  10:31 PM    RECEIVING UNIT ED HANDOFF REVIEW    Above ED Nurse Handoff Report was reviewed: Yes  Reviewed by: Xenia Christianson RN on November 2, 2023 at 12:47 AM

## 2023-11-02 NOTE — H&P
[facility]    History and Physical - Hospitalist Service       Date of Admission:  11/1/2023    Assessment & Plan      Pooja Trinidad is a 68 year old female the past medical history significant for COPD, fully vaccinated for COVID presented to the ED with URI symptoms and shortness of breath and was found to have viral pneumonia due to COVID.        # Confirmed COVID-19 infection    # Viral Pneumonia secondary to COVID-19 infection     Symptom Onset 10/31/2023   Date of 1st Positive Test 11/01/2023   Vaccination Status Fully Vaccinated       - COVID-19 special precautions, continuous pulse-ox  - Oxygen: continue current support with nasal cannula at 1-2 L/min; titrate to keep SpO2 between 90-96%  - Labs: Standard COVID admission labs ordered (CBC with diff, CMP, INR, D-dimer, CRP).   - Imaging: no additional imaging needed at this time  - Breathing treatments:  Home inhalers ; avoid nebulizers in favor of MDIs   - IV fluids: not indicated at this time  - Antibiotics: not indicated   - COVID-Focused Medications: Dexamethasone and paxlovid per COVID therapeutic order set   - DVT Prophylaxis:         - At high risk of thrombotic complications due to COVID-19 (DDimer = N/A )            COPD  Patient reported worsening cough due to her COVID.  No sputum production.  No wheezing on exam.     -Low suspicion for COPD exacerbation at this time given no wheezing, however patient is going to get a steroids anyways due to her COVID.   -Will resume home inhalers    Hx of afib s/p ablation  -resume home metoprolol  -not on DOAC PTA     Diet:  regular diet  DVT Prophylaxis: Pneumatic Compression Devices/re evaluate after d-dimer  Gomes Catheter: Not present  Lines: None     Cardiac Monitoring: None  Code Status:  Full code    Clinically Significant Risk Factors Present on Admission                # Drug Induced Platelet Defect: home medication list includes an antiplatelet medication        #  "Obesity: Estimated body mass index is 32.95 kg/m  as calculated from the following:    Height as of this encounter: 1.651 m (5' 5\").    Weight as of this encounter: 89.8 kg (198 lb).       # COPD: noted on problem list        Disposition Plan      Expected Discharge Date: 11/02/2023                  Violet Thornton MD  Hospitalist Service  [facility]  Securely message with [employer] (more info)  Text page via [employer] Paging/Directory     ______________________________________________________________________    Chief Complaint   sob    History is obtained from the patient    History of Present Illness   Pooja Trinidad is a 68 year old female the past medical history significant for COPD, fully vaccinated for COVID presented to the ED with URI symptoms and shortness of breath and was found to have viral pneumonia due to COVID.     Patient said that she was fine until yesterday when she started developing congestion, runny nose, crease coughing.  She was unable to lay in bed as it was worsening her congestion and causing difficulty breathing.  Today she noticed that she is getting short of breath with minor ambulations so she decided to come to the ED for further evaluation.  Besides that she also reported myalgias.  No fever or chills.  No chest pain abdominal pain or diarrhea.  She used usually uses oxygen at night about 1 to 2 L nasal cannula due to her COPD.     On presentation to the ED she was afebrile tachycardic up to 108 normotensive 120/93 and saturating well on room air.  Labs unremarkable.  Chest x-ray without any focal pathology  In the ED her O2 sats were at 91% which dips down to 87% with trial of ambulation.  Asked to admit this lady given her age and borderline hypoxia.     Past Medical History    No past medical history on file.    Past Surgical History   Past Surgical History:   Procedure Laterality Date    IR CAROTID ANGIOGRAM  3/5/2012    IR CAROTID ANGIOGRAM  3/5/2012    IR " CAROTID ANGIOGRAM  3/6/2012    IR MISCELLANEOUS PROCEDURE  3/5/2012    IR MISCELLANEOUS PROCEDURE  3/5/2012    IR MISCELLANEOUS PROCEDURE  3/6/2012    IR MISCELLANEOUS PROCEDURE  3/6/2012       Prior to Admission Medications   Prior to Admission Medications   Prescriptions Last Dose Informant Patient Reported? Taking?   Aspirin-Caffeine 500-32.5 MG TABS  Self Yes No   Sig: Take 2 tablets by mouth 2 times daily as needed   Fluticasone-Umeclidin-Vilanterol (TRELEGY ELLIPTA) 100-62.5-25 MCG/INH oral inhaler  Self Yes No   Sig: Inhale 1 puff into the lungs daily   albuterol (PROAIR HFA/PROVENTIL HFA/VENTOLIN HFA) 108 (90 Base) MCG/ACT inhaler  Self Yes No   Sig: Inhale 2 puffs into the lungs Take every 4-6 hours as needed   aspirin (ASA) 81 MG EC tablet  Self Yes No   Sig: Take 81 mg by mouth daily   calcium carbonate 600 mg-vitamin D 400 units (CALTRATE) 600-400 MG-UNIT per tablet  Self Yes No   Sig: Take 1 tablet by mouth daily (with breakfast)   ipratropium - albuterol 0.5 mg/2.5 mg/3 mL (DUONEB) 0.5-2.5 (3) MG/3ML neb solution  Self Yes No   Sig: Inhale 3 mLs into the lungs every 6 hours as needed   ipratropium-albuterol (COMBIVENT RESPIMAT)  MCG/ACT inhaler  Self Yes No   Sig: Inhale 1 puff into the lungs 4 times daily as needed   metoprolol succinate ER (TOPROL-XL) 50 MG 24 hr tablet  Self Yes No   Sig: Take 50 mg by mouth 2 times daily   multivitamin w/minerals (MULTIVITAMIN, THERAPEUTIC WITH MINERALS) tablet  Self Yes No   Sig: Take 1 tablet by mouth Every Mon, Wed, Fri Morning   oxyCODONE (ROXICODONE) 5 MG tablet   No No   Sig: Take 1 tablet (5 mg) by mouth every 6 hours as needed for pain No driving a car or drinking alcohol for at least 8 hours after taking this medication.   predniSONE (DELTASONE) 10 MG tablet   No No   Si tabs daily for 2 days, then 3 tabs daily for 2 days, then 2 tabs daily for 2 days, then 1 tab daily for 2 days, then stop   sertraline (ZOLOFT) 25 MG tablet  Self Yes No   Sig:  Take 25 mg by mouth daily      Facility-Administered Medications: None        Review of Systems    The 10 point Review of Systems is negative other than noted in the HPI or here.      Physical Exam   Vital Signs: Temp: 98.8  F (37.1  C) Temp src: Temporal BP: (!) 147/81 Pulse: 117   Resp: 18 SpO2: 90 % O2 Device: None (Room air)    Weight: 198 lbs 0 oz    Constitutional: awake, alert, cooperative, no apparent distress, and appears stated age, appears unwell  Eyes: Anicteric sclera  ENT: Cephalic, atraumatic, moist mucous membrane  Respiratory: Not in respiratory distress, equal air entry bilaterally, no wheezing  Cardiovascular: Tachycardic, no murmur  GI: Soft, nontender, nondistended  Skin: no bruising or bleeding  Musculoskeletal: no lower extremity pitting edema present  Neurologic: Awake, alert, oriented to name, place and time.  Neuropsychiatric: Pleasant    Medical Decision Making       60 MINUTES SPENT BY ME on the date of service doing chart review, history, exam, documentation & further activities per the note.        Data   ------------------------- PAST 24 HR DATA REVIEWED -----------------------------------------------

## 2023-11-02 NOTE — PHARMACY-ADMISSION MEDICATION HISTORY
Pharmacist Admission Medication History    Admission medication history is complete. The information provided in this note is only as accurate as the sources available at the time of the update.    Information Source(s): Patient via phone    Pertinent Information: -    Changes made to PTA medication list:  Added: amlodipine, rosuvastatin  Deleted: oxycodone, prednisone, multivitamin  Changed: sertraline 25 mg to 50 mg daily    Medication Affordability:  Not including over the counter (OTC) medications, was there a time in the past 3 months when you did not take your medications as prescribed because of cost?: No    Allergies reviewed with patient and updates made in EHR: yes    Medication History Completed By: Nette Garcia RPH 11/2/2023 8:51 AM      Prior to Admission medications    Medication Sig Last Dose Taking? Auth Provider Long Term End Date   albuterol (PROAIR HFA/PROVENTIL HFA/VENTOLIN HFA) 108 (90 Base) MCG/ACT inhaler Inhale 2 puffs into the lungs Take every 4-6 hours as needed Unknown at - Yes Unknown, Entered By History     amLODIPine (NORVASC) 5 MG tablet Take 5 mg by mouth daily 11/1/2023 at am Yes Unknown, Entered By History Yes    aspirin (ASA) 81 MG EC tablet Take 81 mg by mouth daily 11/1/2023 at - Yes Unknown, Entered By History     Aspirin-Caffeine 500-32.5 MG TABS Take 2 tablets by mouth 2 times daily as needed Unknown at prn Yes Unknown, Entered By History     calcium carbonate 600 mg-vitamin D 400 units (CALTRATE) 600-400 MG-UNIT per tablet Take 1 tablet by mouth daily (with breakfast) 11/1/2023 at am Yes Unknown, Entered By History     Fluticasone-Umeclidin-Vilanterol (TRELEGY ELLIPTA) 100-62.5-25 MCG/INH oral inhaler Inhale 1 puff into the lungs daily 11/1/2023 at am Yes Unknown, Entered By History     ipratropium - albuterol 0.5 mg/2.5 mg/3 mL (DUONEB) 0.5-2.5 (3) MG/3ML neb solution Inhale 3 mLs into the lungs every 6 hours as needed Unknown at prn Yes Unknown, Entered By History      ipratropium-albuterol (COMBIVENT RESPIMAT)  MCG/ACT inhaler Inhale 1 puff into the lungs 4 times daily as needed Unknown at prn Yes Unknown, Entered By History     metoprolol succinate ER (TOPROL-XL) 50 MG 24 hr tablet Take 50 mg by mouth 2 times daily 11/1/2023 at am Yes Unknown, Entered By History Yes    rosuvastatin (CRESTOR) 5 MG tablet Take 5 mg by mouth at bedtime 10/31/2023 at - Yes Unknown, Entered By History Yes    sertraline (ZOLOFT) 50 MG tablet Take 50 mg by mouth daily 11/1/2023 at - Yes Unknown, Entered By History Yes

## 2023-11-02 NOTE — PLAN OF CARE
End of Shift Summary  For vital signs and complete assessments, please see documentation flowsheets.     Pertinent assessments: Pt A&Ox4, up SBA in room. C/o headache, tylenol given. VSS on RA, uses 2L at night at baseline.     Major Shift Events: none    Treatment Plan: Continue paxlovid, inhalers, trend CRP    Goal Outcome Evaluation:      Plan of Care Reviewed With: patient    Overall Patient Progress: improvingOverall Patient Progress: improving

## 2023-11-02 NOTE — ED NOTES
Pt ambulated independently, pulse oximeter read 92 % while walking until patient started coughing, sats dropped to 88% then. Increased work of breathing noted.

## 2023-11-02 NOTE — PLAN OF CARE
Pertinent assessments: A&Ox4. Up SBA. VSS. 2LNC. Dyspnea on exertion. LS exp wheezes. Infrequent dry cough.   Major Shift Events ED admission  Treatment Plan: o2 monitoring, decadron, inhalers, Paxlovid   Bedside Nurse: Xenia Christianson RN

## 2023-11-02 NOTE — PROGRESS NOTES
[facility]    Hospitalist Progress Note  Name: Pooja Trinidad    MRN: [id number]  Provider:  Freeman Alonso DO  Date of Service: 11/02/2023    Summary of Stay: Pooja Trinidad is a 68 year old female the past medical history significant for COPD, fully vaccinated for COVID presented to the ED with URI symptoms and shortness of breath and was found to have viral pneumonia due to COVID.     TODAY'S PLAN:  Continue dexamethasone and paxolovid.  Pt states she wears 2LNC at night only.  Feeling a bit better today.  Will keep overnight and discharge home tomorrow morning.    Problem List:   Acute on Chronic Hypoxic Respiratory Failure  Covid 19 Infection  - CXR negative for infiltrates  - Dexamethasone 6 mg daily x10 days  - Paxlovid  - Pt wears 2LNC at night at baseline  - Wean O2 as able    COPD  - Not an acute exacerbation  - Resume PTA Trelegy at discharge    Chronic Medical Problems:  Paroxysmal Atrial Fibrillation s/p Ablation  Hx of Nicotine Dependence with Cigarettes    I spent 47 minutes in reviewing this patient's labs, imaging, medications, medical history.  In addition time was spent interviewing the patient, communicating with family, and medical decision making.     DVT Prophylaxis: Pneumatic Compression Devices  Code Status: Full Code  Diet: Regular Diet Adult    Gomes Catheter: Not present  Disposition: Expected discharge tomorrow to home. Goals prior to discharge include oxygen requirements improved.   Family updated today: No     Interval History   Pt seen and examined.  Pt reports some improvement in her breathing.    -Data reviewed today: I personally reviewed all new labs and imaging results over the last 24 hours.     Physical Exam   Temp: 98.7  F (37.1  C) Temp src: Oral BP: 124/70 Pulse: 81   Resp: 16 SpO2: 97 % O2 Device: Nasal cannula Oxygen Delivery: 2 LPM  Vitals:    11/01/23 1643 11/02/23 0102   Weight: 89.8 kg (198 lb) 88.2 kg (194 lb 7.2 oz)     Vital Signs  "with Ranges  Temp:  [98.7  F (37.1  C)-98.8  F (37.1  C)] 98.7  F (37.1  C)  Pulse:  [] 81  Resp:  [16-18] 16  BP: (118-149)/() 124/70  SpO2:  [79 %-97 %] 97 %  No intake/output data recorded.    GENERAL: No apparent distress. Awake, alert, and fully oriented.  HEENT: Normocephalic, atraumatic. Extraocular movements intact.  CARDIOVASCULAR: Regular rate and rhythm without murmurs or rubs. No S3.  PULMONARY: Clear bilaterally.  GASTROINTESTINAL: Soft, non-tender, non-distended. Bowel sounds normoactive.   EXTREMITIES: No cyanosis or clubbing. No edema.  NEUROLOGICAL: CN 2-12 grossly intact, no focal neurological deficits.  DERMATOLOGICAL: No rash, ulcer, bruising, nor jaundice.    Medications      amLODIPine  5 mg Oral Daily    aspirin  81 mg Oral Daily    dexAMETHasone  6 mg Oral Daily    metoprolol succinate ER  50 mg Oral BID    nirmatrelvir and ritonavir  3 tablet Oral BID    rosuvastatin  5 mg Oral At Bedtime    sertraline  50 mg Oral Daily     Data     Laboratory:  Recent Labs   Lab 11/01/23  1853   WBC 10.3   HGB 14.6   HCT 44.4   MCV 96        Recent Labs   Lab 11/01/23  1853      POTASSIUM 3.9   CHLORIDE 102   CO2 23   ANIONGAP 11   *   BUN 17.8   CR 0.65   GFRESTIMATED >90   GENIA 9.0     No results for input(s): \"CULT\" in the last 168 hours.    Imaging:  Recent Results (from the past 24 hour(s))   Chest XR,  PA & LAT    Narrative    EXAM: XR CHEST 2 VIEWS  LOCATION: [facility]  DATE: 11/1/2023    INDICATION: dyspnea  COMPARISON: 08/31/2023      Impression    IMPRESSION: No change. Heart size and pulmonary vessels normal. Lungs appear mildly hyperinflated. Linear scarring at lung bases stable. Old right rib fracture.         Freeman Alonso DO  [facility] Hospitalist  [address] [place] [address]  Securely message with [employer] (more info)  Text page via [employer] Paging/Directory   11/02/2023   "

## 2023-11-03 VITALS
RESPIRATION RATE: 20 BRPM | DIASTOLIC BLOOD PRESSURE: 77 MMHG | HEIGHT: 65 IN | SYSTOLIC BLOOD PRESSURE: 130 MMHG | OXYGEN SATURATION: 93 % | WEIGHT: 194.45 LBS | BODY MASS INDEX: 32.4 KG/M2 | TEMPERATURE: 97.9 F | HEART RATE: 79 BPM

## 2023-11-03 PROCEDURE — 250N000013 HC RX MED GY IP 250 OP 250 PS 637: Performed by: STUDENT IN AN ORGANIZED HEALTH CARE EDUCATION/TRAINING PROGRAM

## 2023-11-03 PROCEDURE — 250N000012 HC RX MED GY IP 250 OP 636 PS 637: Performed by: STUDENT IN AN ORGANIZED HEALTH CARE EDUCATION/TRAINING PROGRAM

## 2023-11-03 PROCEDURE — 250N000013 HC RX MED GY IP 250 OP 250 PS 637: Performed by: INTERNAL MEDICINE

## 2023-11-03 PROCEDURE — G0378 HOSPITAL OBSERVATION PER HR: HCPCS

## 2023-11-03 PROCEDURE — 99239 HOSP IP/OBS DSCHRG MGMT >30: CPT | Performed by: INTERNAL MEDICINE

## 2023-11-03 RX ORDER — DEXAMETHASONE 6 MG/1
6 TABLET ORAL DAILY
Qty: 8 TABLET | Refills: 0 | Status: SHIPPED | OUTPATIENT
Start: 2023-11-03 | End: 2024-10-04

## 2023-11-03 RX ORDER — BENZOCAINE/MENTHOL 6 MG-10 MG
1 LOZENGE MUCOUS MEMBRANE
Qty: 20 LOZENGE | Refills: 0 | Status: SHIPPED | OUTPATIENT
Start: 2023-11-03 | End: 2024-10-04

## 2023-11-03 RX ADMIN — ASPIRIN 81 MG: 81 TABLET, COATED ORAL at 07:58

## 2023-11-03 RX ADMIN — METOPROLOL SUCCINATE 50 MG: 25 TABLET, EXTENDED RELEASE ORAL at 07:57

## 2023-11-03 RX ADMIN — SERTRALINE HYDROCHLORIDE 50 MG: 50 TABLET ORAL at 08:02

## 2023-11-03 RX ADMIN — DEXAMETHASONE 6 MG: 2 TABLET ORAL at 08:02

## 2023-11-03 RX ADMIN — AMLODIPINE BESYLATE 5 MG: 5 TABLET ORAL at 08:02

## 2023-11-03 ASSESSMENT — ACTIVITIES OF DAILY LIVING (ADL)
ADLS_ACUITY_SCORE: 20

## 2023-11-03 NOTE — PLAN OF CARE
End of Shift Summary  For vital signs and complete assessments, please see documentation flowsheets.     Pertinent assessments: Afebrile. Pt wore chronic 2L oxygen during the night, sating in mid 90s. LS clear. BS x4. Pt denies pain and nausea. Regular diet. SBA to ambulate. PIV - SL. A&O, pt refusing bed alarms; calls appropriately for assistance. Slept well.     Major Shift Events: none    Treatment Plan: Paxlovid, Decadron, Encourage activity, Hopeful discharge home today.

## 2023-11-03 NOTE — PLAN OF CARE
Goal Outcome Evaluation:      Plan of Care Reviewed With: patient         To Do:  End of Shift Summary  For vital signs and complete assessments, please see documentation flowsheets.     Pertinent assessments: pr oriented x 4, VSS, on room air, regular diet, LS clear. BS audible, Pt denies pain and nausea. SBA to ambulate. pt refusing bed alarms; calls appropriately for assistance.     Major Shift Events: Discharge home    Treatment Plan: Paxlovid, Decadron, Encourage activity

## 2023-11-03 NOTE — PLAN OF CARE
To Do:  End of Shift Summary  For vital signs and complete assessments, please see documentation flowsheets.     Pertinent assessments: Pt A&Ox4 on RA. SBA. Refused bed alarm, calls appropriately. Tolerating regular diet. Given PRN PO tylenol x1 for c/o headache & neck pain from coughing. Given PRN PO chloraseptic lozenge for sore throat. Given PRN albuterol inhaler x1 for mild wheezing & mild SOB. Pt on 2L NC at night at baseline.   Major Shift Events: none  Treatment Plan: Paxlovid, inhalers, Possible discharge tomorrow   Bedside Nurse: Frieda Duffy RN

## 2023-11-03 NOTE — DISCHARGE SUMMARY
Hospitalist Discharge Summary  [facility]    Pooja Trinidad MRN# [id number]   YOB: 1955 Age: 68 year old     Date of Admission:  11/1/2023  Date of Discharge:  11/3/2023 12:12 PM  Admitting Physician:  Violet Thornton MD  Discharge Physician:  Freeman Alonso DO  Discharging Service:  Hospitalist     Primary Provider: Velma Paez          Discharge Diagnosis:     Acute on Chronic Hypoxic Respiratory Failure  Covid 19 Infection  - CXR negative for infiltrates  - Dexamethasone 6 mg daily x10 days  - Paxlovid  - Pt wears 2LNC at night at baseline  - Wean O2 as able     COPD  - Not an acute exacerbation  - Resume PTA Trelegy at discharge     Chronic Medical Problems:  Paroxysmal Atrial Fibrillation s/p Ablation  Hx of Nicotine Dependence with Cigarettes             Discharge Disposition:     Discharged to home           Hospital Course:     Pooja Trinidad is a very pleasant 68 year old female with a history of COPD with home oxygen dependence of 2 L nocturnally, hyperlipidemia, paroxysmal atrial fibrillation status post ablation, history of nicotine dependence with cigarettes admitted on 11/1/2023 with shortness of breath and upper respiratory symptoms.  In the emergency department, the patient was found to be temperature 98.8  F, blood pressure 147/81, heart rate 117, respiratory rate 18, SPO2 90% on room air.  Initial lab work showed CRP 44.65, glucose 107, high-sensitivity troponin 15, WBC 10.3, D-dimer 0.79.  Chest x-ray showed no evidence of infiltrates.  The patient did test positive for COVID-19.  The patient ultimately did require 2 L nasal cannula and was started on dexamethasone and Paxlovid.  The patient's symptoms did improve and she was weaned to her baseline oxygen requirements.  On 11/3/2023, the patient was discharged home.     The patient was seen, examined, and counseled on this day. The hospitalization and plan of care was reviewed with the  patient extensively. All questions were addressed and the patient agreed to follow-up as noted above.           Allergies:     Allergies   Allergen Reactions    Azithromycin Other (See Comments)     Tolerated oral azithromycin in the past, but on 12/3/21 developed lip tingling and a warm feeling in her stomach shortly after starting IV azithromycin.  Symptoms resolved with IV Benadryl    Levofloxacin Diarrhea and GI Disturbance     Would like to avoid    Nickel Rash     Contact dermatitis   Contact dermatitis   Contact dermatitis   Contact dermatitis       Animal Dander Other (See Comments)    No Clinical Screening - See Comments Other (See Comments)     Environmental allergies    Other Environmental Allergy Other (See Comments)              Discharge Medications:     Discharge Medication List as of 11/3/2023 11:14 AM        START taking these medications    Details   benzocaine-menthol (SORE THROAT LOZENGES) 6-10 MG lozenge Place 1 lozenge inside cheek every 2 hours as needed for sore throat, Disp-20 lozenge, R-0, E-Prescribe      dexAMETHasone (DECADRON) 6 MG tablet Take 1 tablet (6 mg) by mouth daily for 8 days, Disp-8 tablet, R-0, E-Prescribe      nirmatrelvir and ritonavir (PAXLOVID) 300 mg/100 mg therapy pack Take 3 tablets by mouth 2 times daily for 4 days, Disp-24 tablet, R-0, E-PrescribeDate of symptom onset: 10/31; Risk criteria met: Yes; Weight >40 kg Yes; Renal fxn: normal;  Drug-Drug interactions reviewed & addressed: Yes           CONTINUE these medications which have NOT CHANGED    Details   albuterol (PROAIR HFA/PROVENTIL HFA/VENTOLIN HFA) 108 (90 Base) MCG/ACT inhaler Inhale 2 puffs into the lungs Take every 4-6 hours as needed, Historical      amLODIPine (NORVASC) 5 MG tablet Take 5 mg by mouth daily, Historical      aspirin (ASA) 81 MG EC tablet Take 81 mg by mouth daily, Historical      Aspirin-Caffeine 500-32.5 MG TABS Take 2 tablets by mouth 2 times daily as needed, Historical      calcium  "carbonate 600 mg-vitamin D 400 units (CALTRATE) 600-400 MG-UNIT per tablet Take 1 tablet by mouth daily (with breakfast), Historical      Fluticasone-Umeclidin-Vilanterol (TRELEGY ELLIPTA) 100-62.5-25 MCG/INH oral inhaler Inhale 1 puff into the lungs daily, Historical      ipratropium - albuterol 0.5 mg/2.5 mg/3 mL (DUONEB) 0.5-2.5 (3) MG/3ML neb solution Inhale 3 mLs into the lungs every 6 hours as needed, Historical      ipratropium-albuterol (COMBIVENT RESPIMAT)  MCG/ACT inhaler Inhale 1 puff into the lungs 4 times daily as needed, Historical      metoprolol succinate ER (TOPROL-XL) 50 MG 24 hr tablet Take 50 mg by mouth 2 times daily, Historical      sertraline (ZOLOFT) 50 MG tablet Take 50 mg by mouth daily, Historical           STOP taking these medications       rosuvastatin (CRESTOR) 5 MG tablet Comments:   Reason for Stopping:                      Condition on Discharge:     Discharge condition: Fair   Discharge vitals: Blood pressure 130/77, pulse 79, temperature 97.9  F (36.6  C), temperature source Oral, resp. rate 20, height 1.651 m (5' 5\"), weight 88.2 kg (194 lb 7.2 oz), SpO2 93%.   Code status on discharge: Full Code      BASIC PHYSICAL EXAMINATION:  GENERAL: No apparent distress.  CARDIOVASCULAR: Regular rate and rhythm without murmurs.  PULMONARY: Clear to auscultation bilaterally.   GASTROINTESTINAL: Abdomen soft, non-tender.  EXTREMITIES: No edema, pulses intact.  NEUROLOGIC: No focal deficits.            History of Illness:   See detailed admission note for full details.               Procedures excluding imaging which is summarized below:     Please see details in the electronic medical record.           Consultations:     None          Significant Results:     Results for orders placed or performed during the hospital encounter of 11/01/23   Chest XR,  PA & LAT    Narrative    EXAM: XR CHEST 2 VIEWS  LOCATION: [facility]  DATE: 11/1/2023    INDICATION: " dyspnea  COMPARISON: 08/31/2023      Impression    IMPRESSION: No change. Heart size and pulmonary vessels normal. Lungs appear mildly hyperinflated. Linear scarring at lung bases stable. Old right rib fracture.       Transthoracic Echocardiogram Results:  No results found for this or any previous visit (from the past 4320 hour(s)).             Pending Results:     Unresulted Labs Ordered in the Past 30 Days of this Admission       No orders found from 10/2/2023 to 11/2/2023.                        Discharge Instructions and Follow-Up:     Discharge instructions and follow-up:   Discharge Procedure Orders   MyChart Care Companion COVID Pathway   Referral Priority: Routine: Next available opening Referral Type: Consultation   Number of Visits Requested: 1     Reason for your hospital stay   Order Comments: Covid 19 Infection     Contact provider   Order Comments: Contact your primary care provider if If increased trouble breathing, new arm/leg swelling, dizziness/passing out, falls, bleeding that doesn't stop, or uncontrolled pain.     Follow-up and recommended labs and tests    Order Comments: Follow up with primary care provider, Velma Paez, within 7 days for hospital follow- up.  No follow up labs or test are needed.    Do not take your crestor while taking the paxlovid antiviral medication.  Once you have completed your full course of paxlovid, you may resume your crestor the following day.     Discharge - Quarantine/Isolation Instruction   Order Comments: Date of symptom onset: 10/31/2023   Date of first positive test: 11/01/2023  Stay home and away from others (self-isolate) for at least 20 days from when your symptoms started And...   You've had no fevers and no medicine that reduces fever for 1 full day (24 hours)  And...   Your other symptoms have resolved (gotten better).     Discharge - COVID Patient Oximeter Discharge Instructions   Order Comments: COVID Patient Oximeter Discharge Instructions      Use the oximeter to measure the amount of oxygen in your blood.  Put the clip on the tip of your pointer finger.  Turn on the device to measure your oxygen level.  Do this at least 2 times a day. Do it more than 2 times if you feel short of breath.      It should be 90% or higher while you're at rest.  If your oxygen level is 89% or lower, change the position of the clip on your finger or try another finger.  After 10 minutes if it's still 89% or lower, call 911/go to the Emergency Department.       If your shortness of breath may be getting worse but the oximeter still shows 90% or higher, call your doctor or clinic as soon as possible. If you can't reach your doctor or clinic, or if your shortness of breath gets very bad, call 911/go to the Emergency Department.    Note: Don't turn down your oxygen until you get instructions at your virtual visit.     Activity   Order Comments: Your activity upon discharge: activity as tolerated     Order Specific Question Answer Comments   Is discharge order? Yes      Oxygen Adult/Peds   Order Comments: Oxygen Documentation  I certify that this patient, Pooja Trinidad has been under my care (or a nurse practitioner or physican's assistant working with me). This is the face-to-face encounter for oxygen medical necessity.      At the time of this encounter supplemental oxygen is reasonable and necessary and is expected to improve the patient's condition in a home setting.       Patient has continued oxygen desaturation due to COPD J44.9.    If portability is ordered, is the patient mobile within the home? yes     Order Specific Question Answer Comments   DME Provider: [facility]    Start Date: 11/3/2023    Type: Resume    Did the patient have SpO2 (sat) testing (only needed for new oxgyen or liter flow changes)? Yes    Length of Need: Lifetime    Frequency of Use: Nocturnal    Mode of Delivery - Nocturnal Nasal Cannula    Liter Flow - Nocturnal (LPM): 2    Need for  Portable Oxygen Equipment: Yes    Evaluate for Conserving Device: Yes    Maintain Sats >= 90%    The face to face evaluation was performed on: 11/3/2023      Diet   Order Comments: Follow this diet upon discharge: Orders Placed This Encounter      Regular Diet Adult     Order Specific Question Answer Comments   Is discharge order? Yes           Total time spent in face to face contact with the patient and coordinating discharge was:  33 Minutes    Freeman Alonso DO  [facility] Hospitalist  [address] [place] [address]  11/03/2023

## 2023-11-03 NOTE — PROGRESS NOTES
[facility]    Hospitalist Progress Note  Name: Pooja Trinidad    MRN: [id number]  Provider:  Freeman Alonso DO  Date of Service: 11/03/2023    Summary of Stay: Pooja Trinidad is a very pleasant 68 year old female with a history of COPD with home oxygen dependence of 2 L nocturnally, hyperlipidemia, paroxysmal atrial fibrillation status post ablation, history of nicotine dependence with cigarettes admitted on 11/1/2023 with shortness of breath and upper respiratory symptoms.  In the emergency department, the patient was found to be temperature 98.8  F, blood pressure 147/81, heart rate 117, respiratory rate 18, SPO2 90% on room air.  Initial lab work showed CRP 44.65, glucose 107, high-sensitivity troponin 15, WBC 10.3, D-dimer 0.79.  Chest x-ray showed no evidence of infiltrates.  The patient did test positive for COVID-19.  The patient ultimately did require 2 L nasal cannula and was started on dexamethasone and Paxlovid.  The patient's symptoms did improve and she was weaned to her baseline oxygen requirements.  On 11/3/2023, the patient was discharged home.    TODAY'S PLAN:  Discharge home today.  Discussed meds at discharge including dexamethasone and paxlovid.  We discussed holding her stating while on paxlovid.  All questions answered.  Discharge home today.    Oxygen Documentation  I certify that this patient, Pooja Trinidad has been under my care (or a nurse practitioner or physican's assistant working with me). This is the face-to-face encounter for oxygen medical necessity.      At the time of this encounter supplemental oxygen is reasonable and necessary and is expected to improve the patient's condition in a home setting.       Patient has continued oxygen desaturation due to COPD J44.9.    If portability is ordered, is the patient mobile within the home? yes    Problem List:   Acute on Chronic Hypoxic Respiratory Failure  Covid 19 Infection  - CXR negative for  infiltrates  - Dexamethasone 6 mg daily x10 days  - Paxlovid  - Pt wears 2LNC at night at baseline  - Wean O2 as able     COPD  - Not an acute exacerbation  - Resume PTA Trelegy at discharge     Chronic Medical Problems:  Paroxysmal Atrial Fibrillation s/p Ablation  Hx of Nicotine Dependence with Cigarettes    I spent 42 minutes in reviewing this patient's labs, imaging, medications, medical history.  In addition time was spent interviewing the patient, communicating with family, and medical decision making.     DVT Prophylaxis: Pneumatic Compression Devices  Code Status: Full Code  Diet: Regular Diet Adult    Gomes Catheter: Not present  Disposition: Expected discharge today to home. Goals prior to discharge include oxygen requirements improved.   Family updated today: No     Interval History   Pt seen and examined.  Pt reports sore throat, but otherwise slept well.    -Data reviewed today: I personally reviewed all new labs and imaging results over the last 24 hours.     Physical Exam   Temp: 97.7  F (36.5  C) Temp src: Oral BP: 115/61 Pulse: 63   Resp: 18 SpO2: 96 % O2 Device: Nasal cannula Oxygen Delivery: 2 LPM  Vitals:    11/01/23 1643 11/02/23 0102   Weight: 89.8 kg (198 lb) 88.2 kg (194 lb 7.2 oz)     Vital Signs with Ranges  Temp:  [97.7  F (36.5  C)-98.7  F (37.1  C)] 97.7  F (36.5  C)  Pulse:  [63-84] 63  Resp:  [16-18] 18  BP: (110-136)/(55-75) 115/61  SpO2:  [92 %-97 %] 96 %  No intake/output data recorded.    GENERAL: No apparent distress. Awake, alert, and fully oriented.  HEENT: Normocephalic, atraumatic. Extraocular movements intact.  CARDIOVASCULAR: Regular rate and rhythm without murmurs or rubs. No S3.  PULMONARY: Clear bilaterally.  GASTROINTESTINAL: Soft, non-tender, non-distended. Bowel sounds normoactive.   EXTREMITIES: No cyanosis or clubbing. No edema.  NEUROLOGICAL: CN 2-12 grossly intact, no focal neurological deficits.  DERMATOLOGICAL: No rash, ulcer, bruising, nor  "jaundice.    Medications      amLODIPine  5 mg Oral Daily    aspirin  81 mg Oral Daily    dexAMETHasone  6 mg Oral Daily    metoprolol succinate ER  50 mg Oral BID    nirmatrelvir and ritonavir  3 tablet Oral BID    [Held by provider] rosuvastatin  5 mg Oral At Bedtime    sertraline  50 mg Oral Daily     Data     Laboratory:  Recent Labs   Lab 11/01/23  1853   WBC 10.3   HGB 14.6   HCT 44.4   MCV 96        Recent Labs   Lab 11/01/23  1853      POTASSIUM 3.9   CHLORIDE 102   CO2 23   ANIONGAP 11   *   BUN 17.8   CR 0.65   GFRESTIMATED >90   GENIA 9.0     No results for input(s): \"CULT\" in the last 168 hours.    Imaging:  No results found for this or any previous visit (from the past 24 hour(s)).      Freeman Alonso DO  [facility] Hospitalist  [address] [place] [place] [address]  Securely message with [employer] (more info)  Text page via [facility] Paging/Directory   11/03/2023   "

## 2023-11-06 ENCOUNTER — PATIENT OUTREACH (OUTPATIENT)
Dept: CARE COORDINATION | Facility: CLINIC | Age: 68
End: 2023-11-06
Payer: COMMERCIAL

## 2023-11-06 NOTE — PROGRESS NOTES
Clinic Care Coordination Contact  [facility]: Post-Discharge Note  SITUATION                                                      Admission:    Admission Date: 11/01/23   Reason for Admission: Covid 19 Infection  Discharge:   Discharge Date: 11/03/23  Discharge Diagnosis: Covid 19 Infection    BACKGROUND                                                      Per hospital discharge summary and inpatient provider notes:    Pooja Trinidad is a 68 year old female the past medical history significant for COPD, fully vaccinated for COVID presented to the ED with URI symptoms and shortness of breath and was found to have viral pneumonia due to COVID.      Patient said that she was fine until yesterday when she started developing congestion, runny nose, crease coughing.  She was unable to lay in bed as it was worsening her congestion and causing difficulty breathing.  Today she noticed that she is getting short of breath with minor ambulations so she decided to come to the ED for further evaluation.  Besides that she also reported myalgias.  No fever or chills.  No chest pain abdominal pain or diarrhea.  She used usually uses oxygen at night about 1 to 2 L nasal cannula due to her COPD.      On presentation to the ED she was afebrile tachycardic up to 108 normotensive 120/93 and saturating well on room air.  Labs unremarkable.  Chest x-ray without any focal pathology  In the ED her O2 sats were at 91% which dips down to 87% with trial of ambulation.  Asked to admit this lady given her age and borderline hypoxia.    ASSESSMENT           Discharge Assessment  How are you doing now that you are home?: I am doing pretty good. I still have a cough and I am tired.  How are your symptoms? (Red Flag symptoms escalate to triage hotline per guidelines): Improved  Do you feel your condition is stable enough to be safe at home until your provider visit?: Yes  Does the patient have their discharge instructions? : Yes  Does  the patient have questions regarding their discharge instructions? : No  Were you started on any new medications or were there changes to any of your previous medications? : Yes  Does the patient have all of their medications?: Yes  Do you have questions regarding any of your medications? : No  Do you have all of your needed medical supplies or equipment (DME)?  (i.e. oxygen tank, CPAP, cane, etc.): Yes  Discharge follow-up appointment scheduled within 14 calendar days? : No  Is patient agreeable to assistance with scheduling? : No                  PLAN                                                      Outpatient Plan: Follow up with primary care provider, Velma Paez, within 7 days for hospital follow- up. No follow up labs or  test are needed.    No future appointments.      For any urgent concerns, please contact our 24 hour nurse triage line: [phone] ([phone])         MELISSA Lewis  [phone]  Connected Care Resource [facility]

## 2023-11-21 LAB
ATRIAL RATE - MUSE: 96 BPM
DIASTOLIC BLOOD PRESSURE - MUSE: NORMAL MMHG
INTERPRETATION ECG - MUSE: NORMAL
P AXIS - MUSE: 42 DEGREES
PR INTERVAL - MUSE: 176 MS
QRS DURATION - MUSE: 88 MS
QT - MUSE: 344 MS
QTC - MUSE: 434 MS
R AXIS - MUSE: -5 DEGREES
SYSTOLIC BLOOD PRESSURE - MUSE: NORMAL MMHG
T AXIS - MUSE: 71 DEGREES
VENTRICULAR RATE- MUSE: 96 BPM

## 2024-09-28 ENCOUNTER — HEALTH MAINTENANCE LETTER (OUTPATIENT)
Age: 69
End: 2024-09-28

## 2024-10-04 RX ORDER — CETIRIZINE HYDROCHLORIDE 10 MG/1
1 TABLET ORAL DAILY
COMMUNITY
Start: 2024-08-15

## 2024-10-04 RX ORDER — ROSUVASTATIN CALCIUM 5 MG/1
1 TABLET, COATED ORAL AT BEDTIME
COMMUNITY
Start: 2024-09-21

## 2024-10-04 RX ORDER — VIT A/VIT C/VIT E/ZINC/COPPER 4296-226
1 CAPSULE ORAL 2 TIMES DAILY
COMMUNITY

## 2024-10-04 RX ORDER — LYSINE HCL 500 MG
1 TABLET ORAL
COMMUNITY

## 2024-10-04 RX ORDER — AZITHROMYCIN 250 MG/1
250 TABLET, FILM COATED ORAL EVERY 24 HOURS
COMMUNITY
Start: 2022-11-17

## 2024-10-14 ENCOUNTER — ANESTHESIA EVENT (OUTPATIENT)
Dept: SURGERY | Facility: CLINIC | Age: 69
End: 2024-10-14
Payer: COMMERCIAL

## 2024-10-14 ENCOUNTER — HOSPITAL ENCOUNTER (OUTPATIENT)
Facility: CLINIC | Age: 69
Discharge: HOME | End: 2024-10-14
Attending: INTERNAL MEDICINE | Admitting: INTERNAL MEDICINE
Payer: COMMERCIAL

## 2024-10-14 ENCOUNTER — ANESTHESIA (OUTPATIENT)
Dept: SURGERY | Facility: CLINIC | Age: 69
End: 2024-10-14
Payer: COMMERCIAL

## 2024-10-14 VITALS
HEIGHT: 65 IN | OXYGEN SATURATION: 95 % | RESPIRATION RATE: 20 BRPM | HEART RATE: 90 BPM | TEMPERATURE: 97.2 F | BODY MASS INDEX: 27.38 KG/M2 | WEIGHT: 164.3 LBS | DIASTOLIC BLOOD PRESSURE: 91 MMHG | SYSTOLIC BLOOD PRESSURE: 134 MMHG

## 2024-10-14 LAB — COLONOSCOPY: NORMAL

## 2024-10-14 PROCEDURE — 258N000003 HC RX IP 258 OP 636: Performed by: NURSE ANESTHETIST, CERTIFIED REGISTERED

## 2024-10-14 PROCEDURE — 250N000011 HC RX IP 250 OP 636: Performed by: NURSE ANESTHETIST, CERTIFIED REGISTERED

## 2024-10-14 PROCEDURE — 88305 TISSUE EXAM BY PATHOLOGIST: CPT | Mod: TC | Performed by: INTERNAL MEDICINE

## 2024-10-14 PROCEDURE — 250N000009 HC RX 250: Performed by: NURSE ANESTHETIST, CERTIFIED REGISTERED

## 2024-10-14 PROCEDURE — 272N000001 HC OR GENERAL SUPPLY STERILE: Performed by: INTERNAL MEDICINE

## 2024-10-14 PROCEDURE — 360N000075 HC SURGERY LEVEL 2, PER MIN: Performed by: INTERNAL MEDICINE

## 2024-10-14 PROCEDURE — 999N000141 HC STATISTIC PRE-PROCEDURE NURSING ASSESSMENT: Performed by: INTERNAL MEDICINE

## 2024-10-14 PROCEDURE — 370N000017 HC ANESTHESIA TECHNICAL FEE, PER MIN: Performed by: INTERNAL MEDICINE

## 2024-10-14 PROCEDURE — 88305 TISSUE EXAM BY PATHOLOGIST: CPT | Mod: 26 | Performed by: PATHOLOGY

## 2024-10-14 PROCEDURE — 710N000012 HC RECOVERY PHASE 2, PER MINUTE: Performed by: INTERNAL MEDICINE

## 2024-10-14 RX ORDER — ONDANSETRON 2 MG/ML
4 INJECTION INTRAMUSCULAR; INTRAVENOUS EVERY 30 MIN PRN
Status: DISCONTINUED | OUTPATIENT
Start: 2024-10-14 | End: 2024-10-14 | Stop reason: HOSPADM

## 2024-10-14 RX ORDER — ONDANSETRON 4 MG/1
4 TABLET, ORALLY DISINTEGRATING ORAL EVERY 30 MIN PRN
Status: DISCONTINUED | OUTPATIENT
Start: 2024-10-14 | End: 2024-10-14 | Stop reason: HOSPADM

## 2024-10-14 RX ORDER — SODIUM CHLORIDE, SODIUM LACTATE, POTASSIUM CHLORIDE, CALCIUM CHLORIDE 600; 310; 30; 20 MG/100ML; MG/100ML; MG/100ML; MG/100ML
INJECTION, SOLUTION INTRAVENOUS CONTINUOUS PRN
Status: DISCONTINUED | OUTPATIENT
Start: 2024-10-14 | End: 2024-10-14

## 2024-10-14 RX ORDER — SODIUM CHLORIDE, SODIUM LACTATE, POTASSIUM CHLORIDE, CALCIUM CHLORIDE 600; 310; 30; 20 MG/100ML; MG/100ML; MG/100ML; MG/100ML
INJECTION, SOLUTION INTRAVENOUS CONTINUOUS
Status: DISCONTINUED | OUTPATIENT
Start: 2024-10-14 | End: 2024-10-14 | Stop reason: HOSPADM

## 2024-10-14 RX ORDER — DEXAMETHASONE SODIUM PHOSPHATE 4 MG/ML
4 INJECTION, SOLUTION INTRA-ARTICULAR; INTRALESIONAL; INTRAMUSCULAR; INTRAVENOUS; SOFT TISSUE
Status: DISCONTINUED | OUTPATIENT
Start: 2024-10-14 | End: 2024-10-14 | Stop reason: HOSPADM

## 2024-10-14 RX ORDER — NALOXONE HYDROCHLORIDE 0.4 MG/ML
0.1 INJECTION, SOLUTION INTRAMUSCULAR; INTRAVENOUS; SUBCUTANEOUS
Status: DISCONTINUED | OUTPATIENT
Start: 2024-10-14 | End: 2024-10-14 | Stop reason: HOSPADM

## 2024-10-14 RX ORDER — PROPOFOL 10 MG/ML
INJECTION, EMULSION INTRAVENOUS CONTINUOUS PRN
Status: DISCONTINUED | OUTPATIENT
Start: 2024-10-14 | End: 2024-10-14

## 2024-10-14 RX ORDER — HYDROMORPHONE HCL IN WATER/PF 6 MG/30 ML
0.2 PATIENT CONTROLLED ANALGESIA SYRINGE INTRAVENOUS EVERY 5 MIN PRN
Status: DISCONTINUED | OUTPATIENT
Start: 2024-10-14 | End: 2024-10-14 | Stop reason: HOSPADM

## 2024-10-14 RX ORDER — OXYCODONE HYDROCHLORIDE 5 MG/1
10 TABLET ORAL
Status: DISCONTINUED | OUTPATIENT
Start: 2024-10-14 | End: 2024-10-14 | Stop reason: HOSPADM

## 2024-10-14 RX ORDER — ONDANSETRON 2 MG/ML
INJECTION INTRAMUSCULAR; INTRAVENOUS PRN
Status: DISCONTINUED | OUTPATIENT
Start: 2024-10-14 | End: 2024-10-14

## 2024-10-14 RX ORDER — PROPOFOL 10 MG/ML
INJECTION, EMULSION INTRAVENOUS PRN
Status: DISCONTINUED | OUTPATIENT
Start: 2024-10-14 | End: 2024-10-14

## 2024-10-14 RX ORDER — HYDROMORPHONE HCL IN WATER/PF 6 MG/30 ML
0.4 PATIENT CONTROLLED ANALGESIA SYRINGE INTRAVENOUS EVERY 5 MIN PRN
Status: DISCONTINUED | OUTPATIENT
Start: 2024-10-14 | End: 2024-10-14 | Stop reason: HOSPADM

## 2024-10-14 RX ORDER — FENTANYL CITRATE 50 UG/ML
25 INJECTION, SOLUTION INTRAMUSCULAR; INTRAVENOUS EVERY 5 MIN PRN
Status: DISCONTINUED | OUTPATIENT
Start: 2024-10-14 | End: 2024-10-14 | Stop reason: HOSPADM

## 2024-10-14 RX ORDER — FENTANYL CITRATE 50 UG/ML
50 INJECTION, SOLUTION INTRAMUSCULAR; INTRAVENOUS EVERY 5 MIN PRN
Status: DISCONTINUED | OUTPATIENT
Start: 2024-10-14 | End: 2024-10-14 | Stop reason: HOSPADM

## 2024-10-14 RX ORDER — LIDOCAINE HYDROCHLORIDE 20 MG/ML
INJECTION, SOLUTION INFILTRATION; PERINEURAL PRN
Status: DISCONTINUED | OUTPATIENT
Start: 2024-10-14 | End: 2024-10-14

## 2024-10-14 RX ORDER — LIDOCAINE 40 MG/G
CREAM TOPICAL
Status: DISCONTINUED | OUTPATIENT
Start: 2024-10-14 | End: 2024-10-14 | Stop reason: HOSPADM

## 2024-10-14 RX ORDER — FLUMAZENIL 0.1 MG/ML
0.2 INJECTION, SOLUTION INTRAVENOUS
Status: DISCONTINUED | OUTPATIENT
Start: 2024-10-14 | End: 2024-10-14 | Stop reason: HOSPADM

## 2024-10-14 RX ORDER — OXYCODONE HYDROCHLORIDE 5 MG/1
5 TABLET ORAL
Status: DISCONTINUED | OUTPATIENT
Start: 2024-10-14 | End: 2024-10-14 | Stop reason: HOSPADM

## 2024-10-14 RX ADMIN — LIDOCAINE HYDROCHLORIDE 50 MG: 20 INJECTION, SOLUTION INFILTRATION; PERINEURAL at 09:18

## 2024-10-14 RX ADMIN — PHENYLEPHRINE HYDROCHLORIDE 50 MCG: 10 INJECTION INTRAVENOUS at 09:26

## 2024-10-14 RX ADMIN — PROPOFOL 30 MG: 10 INJECTION, EMULSION INTRAVENOUS at 09:22

## 2024-10-14 RX ADMIN — PROPOFOL 100 MCG/KG/MIN: 10 INJECTION, EMULSION INTRAVENOUS at 09:18

## 2024-10-14 RX ADMIN — SODIUM CHLORIDE, POTASSIUM CHLORIDE, SODIUM LACTATE AND CALCIUM CHLORIDE: 600; 310; 30; 20 INJECTION, SOLUTION INTRAVENOUS at 09:02

## 2024-10-14 RX ADMIN — PROPOFOL 20 MG: 10 INJECTION, EMULSION INTRAVENOUS at 09:18

## 2024-10-14 RX ADMIN — ONDANSETRON 4 MG: 2 INJECTION INTRAMUSCULAR; INTRAVENOUS at 09:38

## 2024-10-14 ASSESSMENT — ACTIVITIES OF DAILY LIVING (ADL)
ADLS_ACUITY_SCORE: 36
ADLS_ACUITY_SCORE: 37
ADLS_ACUITY_SCORE: 37

## 2024-10-14 ASSESSMENT — COPD QUESTIONNAIRES
COPD: 1
CAT_SEVERITY: MODERATE

## 2024-10-14 ASSESSMENT — ENCOUNTER SYMPTOMS: DYSRHYTHMIAS: 1

## 2024-10-14 NOTE — DISCHARGE INSTRUCTIONS
[facility]                  CLINIC PHONE NUMBER:  [phone]     COLONOSCOPY DISCHARGE INSTRUCTIONS    You may not drive, use heavy equipment or consume alcohol for 24 hours because the drugs you were given may cause dizziness, drowsiness, forgetfulness and slower reaction time.    You may resume your regular diet and medications.    If you had a biopsy or polypectomy done, do not take aspirin, aleve (naproxen) or ibuprofen products for the next 10 days.  Tylenol (acetaminophen) is safe to take.    What to watch for:    Problems rarely occur after the exam.  It is important for you to be aware of the early signs of a possible complication.  Call your doctor immediately if you notice any of the followin.  Unusual or persistent abdominal pain (pain that does not move around like a gas pain).    2.  Passing bright red blood from your rectum.    3.  Black or bloody stools.    4.  Temperature above 100.6 degrees F (37.5 degrees C)    If you feel this has become a medical emergency please call 911.

## 2024-10-14 NOTE — ANESTHESIA CARE TRANSFER NOTE
Patient: Pooja Trinidad    Procedure: Procedure(s):  Colonoscopy with biopsies       Diagnosis: Screening for colon cancer [Z12.11]  Diagnosis Additional Information: No value filed.    Anesthesia Type:   MAC     Note:    Oropharynx: oropharynx clear of all foreign objects                          Vitals:  Vitals Value Taken Time   BP     Temp     Pulse     Resp     SpO2         Electronically Signed By: JONES Almendarez CRNA  October 14, 2024  9:50 AM

## 2024-10-14 NOTE — ANESTHESIA CARE TRANSFER NOTE
Patient: Pooja Trinidad    Procedure: Procedure(s):  Colonoscopy with biopsies       Diagnosis: Screening for colon cancer [Z12.11]  Diagnosis Additional Information: No value filed.    Anesthesia Type:   MAC     Note:      Level of Consciousness: awake  Oxygen Supplementation: room air    Independent Airway: airway patency satisfactory and stable  Dentition: dentition unchanged  Vital Signs Stable: post-procedure vital signs reviewed and stable  Report to RN Given: handoff report given  Patient transferred to: Phase II    Handoff Report: Identifed the Patient, Identified the Reponsible Provider, Reviewed the pertinent medical history, Discussed the surgical course, Reviewed Intra-OP anesthesia mangement and issues during anesthesia, Set expectations for post-procedure period and Allowed opportunity for questions and acknowledgement of understanding      Vitals:  Vitals Value Taken Time   BP     Temp     Pulse     Resp     SpO2         Electronically Signed By: JONES Almendarez CRNA  October 14, 2024  9:50 AM

## 2024-10-14 NOTE — ANESTHESIA POSTPROCEDURE EVALUATION
Patient: Pooja Trinidad    Procedure: Procedure(s):  Colonoscopy with biopsies       Anesthesia Type:  MAC    Note:  Disposition: Outpatient   Postop Pain Control: Uneventful            Sign Out: Well controlled pain   PONV: No   Neuro/Psych: Uneventful            Sign Out: Acceptable/Baseline neuro status   Airway/Respiratory: Uneventful            Sign Out: Acceptable/Baseline resp. status   CV/Hemodynamics: Uneventful            Sign Out: Acceptable CV status; No obvious hypovolemia; No obvious fluid overload   Other NRE: NONE   DID A NON-ROUTINE EVENT OCCUR? No           Last vitals:  Vitals Value Taken Time   /91 10/14/24 1037   Temp 97.2  F (36.2  C) 10/14/24 1037   Pulse 90 10/14/24 1037   Resp 20 10/14/24 1037   SpO2 95 % 10/14/24 1037       Electronically Signed By: Pranay Castillo MD  October 14, 2024  1:04 PM   any other upcoming appointments with additional questions and/or concerns. I have spent 25 minutes face-to-face with the patient more than 50% of this time was spent counseling and coordinating care as outlined above.

## 2024-10-14 NOTE — ANESTHESIA PREPROCEDURE EVALUATION
Anesthesia Pre-Procedure Evaluation    Patient: Pooja Trinidad   MRN: [id number] : 1955        Procedure : Procedure(s):  Colonoscopy          Past Medical History:   Diagnosis Date    Arrhythmia     COPD (chronic obstructive pulmonary disease) (H)     Coronary artery disease     Oxygen dependent       Past Surgical History:   Procedure Laterality Date    IR CAROTID ANGIOGRAM  3/5/2012    IR CAROTID ANGIOGRAM  3/5/2012    IR CAROTID ANGIOGRAM  3/6/2012    IR MISCELLANEOUS PROCEDURE  3/5/2012    IR MISCELLANEOUS PROCEDURE  3/5/2012    IR MISCELLANEOUS PROCEDURE  3/6/2012    IR MISCELLANEOUS PROCEDURE  3/6/2012      Allergies   Allergen Reactions    Azithromycin Other (See Comments)     Tolerated oral azithromycin in the past, but on 12/3/21 developed lip tingling and a warm feeling in her stomach shortly after starting IV azithromycin.  Symptoms resolved with IV Benadryl    Levofloxacin Diarrhea and GI Disturbance     Would like to avoid    Nickel Rash     Contact dermatitis    Animal Dander Other (See Comments)    Other Environmental Allergy Other (See Comments)      Social History     Tobacco Use    Smoking status: Former     Types: Cigarettes    Smokeless tobacco: Never   Substance Use Topics    Alcohol use: Yes     Comment: occ      Wt Readings from Last 1 Encounters:   23 88.2 kg (194 lb 7.2 oz)        Anesthesia Evaluation   Pt has had prior anesthetic. Type: General.    No history of anesthetic complications       ROS/MED HX  ENT/Pulmonary:     (+)                         moderate,  COPD, O2 dependent, during Nighttime,            Neurologic:  - neg neurologic ROS     Cardiovascular:     (+)  - -  CAD -  - -                        dysrhythmias, a-fib,             METS/Exercise Tolerance:     Hematologic:  - neg hematologic  ROS     Musculoskeletal:   (+)  arthritis,             GI/Hepatic:  - neg GI/hepatic ROS     Renal/Genitourinary:  - neg Renal ROS     Endo:  - neg endo ROS    "  Psychiatric/Substance Use:       Infectious Disease:  - neg infectious disease ROS     Malignancy:       Other:            Physical Exam    Airway        Mallampati: II   TM distance: > 3 FB   Neck ROM: full   Mouth opening: > 3 cm    Respiratory Devices and Support         Dental       (+) Removable bridges or other hardware      Cardiovascular   cardiovascular exam normal          Pulmonary   pulmonary exam normal                OUTSIDE LABS:  CBC:   Lab Results   Component Value Date    WBC 10.3 11/01/2023    WBC 5.8 01/02/2022    HGB 14.6 11/01/2023    HGB 13.3 01/02/2022    HCT 44.4 11/01/2023    HCT 43.0 01/02/2022     11/01/2023     01/02/2022     BMP:   Lab Results   Component Value Date     11/01/2023     01/02/2022    POTASSIUM 3.9 11/01/2023    POTASSIUM 4.2 01/02/2022    CHLORIDE 102 11/01/2023    CHLORIDE 108 01/02/2022    CO2 23 11/01/2023    CO2 30 01/02/2022    BUN 17.8 11/01/2023    BUN 15 01/02/2022    CR 0.65 11/01/2023    CR 0.67 01/02/2022     (H) 11/01/2023     (H) 01/02/2022     COAGS:   Lab Results   Component Value Date    INR 0.95 11/02/2023     POC: No results found for: \"BGM\", \"HCG\", \"HCGS\"  HEPATIC:   Lab Results   Component Value Date    ALBUMIN 3.3 (L) 01/02/2022    PROTTOTAL 6.2 (L) 01/02/2022    ALT 28 01/02/2022    AST 16 01/02/2022    ALKPHOS 109 01/02/2022    BILITOTAL 0.2 01/02/2022     OTHER:   Lab Results   Component Value Date    PH 7.35 11/29/2021    LACT 1.2 11/01/2023    GENIA 9.0 11/01/2023    MAG 2.4 (H) 11/27/2021       Anesthesia Plan    ASA Status:  3       Anesthesia Type: MAC.     - Reason for MAC: straight local not clinically adequate              Consents    Anesthesia Plan(s) and associated risks, benefits, and realistic alternatives discussed. Questions answered and patient/representative(s) expressed understanding.     - Discussed:     - Discussed with:  Patient      - Extended Intubation/Ventilatory Support Discussed: " "No.      - Patient is DNR/DNI Status: No     Use of blood products discussed: No .     Postoperative Care    Pain management: IV analgesics, Oral pain medications, Multi-modal analgesia.   PONV prophylaxis: Ondansetron (or other 5HT-3), Dexamethasone or Solumedrol     Comments:               Pranay Castillo MD    I have reviewed the pertinent notes and labs in the chart from the past 30 days and (re)examined the patient.  Any updates or changes from those notes are reflected in this note.                       # Overweight: Estimated body mass index is 28.62 kg/m  as calculated from the following:    Height as of this encounter: 1.651 m (5' 5\").    Weight as of this encounter: 78 kg (172 lb).             "

## 2024-10-15 LAB
PATH REPORT.COMMENTS IMP SPEC: NORMAL
PATH REPORT.COMMENTS IMP SPEC: NORMAL
PATH REPORT.FINAL DX SPEC: NORMAL
PATH REPORT.GROSS SPEC: NORMAL
PATH REPORT.MICROSCOPIC SPEC OTHER STN: NORMAL
PATH REPORT.RELEVANT HX SPEC: NORMAL
PHOTO IMAGE: NORMAL

## 2025-08-30 ENCOUNTER — HEALTH MAINTENANCE LETTER (OUTPATIENT)
Age: 70
End: 2025-08-30

## (undated) DEVICE — PAD CHUX UNDERPAD 23X24" 7136

## (undated) DEVICE — BAG CLEAR TRASH 1.3M 39X33" P4040C

## (undated) DEVICE — ENDO FORCEP SPIKED SERRATED SHAFT JUMBO 239CM G56998

## (undated) DEVICE — KIT PROCEDURE W/CLEAN-A-SCOPE LINERS V2 200800

## (undated) DEVICE — SUCTION MANIFOLD NEPTUNE 2 SYS 4 PORT 0702-020-000

## (undated) DEVICE — TUBING SUCTION MEDI-VAC SOFT 3/16"X20' N520A

## (undated) RX ORDER — PROPOFOL 10 MG/ML
INJECTION, EMULSION INTRAVENOUS
Status: DISPENSED
Start: 2024-10-14

## (undated) RX ORDER — ONDANSETRON 2 MG/ML
INJECTION INTRAMUSCULAR; INTRAVENOUS
Status: DISPENSED
Start: 2024-10-14

## (undated) RX ORDER — FENTANYL CITRATE-0.9 % NACL/PF 10 MCG/ML
PLASTIC BAG, INJECTION (ML) INTRAVENOUS
Status: DISPENSED
Start: 2024-10-14